# Patient Record
Sex: MALE | Race: BLACK OR AFRICAN AMERICAN | NOT HISPANIC OR LATINO | Employment: PART TIME | URBAN - METROPOLITAN AREA
[De-identification: names, ages, dates, MRNs, and addresses within clinical notes are randomized per-mention and may not be internally consistent; named-entity substitution may affect disease eponyms.]

---

## 2017-06-23 PROBLEM — Z00.00 ENCOUNTER FOR PREVENTIVE HEALTH EXAMINATION: Status: ACTIVE | Noted: 2017-06-23

## 2019-04-23 ENCOUNTER — OFFICE VISIT (OUTPATIENT)
Dept: FAMILY MEDICINE | Facility: CLINIC | Age: 59
End: 2019-04-23
Payer: COMMERCIAL

## 2019-04-23 VITALS
SYSTOLIC BLOOD PRESSURE: 120 MMHG | WEIGHT: 161 LBS | TEMPERATURE: 97 F | DIASTOLIC BLOOD PRESSURE: 70 MMHG | HEIGHT: 66 IN | BODY MASS INDEX: 25.88 KG/M2 | HEART RATE: 78 BPM | OXYGEN SATURATION: 100 % | RESPIRATION RATE: 16 BRPM

## 2019-04-23 DIAGNOSIS — K21.00 GASTROESOPHAGEAL REFLUX DISEASE WITH ESOPHAGITIS: Primary | ICD-10-CM

## 2019-04-23 DIAGNOSIS — K40.90 LEFT INGUINAL HERNIA: ICD-10-CM

## 2019-04-23 DIAGNOSIS — R10.32 LLQ ABDOMINAL PAIN: ICD-10-CM

## 2019-04-23 DIAGNOSIS — R10.12 LUQ ABDOMINAL PAIN: ICD-10-CM

## 2019-04-23 PROCEDURE — 99204 OFFICE O/P NEW MOD 45 MIN: CPT | Performed by: PHYSICIAN ASSISTANT

## 2019-04-23 RX ORDER — LISINOPRIL AND HYDROCHLOROTHIAZIDE 12.5; 2 MG/1; MG/1
1 TABLET ORAL DAILY
COMMUNITY
Start: 2017-10-27 | End: 2019-04-23

## 2019-04-23 RX ORDER — METOPROLOL TARTRATE 50 MG
50 TABLET ORAL 2 TIMES DAILY
COMMUNITY
Start: 2017-10-27 | End: 2019-04-23

## 2019-04-23 RX ORDER — AMLODIPINE BESYLATE 5 MG/1
5 TABLET ORAL DAILY
COMMUNITY
Start: 2017-10-27 | End: 2019-04-23

## 2019-04-23 ASSESSMENT — ENCOUNTER SYMPTOMS
CONSTITUTIONAL NEGATIVE: 1
MUSCULOSKELETAL NEGATIVE: 1
PSYCHIATRIC NEGATIVE: 1
ENDOCRINE NEGATIVE: 1
EYES NEGATIVE: 1
RESPIRATORY NEGATIVE: 1
CARDIOVASCULAR NEGATIVE: 1
NEUROLOGICAL NEGATIVE: 1

## 2019-04-23 ASSESSMENT — MIFFLIN-ST. JEOR: SCORE: 1483.04

## 2019-04-23 NOTE — PROGRESS NOTES
SUBJECTIVE:   Geronimo Coats is a 58 year old male who presents to clinic today for the following   health issues:      Abdominal Pain      Duration: 3-4 months    Description (location/character/radiation): LUQ ans LLQ       Associated flank pain: None    Intensity:  moderate    Accompanying signs and symptoms:        Fever/Chills: no        Gas/Bloating: no        Nausea/vomitting: no        Diarrhea: no        Dysuria or Hematuria: no     History (previous similar pain/trauma/previous testing):     Precipitating or alleviating factors:       Pain worse with eating/BM/urination: YES- the moment he eats the pain starts- sharp for about 10-20 minutes       Pain relieved by BM: no     Therapies tried and outcome: None    LMP:  not applicable    He has severe side pain - right side very severe after eating  He has a new bulge on the left lower abdomen - worse with coughing, better when lying flat  PAIN is not at the sight of the hernia, but over the LLQ  No previous appointments for this  Has not had insurance for about a year  At night, he gets a chest pain when lying flat, which improves with water  Raising his head helps  No treatments for the burning pain  Sometimes stool is dark black - no blood, no constipation  Urination is normal     History of right inguinal hernia repair in 1974      Additional history: as documented    Reviewed  and updated as needed this visit by clinical staff  Tobacco  Allergies  Med Hx  Surg Hx  Fam Hx  Soc Hx        Reviewed and updated as needed this visit by Provider         There is no problem list on file for this patient.    History reviewed. No pertinent surgical history.    Social History     Tobacco Use     Smoking status: Never Smoker     Smokeless tobacco: Never Used   Substance Use Topics     Alcohol use: Yes     Comment: OCCATIONALLY     History reviewed. No pertinent family history.      Current Outpatient Medications   Medication Sig Dispense Refill     omeprazole  "(PRILOSEC) 20 MG DR capsule Take 2 capsules (40 mg) by mouth daily 60 capsule 1     ranitidine (ZANTAC) 150 MG tablet Take 1 tablet (150 mg) by mouth 2 times daily 60 tablet 1     Allergies   Allergen Reactions     Chocolate      Nuts      Orange Fruit [Citrus] Cough       Review of Systems   Constitutional: Negative.    HENT: Negative.    Eyes: Negative.    Respiratory: Negative.    Cardiovascular: Negative.    Endocrine: Negative.    Genitourinary: Negative.    Musculoskeletal: Negative.    Skin: Negative.    Neurological: Negative.    Psychiatric/Behavioral: Negative.        OBJECTIVE:     /70 (Cuff Size: Adult Regular)   Pulse 78   Temp 97  F (36.1  C) (Tympanic)   Resp 16   Ht 1.676 m (5' 6\")   Wt 73 kg (161 lb)   SpO2 100%   BMI 25.99 kg/m    Body mass index is 25.99 kg/m .    Physical Exam   Constitutional: He is oriented to person, place, and time. He appears well-developed and well-nourished. No distress.   HENT:   Head: Normocephalic.   Right Ear: External ear normal.   Left Ear: External ear normal.   Nose: Nose normal.   Mouth/Throat: Oropharynx is clear and moist.   Eyes: Conjunctivae and EOM are normal.   Neck: Normal range of motion.   Cardiovascular: Normal rate, regular rhythm and normal heart sounds.   Pulmonary/Chest: Effort normal and breath sounds normal.   Abdominal: Soft. There is no hepatosplenomegaly. There is tenderness in the left lower quadrant. There is no rigidity, no rebound, no guarding, no CVA tenderness, no tenderness at McBurney's point and negative Simental's sign. A hernia is present. Hernia confirmed positive in the left inguinal area.   Neurological: He is alert and oriented to person, place, and time.   Skin: He is not diaphoretic.   Psychiatric: He has a normal mood and affect. Judgment normal.       No results found for this or any previous visit (from the past 24 hour(s)).    ASSESSMENT/PLAN:       ICD-10-CM    1. Gastroesophageal reflux disease with esophagitis " K21.0 omeprazole (PRILOSEC) 20 MG DR capsule     ranitidine (ZANTAC) 150 MG tablet   2. LLQ abdominal pain R10.32 Fecal colorectal cancer screen (FIT)     CBC with platelets and differential     Comprehensive metabolic panel (BMP + Alb, Alk Phos, ALT, AST, Total. Bili, TP)     H Pylori antigen, stool   3. LUQ abdominal pain R10.12 Lipase   4. Left inguinal hernia K40.90 GENERAL SURG ADULT REFERRAL      1. No evidence of acute abdomen today.  History is concerning for gastritis or PUD.  Labs ordered today to evaluate for anemia, infection, liver function, kidney function, electrolytes, pancreas enzymes.  Stool studies to look for H pylori and blood.  He will start with a trial of omeprazole and ranitidine for a month and then recheck.  If the labs come up with anything - we will adjust the plan accordingly.   2. General surgery referral for left inguinal hernia.     Return in about 1 month (around 5/21/2019) for abdominal pain recheck.    Patient Instructions   I would like you to start by taking the two prescribed medications every day for a month, and then come back for a recheck.     We will also send you to a general surgeon for the hernia.     We will get labs today, and send you home with a kit for stool testing.      If the pain becomes very severe and does not improve after 10-30 minutes - go to the ER.       Eliezer Garcia PA-C  [facility]

## 2019-04-23 NOTE — PATIENT INSTRUCTIONS
I would like you to start by taking the two prescribed medications every day for a month, and then come back for a recheck.     We will also send you to a general surgeon for the hernia.     We will get labs today, and send you home with a kit for stool testing.      If the pain becomes very severe and does not improve after 10-30 minutes - go to the ER.

## 2019-04-24 DIAGNOSIS — R10.12 LUQ ABDOMINAL PAIN: ICD-10-CM

## 2019-04-24 DIAGNOSIS — R10.32 LLQ ABDOMINAL PAIN: ICD-10-CM

## 2019-04-24 DIAGNOSIS — D69.6 THROMBOCYTOPENIA (H): Primary | ICD-10-CM

## 2019-04-24 LAB
BASOPHILS # BLD AUTO: 0 10E9/L (ref 0–0.2)
BASOPHILS NFR BLD AUTO: 0.3 %
DIFFERENTIAL METHOD BLD: ABNORMAL
EOSINOPHIL # BLD AUTO: 0.1 10E9/L (ref 0–0.7)
EOSINOPHIL NFR BLD AUTO: 3.6 %
ERYTHROCYTE [DISTWIDTH] IN BLOOD BY AUTOMATED COUNT: 13.6 % (ref 10–15)
HCT VFR BLD AUTO: 46.1 % (ref 40–53)
HGB BLD-MCNC: 16.2 G/DL (ref 13.3–17.7)
LIPASE SERPL-CCNC: 155 U/L (ref 73–393)
LYMPHOCYTES # BLD AUTO: 1.4 10E9/L (ref 0.8–5.3)
LYMPHOCYTES NFR BLD AUTO: 41.4 %
MCH RBC QN AUTO: 29.5 PG (ref 26.5–33)
MCHC RBC AUTO-ENTMCNC: 35.1 G/DL (ref 31.5–36.5)
MCV RBC AUTO: 84 FL (ref 78–100)
MONOCYTES # BLD AUTO: 0.3 10E9/L (ref 0–1.3)
MONOCYTES NFR BLD AUTO: 8.8 %
NEUTROPHILS # BLD AUTO: 1.5 10E9/L (ref 1.6–8.3)
NEUTROPHILS NFR BLD AUTO: 45.9 %
PLATELET # BLD AUTO: 98 10E9/L (ref 150–450)
RBC # BLD AUTO: 5.49 10E12/L (ref 4.4–5.9)
WBC # BLD AUTO: 3.3 10E9/L (ref 4–11)

## 2019-04-24 PROCEDURE — 82274 ASSAY TEST FOR BLOOD FECAL: CPT | Performed by: PHYSICIAN ASSISTANT

## 2019-04-24 PROCEDURE — 83690 ASSAY OF LIPASE: CPT | Performed by: PHYSICIAN ASSISTANT

## 2019-04-24 PROCEDURE — 36415 COLL VENOUS BLD VENIPUNCTURE: CPT | Performed by: PHYSICIAN ASSISTANT

## 2019-04-24 PROCEDURE — 85025 COMPLETE CBC W/AUTO DIFF WBC: CPT | Performed by: PHYSICIAN ASSISTANT

## 2019-04-24 PROCEDURE — 80053 COMPREHEN METABOLIC PANEL: CPT | Performed by: PHYSICIAN ASSISTANT

## 2019-04-24 NOTE — LETTER
April 26, 2019      Geronimo Coats  [address] [place] [id number]        Dear ,    We are writing to inform you of your test results.    Test results indicate you may require additional follow up.  We will have you come in for additional blood work to evaluate these lab abnormalities further - then I will be in contact with you on those results.     Resulted Orders   Lipase   Result Value Ref Range    Lipase 155 73 - 393 U/L   Comprehensive metabolic panel (BMP + Alb, Alk Phos, ALT, AST, Total. Bili, TP)   Result Value Ref Range    Sodium 142 133 - 144 mmol/L    Potassium 3.0 (L) 3.4 - 5.3 mmol/L    Chloride 106 94 - 109 mmol/L    Carbon Dioxide 30 20 - 32 mmol/L    Anion Gap 6 3 - 14 mmol/L    Glucose 104 (H) 70 - 99 mg/dL    Urea Nitrogen 13 7 - 30 mg/dL    Creatinine 1.22 0.66 - 1.25 mg/dL    GFR Estimate 64 >60 mL/min/[1.73_m2]      Comment:      Non  GFR Calc  Starting 12/18/2018, serum creatinine based estimated GFR (eGFR) will be   calculated using the Chronic Kidney Disease Epidemiology Collaboration   (CKD-EPI) equation.      GFR Estimate If Black 74 >60 mL/min/[1.73_m2]      Comment:       GFR Calc  Starting 12/18/2018, serum creatinine based estimated GFR (eGFR) will be   calculated using the Chronic Kidney Disease Epidemiology Collaboration   (CKD-EPI) equation.      Calcium 9.0 8.5 - 10.1 mg/dL    Bilirubin Total 0.8 0.2 - 1.3 mg/dL    Albumin 4.0 3.4 - 5.0 g/dL    Protein Total 7.7 6.8 - 8.8 g/dL    Alkaline Phosphatase 80 40 - 150 U/L    ALT 38 0 - 70 U/L    AST 32 0 - 45 U/L   CBC with platelets and differential   Result Value Ref Range    WBC 3.3 (L) 4.0 - 11.0 10e9/L    RBC Count 5.49 4.4 - 5.9 10e12/L    Hemoglobin 16.2 13.3 - 17.7 g/dL    Hematocrit 46.1 40.0 - 53.0 %    MCV 84 78 - 100 fl    MCH 29.5 26.5 - 33.0 pg    MCHC 35.1 31.5 - 36.5 g/dL    RDW 13.6 10.0 - 15.0 %    Platelet Count 98 (L) 150 - 450 10e9/L    % Neutrophils 45.9 %    %  Lymphocytes 41.4 %    % Monocytes 8.8 %    % Eosinophils 3.6 %    % Basophils 0.3 %    Absolute Neutrophil 1.5 (L) 1.6 - 8.3 10e9/L    Absolute Lymphocytes 1.4 0.8 - 5.3 10e9/L    Absolute Monocytes 0.3 0.0 - 1.3 10e9/L    Absolute Eosinophils 0.1 0.0 - 0.7 10e9/L    Absolute Basophils 0.0 0.0 - 0.2 10e9/L    Diff Method Automated Method        If you have any questions or concerns, please call the clinic at the number listed above.       Sincerely,        Eliezer Garcia PA-C

## 2019-04-25 LAB
ALBUMIN SERPL-MCNC: 4 G/DL (ref 3.4–5)
ALP SERPL-CCNC: 80 U/L (ref 40–150)
ALT SERPL W P-5'-P-CCNC: 38 U/L (ref 0–70)
ANION GAP SERPL CALCULATED.3IONS-SCNC: 6 MMOL/L (ref 3–14)
AST SERPL W P-5'-P-CCNC: 32 U/L (ref 0–45)
BILIRUB SERPL-MCNC: 0.8 MG/DL (ref 0.2–1.3)
BUN SERPL-MCNC: 13 MG/DL (ref 7–30)
CALCIUM SERPL-MCNC: 9 MG/DL (ref 8.5–10.1)
CHLORIDE SERPL-SCNC: 106 MMOL/L (ref 94–109)
CO2 SERPL-SCNC: 30 MMOL/L (ref 20–32)
CREAT SERPL-MCNC: 1.22 MG/DL (ref 0.66–1.25)
GFR SERPL CREATININE-BSD FRML MDRD: 64 ML/MIN/{1.73_M2}
GLUCOSE SERPL-MCNC: 104 MG/DL (ref 70–99)
POTASSIUM SERPL-SCNC: 3 MMOL/L (ref 3.4–5.3)
PROT SERPL-MCNC: 7.7 G/DL (ref 6.8–8.8)
SODIUM SERPL-SCNC: 142 MMOL/L (ref 133–144)

## 2019-04-27 DIAGNOSIS — D69.6 THROMBOCYTOPENIA (H): ICD-10-CM

## 2019-04-27 LAB
RETICS # AUTO: 110.4 10E9/L (ref 25–95)
RETICS/RBC NFR AUTO: 1.9 % (ref 0.5–2)

## 2019-04-27 PROCEDURE — 87389 HIV-1 AG W/HIV-1&-2 AB AG IA: CPT | Performed by: PHYSICIAN ASSISTANT

## 2019-04-27 PROCEDURE — 86803 HEPATITIS C AB TEST: CPT | Performed by: PHYSICIAN ASSISTANT

## 2019-04-27 PROCEDURE — 85045 AUTOMATED RETICULOCYTE COUNT: CPT | Performed by: PHYSICIAN ASSISTANT

## 2019-04-27 PROCEDURE — 85060 BLOOD SMEAR INTERPRETATION: CPT | Performed by: PHYSICIAN ASSISTANT

## 2019-04-27 PROCEDURE — 36415 COLL VENOUS BLD VENIPUNCTURE: CPT | Performed by: PHYSICIAN ASSISTANT

## 2019-04-27 PROCEDURE — 85025 COMPLETE CBC W/AUTO DIFF WBC: CPT | Performed by: PHYSICIAN ASSISTANT

## 2019-04-27 NOTE — LETTER
May 6, 2019      Geronimo Mohr  [address] [place] [id number]        Dear ,    We are writing to inform you of your test results.    The labs show low platelets (which help with blood clotting), elevated reticulocytes (which mean increased blood cell production).    I would like you to return for one additional set of labs to look into this further - please call to make a lab only appointment at [phone].     Resulted Orders   Hepatitis C antibody   Result Value Ref Range    Hepatitis C Antibody Nonreactive NR^Nonreactive      Comment:      Assay performance characteristics have not been established for newborns,   infants, and children     HIV Antigen Antibody Combo   Result Value Ref Range    HIV Antigen Antibody Combo Nonreactive NR^Nonreactive          Comment:      HIV-1 p24 Ag & HIV-1/HIV-2 Ab Not Detected   Reticulocyte Count   Result Value Ref Range    % Retic 1.9 0.5 - 2.0 %    Absolute Retic 110.4 (H) 25 - 95 10e9/L   CBC with platelets differential   Result Value Ref Range    WBC 3.5 (L) 4.0 - 11.0 10e9/L    RBC Count 5.69 4.4 - 5.9 10e12/L    Hemoglobin 16.8 13.3 - 17.7 g/dL    Hematocrit 47.2 40.0 - 53.0 %    MCV 83 78 - 100 fl    MCH 29.5 26.5 - 33.0 pg    MCHC 35.6 31.5 - 36.5 g/dL    RDW 13.7 10.0 - 15.0 %    Platelet Count 96 (L) 150 - 450 10e9/L    Diff Method Manual Method    Blood Morphology Pathologist Review   Result Value Ref Range    Copath Report       Patient Name: GERONIMO MOHR  MR#: [id number]  Specimen #: [id number]  Collected: 4/27/2019  Received: 4/29/2019  Reported: 4/30/2019 11:01  Ordering Phy(s): NUNU AKINS    For improved result formatting, select 'View Enhanced Report Format' under   Linked Documents section.    TEST(S):  Peripheral Smear Morphology    FINAL DIAGNOSIS:  Peripheral Smear Morphology:  - Mild leukopenia with absolute neutropenia.  - Thrombocytopenia.    COMMENT:  Neutropenia in an adult may be seen in association with drug  reactions,   certain infections, autoimmune  processes, hypersplenism, copper deficiency among other conditions.   Thrombocytopenia may be seen in  association with conditions of decreased production by the bone marrow   (e.g. marrow infiltrative process,  myelodysplasia, suppression by alcohol) and/or conditions of increased   platelets destruction (e.g. ITP, TTP,  DIC, SLE, drugs, infections including anaplasmosis, lymphomas), among   other conditions.  There is no evidenc e  of platelets clumping and/or satellitism (as seen in spurious   thrombocytopenia).  There is no evidence of red  cell fragments (as seen in DIC or TTP).  Clinical correlation is   recommended.    Electronically signed out by:    Kevon Foster M.D., PhD    CLINICAL HISTORY:  58 year old male.    PERIPHERAL BLOOD DATA:  PERIPHERAL BLOOD DATA (Date: 04/27/2019)  Patient Value (Reference Range >18 year old male)  3.54    WBC         (4.0-11.0 x 10*9/L)  5.69    RBC         (4.4-5.9 x 10*12/L)  16.8    HGB         (13.3-17.7 g/dL)  47.2    HCT         (40.0-53.0 %)  83.0    MCV         (78-100fL)  29.5    MCH         (26.5-33.0 pg)  35.6    MCHC       (31.5-36.5 g/dL)  13.7    RDW         (10.0-15.0 %)  96.0    PLT         (150-450 x 10*9/L)    PERIPHERAL BLOOD DIFFERENTIAL - Manual 200 cells.  Relative counts  40.5%  Neutrophils  52.0%  Lymphocytes  6.0%  Monocytes  1.5%  Eosinophils  0.0%  Basophils    Absolute counts  1.4   Neutrophils  (Ref normal 1.6 - 8.3 x 10*9/L)  1.8   Lymphocyt es  (Ref normal 0.8 - 5.3 x 10*9/L)  0.2   Monocytes  (Ref normal 0 -1.3 x 10*9/L)  0.05   Eosinophils  (Ref normal 0 - 0.7 x 10*9/L)  0.0   Basophils  (Ref normal 0 - 0.2 x 10*9/L)    PERIPHERAL BLOOD MORPHOLOGY  The red blood cells are normal in number, normocytic and normochromic.    There is no anisopoikilocytosis.  There is no increased polychromasia.  No rouleaux formation is noted.  No   intracellular organisms are  identified.    The white blood cells are  decreased in number with normal morphology.    There is absolute neutropenia.  No  intracellular organisms are identified.    The platelets are decreased in number with normal morphology and   occasional larger well granulated forms.    The technical component of this testing was completed at the [facility], with the professional component performed   at the [facility], [address] [place] [phone] ([phone])    CPT Codes:  A: 38313-BBVS    COLLECTION SITE:  Client:  [facility]  Location:  [facility] (S)           If you have any questions or concerns, please call the clinic at the number listed above.       Sincerely,        Eliezer Garcia PA-C

## 2019-04-29 LAB
HCV AB SERPL QL IA: NONREACTIVE
HIV 1+2 AB+HIV1 P24 AG SERPL QL IA: NONREACTIVE

## 2019-04-30 LAB — COPATH REPORT: NORMAL

## 2019-05-01 LAB
DIFFERENTIAL METHOD BLD: ABNORMAL
ERYTHROCYTE [DISTWIDTH] IN BLOOD BY AUTOMATED COUNT: 13.7 % (ref 10–15)
HCT VFR BLD AUTO: 47.2 % (ref 40–53)
HGB BLD-MCNC: 16.8 G/DL (ref 13.3–17.7)
MCH RBC QN AUTO: 29.5 PG (ref 26.5–33)
MCHC RBC AUTO-ENTMCNC: 35.6 G/DL (ref 31.5–36.5)
MCV RBC AUTO: 83 FL (ref 78–100)
PLATELET # BLD AUTO: 96 10E9/L (ref 150–450)
RBC # BLD AUTO: 5.69 10E12/L (ref 4.4–5.9)
WBC # BLD AUTO: 3.5 10E9/L (ref 4–11)

## 2019-05-02 ENCOUNTER — APPOINTMENT (OUTPATIENT)
Dept: LAB | Facility: CLINIC | Age: 59
End: 2019-05-02
Attending: PHYSICIAN ASSISTANT
Payer: COMMERCIAL

## 2019-05-02 DIAGNOSIS — R70.1 RETICULOCYTOSIS: ICD-10-CM

## 2019-05-02 DIAGNOSIS — D69.6 THROMBOCYTOPENIA (H): Primary | ICD-10-CM

## 2019-05-02 DIAGNOSIS — D69.6 THROMBOCYTOPENIA (H): ICD-10-CM

## 2019-05-02 LAB — HEMOCCULT STL QL IA: NEGATIVE

## 2019-05-02 NOTE — PROGRESS NOTES
I called Geronimo and left a message at 12:33 on 5/2/19    - His labs show low platelets and elevated reticulocytes.   - I would like to obtain a few more labs to investigate this further - and they include a blood draw and stool testing (to be done at home).     Would he please make another lab only appointment to have these done?    Eliezer Garcia PA-C

## 2019-05-06 ENCOUNTER — TELEPHONE (OUTPATIENT)
Dept: FAMILY MEDICINE | Facility: CLINIC | Age: 59
End: 2019-05-06

## 2019-05-06 ENCOUNTER — OFFICE VISIT (OUTPATIENT)
Dept: SURGERY | Facility: CLINIC | Age: 59
End: 2019-05-06
Payer: COMMERCIAL

## 2019-05-06 VITALS
HEART RATE: 87 BPM | SYSTOLIC BLOOD PRESSURE: 140 MMHG | RESPIRATION RATE: 14 BRPM | HEIGHT: 66 IN | WEIGHT: 159 LBS | TEMPERATURE: 98.6 F | OXYGEN SATURATION: 97 % | BODY MASS INDEX: 25.55 KG/M2 | DIASTOLIC BLOOD PRESSURE: 94 MMHG

## 2019-05-06 DIAGNOSIS — R10.12 LUQ ABDOMINAL PAIN: Primary | ICD-10-CM

## 2019-05-06 DIAGNOSIS — K40.90 LEFT INGUINAL HERNIA: ICD-10-CM

## 2019-05-06 PROCEDURE — 99243 OFF/OP CNSLTJ NEW/EST LOW 30: CPT | Performed by: SURGERY

## 2019-05-06 ASSESSMENT — MIFFLIN-ST. JEOR: SCORE: 1473.97

## 2019-05-06 NOTE — PROGRESS NOTES
HPI: I have been asked by Bruna Garcia PA-C to evaluate this patient for a left inguinal hernia.  The patient initially presented with left-sided abdominal pain and was found to have a hernia during this work-up.  He specifically notes that his hernia is not painful.  He has pain in the left mid to upper abdomen which he believes is been getting slowly worse.  He reports that he has normal bowel movements.  He believes he has had a previous colonoscopy, but he is not sure when.  The patient did have a right inguinal hernia repaired in the 70s.  He has noticed a bulge in the left groin for over a 1 year.    Past Medical History:  Gastroesophageal reflux    Past Surgical History:  Right inguinal hernia repair-1970s     Social History:  Social History     Socioeconomic History     Marital status: Legally      Spouse name: Not on file     Number of children: Not on file     Years of education: Not on file     Highest education level: Not on file   Occupational History     Not on file   Social Needs     Financial resource strain: Not on file     Food insecurity:     Worry: Not on file     Inability: Not on file     Transportation needs:     Medical: Not on file     Non-medical: Not on file   Tobacco Use     Smoking status: Never Smoker     Smokeless tobacco: Never Used   Substance and Sexual Activity     Alcohol use: Yes     Comment: OCCATIONALLY     Drug use: Never     Sexual activity: Not Currently   Lifestyle     Physical activity:     Days per week: Not on file     Minutes per session: Not on file     Stress: Not on file   Relationships     Social connections:     Talks on phone: Not on file     Gets together: Not on file     Attends Yazidism service: Not on file     Active member of club or organization: Not on file     Attends meetings of clubs or organizations: Not on file     Relationship status: Not on file     Intimate partner violence:     Fear of current or ex partner: Not on file      Emotionally abused: Not on file     Physically abused: Not on file     Forced sexual activity: Not on file   Other Topics Concern     Not on file   Social History Narrative     Not on file        Family History:  History reviewed. No pertinent family history.      ROS:  The 10 point review of systems is negative other than noted in the HPI and above.    PE:    General- Well-developed, well-nourished, patient able to get up on table without difficulty.  HEENT- Normocephalic and atraumatic. Pupils equal and round.  Mucous membranes moist.  Sclera are nonicteric.  Neck- No lymphadenopathy or masses   Respirations- are regular and non labored  Abdomen is abdomen is soft with fairly discrete tenderness in the mid to upper left abdomen.  There is no associated palpable mass.  The remainder of the abdomen is nontender.  Hernia- Left inguinal hernia is present with valsalva.  This is easily reducible.              There is no evidence of recurrent right inguinal hernia.   Umbilical hernia is not present.              External genitalia are normal               Assessment: Left-sided abdominal pain, left inguinal hernia    Plan: This is a patient with left sided abdominal pain of uncertain etiology.  It is almost certainly unrelated to his left inguinal hernia, however.  The patient is undergoing work-up for that and his primary care office.  It may be that consideration of colonoscopy or a CT scan would be appropriate in the future.  I do think the patient should eventually have his left inguinal hernia repaired.  We have discussed observation, reduction techniques and importance, incarceration and strangulation signs, symptoms and importance as well as need to seek emergency treatment.    We have discussed surgery in detail, including risk, benefits, complications, mesh, infection, nerve and cord damage and their sequelae including chronic pain and testicular loss, lifting and activity limits after surgery. He has been  given literature to review.  At this point, I would like to wait until the patient's left-sided abdominal pain has been worked up prior to proceeding with hernia repair.  We discussed open versus robotic approaches, and the patient would prefer a robotic assisted left inguinal hernia repair with mesh.  He will call when he is ready to schedule surgery.      Kapil Cohen MD    Please route or send letter to:  Primary Care Provider (PCP)

## 2019-05-06 NOTE — LETTER
May 6, 2019       Re: Geronimo Coats - 1960    HPI: I have been asked by Bruna Garcia PA-C to evaluate this patient for a left inguinal hernia.  The patient initially presented with left-sided abdominal pain and was found to have a hernia during this work-up.  He specifically notes that his hernia is not painful.  He has pain in the left mid to upper abdomen which he believes is been getting slowly worse.  He reports that he has normal bowel movements.  He believes he has had a previous colonoscopy, but he is not sure when.  The patient did have a right inguinal hernia repaired in the 70s.  He has noticed a bulge in the left groin for over a 1 year.     Past Medical History:  Gastroesophageal reflux     Past Surgical History:  Right inguinal hernia repair-          ROS:  The 10 point review of systems is negative other than noted in the HPI and above.     PE:    General- Well-developed, well-nourished, patient able to get up on table without difficulty.  HEENT- Normocephalic and atraumatic. Pupils equal and round.  Mucous membranes moist.  Sclera are nonicteric.  Neck- No lymphadenopathy or masses   Respirations- are regular and non labored  Abdomen is abdomen is soft with fairly discrete tenderness in the mid to upper left abdomen.  There is no associated palpable mass.  The remainder of the abdomen is nontender.  Hernia- Left inguinal hernia is present with valsalva.  This is easily reducible.              There is no evidence of recurrent right inguinal hernia.              Umbilical hernia is not present.              External genitalia are normal               Assessment: Left-sided abdominal pain, left inguinal hernia     Plan: This is a patient with left sided abdominal pain of uncertain etiology.  It is almost certainly unrelated to his left inguinal hernia, however.  The patient is undergoing work-up for that and his primary care office.  It may be that consideration of colonoscopy or a  CT scan would be appropriate in the future.  I do think the patient should eventually have his left inguinal hernia repaired.  We have discussed observation, reduction techniques and importance, incarceration and strangulation signs, symptoms and importance as well as need to seek emergency treatment.    We have discussed surgery in detail, including risk, benefits, complications, mesh, infection, nerve and cord damage and their sequelae including chronic pain and testicular loss, lifting and activity limits after surgery. He has been given literature to review.  At this point, I would like to wait until the patient's left-sided abdominal pain has been worked up prior to proceeding with hernia repair.  We discussed open versus robotic approaches, and the patient would prefer a robotic assisted left inguinal hernia repair with mesh.  He will call when he is ready to schedule surgery.        Kapil Cohen MD

## 2019-05-06 NOTE — TELEPHONE ENCOUNTER
Left message asking pt to call clinic provider back. See Staff message below.     Bruna Garcia PA-C  P [facility] Triage             FYI if patient returns the call this afternoon.  I would like him to come back for additional labs (again).  -Eliezer

## 2019-05-07 NOTE — TELEPHONE ENCOUNTER
Patient Contact    Attempt # 2    Was call answered?  No.  Left message on voicemail with information to call back and schedule a lab only appointment.    Is provider wanting these labs done prior to patient's physical at end of month?

## 2019-05-09 NOTE — TELEPHONE ENCOUNTER
Pt was given providers message -ok to wait until his physical in 2 weeks to discuss lab results with provider.

## 2019-05-21 ENCOUNTER — OFFICE VISIT (OUTPATIENT)
Dept: FAMILY MEDICINE | Facility: CLINIC | Age: 59
End: 2019-05-21
Payer: COMMERCIAL

## 2019-05-21 VITALS
HEART RATE: 73 BPM | BODY MASS INDEX: 25.88 KG/M2 | DIASTOLIC BLOOD PRESSURE: 86 MMHG | TEMPERATURE: 97.1 F | RESPIRATION RATE: 14 BRPM | SYSTOLIC BLOOD PRESSURE: 138 MMHG | HEIGHT: 66 IN | WEIGHT: 161 LBS

## 2019-05-21 DIAGNOSIS — Z00.00 ENCOUNTER FOR ROUTINE ADULT HEALTH EXAMINATION WITHOUT ABNORMAL FINDINGS: Primary | ICD-10-CM

## 2019-05-21 DIAGNOSIS — Z12.5 SCREENING FOR PROSTATE CANCER: ICD-10-CM

## 2019-05-21 DIAGNOSIS — Z13.220 SCREENING FOR HYPERLIPIDEMIA: ICD-10-CM

## 2019-05-21 DIAGNOSIS — R10.32 LLQ ABDOMINAL PAIN: ICD-10-CM

## 2019-05-21 DIAGNOSIS — K21.00 GASTROESOPHAGEAL REFLUX DISEASE WITH ESOPHAGITIS: ICD-10-CM

## 2019-05-21 DIAGNOSIS — R70.1 RETICULOCYTOSIS: ICD-10-CM

## 2019-05-21 DIAGNOSIS — D69.6 THROMBOCYTOPENIA (H): ICD-10-CM

## 2019-05-21 DIAGNOSIS — E87.6 HYPOKALEMIA: ICD-10-CM

## 2019-05-21 DIAGNOSIS — G47.10 EXCESSIVE SLEEPINESS: ICD-10-CM

## 2019-05-21 LAB — FOLATE SERPL-MCNC: 15.3 NG/ML

## 2019-05-21 PROCEDURE — 83921 ORGANIC ACID SINGLE QUANT: CPT | Mod: 90 | Performed by: PHYSICIAN ASSISTANT

## 2019-05-21 PROCEDURE — 99396 PREV VISIT EST AGE 40-64: CPT | Performed by: PHYSICIAN ASSISTANT

## 2019-05-21 PROCEDURE — 80061 LIPID PANEL: CPT | Performed by: PHYSICIAN ASSISTANT

## 2019-05-21 PROCEDURE — 82746 ASSAY OF FOLIC ACID SERUM: CPT | Performed by: PHYSICIAN ASSISTANT

## 2019-05-21 PROCEDURE — 99213 OFFICE O/P EST LOW 20 MIN: CPT | Mod: 25 | Performed by: PHYSICIAN ASSISTANT

## 2019-05-21 PROCEDURE — 36415 COLL VENOUS BLD VENIPUNCTURE: CPT | Performed by: PHYSICIAN ASSISTANT

## 2019-05-21 PROCEDURE — 83540 ASSAY OF IRON: CPT | Performed by: PHYSICIAN ASSISTANT

## 2019-05-21 PROCEDURE — 82525 ASSAY OF COPPER: CPT | Mod: 90 | Performed by: PHYSICIAN ASSISTANT

## 2019-05-21 PROCEDURE — 83550 IRON BINDING TEST: CPT | Performed by: PHYSICIAN ASSISTANT

## 2019-05-21 PROCEDURE — 99000 SPECIMEN HANDLING OFFICE-LAB: CPT | Performed by: PHYSICIAN ASSISTANT

## 2019-05-21 PROCEDURE — 84132 ASSAY OF SERUM POTASSIUM: CPT | Performed by: PHYSICIAN ASSISTANT

## 2019-05-21 PROCEDURE — G0103 PSA SCREENING: HCPCS | Performed by: PHYSICIAN ASSISTANT

## 2019-05-21 ASSESSMENT — ENCOUNTER SYMPTOMS
CONSTITUTIONAL NEGATIVE: 1
NEUROLOGICAL NEGATIVE: 1
MUSCULOSKELETAL NEGATIVE: 1
RESPIRATORY NEGATIVE: 1
ABDOMINAL PAIN: 1
CARDIOVASCULAR NEGATIVE: 1
EYES NEGATIVE: 1

## 2019-05-21 ASSESSMENT — MIFFLIN-ST. JEOR: SCORE: 1483.04

## 2019-05-21 ASSESSMENT — PATIENT HEALTH QUESTIONNAIRE - PHQ9
SUM OF ALL RESPONSES TO PHQ QUESTIONS 1-9: 4
10. IF YOU CHECKED OFF ANY PROBLEMS, HOW DIFFICULT HAVE THESE PROBLEMS MADE IT FOR YOU TO DO YOUR WORK, TAKE CARE OF THINGS AT HOME, OR GET ALONG WITH OTHER PEOPLE: NOT DIFFICULT AT ALL
SUM OF ALL RESPONSES TO PHQ QUESTIONS 1-9: 4

## 2019-05-21 NOTE — PROGRESS NOTES
SUBJECTIVE:   CC: Geronimo Coats is an 58 year old male who presents for preventative health visit.     Healthy Habits:     Getting at least 3 servings of Calcium per day:  Yes    Bi-annual eye exam:  Yes    Dental care twice a year:  NO    Sleep apnea or symptoms of sleep apnea:  Daytime drowsiness    Diet:  Other    Frequency of exercise:  None    Medication side effects:  None    PHQ-2 Total Score: 3    Additional concerns today:  Yes  Abdominal pain recheck- slight improvement    Sleep Apnea Screening Questions:    S - Do you snore? Yes  T - Daytime sleepiness? Yes  O - Has you partner observed you stop breathing during sleep? No  P - High blood pressure? No  B - BMI >35? No  A - Age >50? Yes  N - Neck size >17 in men? No  G - Male gender? Yes  Intermediate risk, but he does not have either B or N, which are most predictive of GARTH    Today's PHQ-2 Score:   PHQ-2 ( 1999 [employer]) 5/21/2019   Q1: Little interest or pleasure in doing things 3   Q2: Feeling down, depressed or hopeless 0   PHQ-2 Score 3   Q1: Little interest or pleasure in doing things Nearly every day   Q2: Feeling down, depressed or hopeless Not at all   PHQ-2 Score 3       Abuse: Current or Past(Physical, Sexual or Emotional)- No  Do you feel safe in your environment? Yes    Social History     Tobacco Use     Smoking status: Never Smoker     Smokeless tobacco: Never Used   Substance Use Topics     Alcohol use: Yes     Comment: OCCATIONALLY     If you drink alcohol do you typically have >3 drinks per day or >7 drinks per week? No    Alcohol Use 5/21/2019   Prescreen: >3 drinks/day or >7 drinks/week? No   Prescreen: >3 drinks/day or >7 drinks/week? -   No flowsheet data found.    Last PSA: No results found for: PSA    Reviewed orders with patient. Reviewed health maintenance and updated orders accordingly - Yes  BP Readings from Last 3 Encounters:   05/21/19 138/86   05/06/19 (!) 140/94   04/23/19 120/70    Wt Readings from Last 3 Encounters:  "  05/21/19 73 kg (161 lb)   05/06/19 72.1 kg (159 lb)   04/23/19 73 kg (161 lb)                  There is no problem list on file for this patient.    History reviewed. No pertinent surgical history.    Social History     Tobacco Use     Smoking status: Never Smoker     Smokeless tobacco: Never Used   Substance Use Topics     Alcohol use: Yes     Comment: OCCATIONALLY     History reviewed. No pertinent family history.      Current Outpatient Medications   Medication Sig Dispense Refill     omeprazole (PRILOSEC) 20 MG DR capsule Take 2 capsules (40 mg) by mouth daily 60 capsule 1     ranitidine (ZANTAC) 150 MG tablet Take 1 tablet (150 mg) by mouth 2 times daily 60 tablet 1     Allergies   Allergen Reactions     Chocolate      Nuts      Orange Fruit [Citrus] Cough       Reviewed and updated as needed this visit by clinical staff  Tobacco  Allergies  Meds  Med Hx  Surg Hx  Fam Hx  Soc Hx        Reviewed and updated as needed this visit by Provider            Review of Systems   Constitutional: Negative.    HENT: Negative.    Eyes: Negative.    Respiratory: Negative.    Cardiovascular: Negative.    Gastrointestinal: Positive for abdominal pain.   Genitourinary: Negative.    Musculoskeletal: Negative.    Skin: Negative.    Neurological: Negative.    Psychiatric/Behavioral:        As in HPI         OBJECTIVE:   /86 (Cuff Size: Adult Regular)   Pulse 73   Temp 97.1  F (36.2  C) (Tympanic)   Resp 14   Ht 1.676 m (5' 6\")   Wt 73 kg (161 lb)   BMI 25.99 kg/m      Physical Exam   Constitutional: He is oriented to person, place, and time. He appears well-developed and well-nourished. No distress.   HENT:   Right Ear: Tympanic membrane and external ear normal.   Left Ear: Tympanic membrane and external ear normal.   Nose: Nose normal.   Mouth/Throat: Oropharynx is clear and moist. No oral lesions. No oropharyngeal exudate.   Eyes: Pupils are equal, round, and reactive to light. Conjunctivae are normal. Right " eye exhibits no discharge. Left eye exhibits no discharge.   Neck: Neck supple. No tracheal deviation present. No thyromegaly present.   Cardiovascular: Normal rate, regular rhythm, S1 normal, S2 normal, normal heart sounds and normal pulses. Exam reveals no S3 and no S4.   No murmur heard.  Pulmonary/Chest: Effort normal and breath sounds normal. No respiratory distress. He has no wheezes. He has no rales.   Abdominal: Soft. Bowel sounds are normal. He exhibits no mass. There is no hepatosplenomegaly. There is no tenderness.   Musculoskeletal: Normal range of motion. He exhibits no edema or deformity.   Lymphadenopathy:     He has no cervical adenopathy.   Neurological: He is alert and oriented to person, place, and time. He has normal strength and normal reflexes. He exhibits normal muscle tone.   Skin: Skin is warm and dry. No lesion and no rash noted.   Psychiatric: He has a normal mood and affect. His speech is normal. Judgment and thought content normal. Cognition and memory are normal.         Diagnostic Test Results:  No results found for this or any previous visit (from the past 24 hour(s)).    ASSESSMENT/PLAN:       ICD-10-CM    1. Encounter for routine adult health examination without abnormal findings Z00.00 Lipid panel reflex to direct LDL Fasting     PSA, screen   2. Hypokalemia E87.6 Potassium   3. Thrombocytopenia (H) D69.6 Iron and iron binding capacity     Copper level     Folate     Methylmalonic acid     H Pylori antigen, stool   4. Screening for hyperlipidemia Z13.220    5. LLQ abdominal pain R10.32 GASTROENTEROLOGY ADULT REF CONSULT ONLY   6. Gastroesophageal reflux disease with esophagitis K21.0 GASTROENTEROLOGY ADULT REF CONSULT ONLY   7. Excessive sleepiness G47.10 SLEEP EVALUATION & MANAGEMENT REFERRAL - [facility] [phone]  (Age 18 and up)     SLEEP EVALUATION & MANAGEMENT REFERRAL - [facility] [phone]  (Age 18 and up)  "  8. Screening for prostate cancer Z12.5 PSA, screen   9. Reticulocytosis R70.1 Iron and iron binding capacity     Copper level     Folate     Methylmalonic acid      - Preventive orders as above  - Repeat potassium due to hypokalemia one month ago  - LLQ abdominal pain, GERD - referral to GI, possible need for EGD with his symptoms  - Sleep referral to [facility] for sleep apnea  - Labs completed today to evaluate thrombocytopenia and reticulocytosis    COUNSELING:   Reviewed preventive health counseling, as reflected in patient instructions       Regular exercise       Healthy diet/nutrition    Estimated body mass index is 25.99 kg/m  as calculated from the following:    Height as of this encounter: 1.676 m (5' 6\").    Weight as of this encounter: 73 kg (161 lb).      reports that he has never smoked. He has never used smokeless tobacco.      Counseling Resources:  ATP IV Guidelines  Pooled Cohorts Equation Calculator  FRAX Risk Assessment  ICSI Preventive Guidelines  Dietary Guidelines for Americans, 2010  Other Machine's MyPlate  ASA Prophylaxis  Lung CA Screening    Bruna Garcia PA-C  [facility]  Answers for HPI/ROS submitted by the patient on 5/21/2019   Annual Exam:  If you checked off any problems, how difficult have these problems made it for you to do your work, take care of things at home, or get along with other people?: Not difficult at all  PHQ9 TOTAL SCORE: 4    "

## 2019-05-21 NOTE — LETTER
May 23, 2019      Geronimo Coats  [address] [place] [id number]        Dear ,    We are writing to inform you of your test results.    PLEASE CALL TO SCHEDULE an appointment with hematology to evaluate some irregularities on your lab work:    [facility]: Cancer Care/Hematology - [place] [phone]      Also,     START taking potassium supplements.  I sent these to your pharmacy.    Return in one week to recheck the potassium.     Resulted Orders   Potassium   Result Value Ref Range    Potassium 3.1 (L) 3.4 - 5.3 mmol/L   Lipid panel reflex to direct LDL Fasting   Result Value Ref Range    Cholesterol 146 <200 mg/dL    Triglycerides 112 <150 mg/dL      Comment:      Non Fasting    HDL Cholesterol 48 >39 mg/dL    LDL Cholesterol Calculated 76 <100 mg/dL      Comment:      Desirable:       <100 mg/dl    Non HDL Cholesterol 98 <130 mg/dL   PSA, screen   Result Value Ref Range    PSA 1.42 0 - 4 ug/L      Comment:      Assay Method:  Chemiluminescence using Siemens Vista analyzer   Iron and iron binding capacity   Result Value Ref Range    Iron 126 35 - 180 ug/dL    Iron Binding Cap 311 240 - 430 ug/dL    Iron Saturation Index 41 15 - 46 %   Copper level   Result Value Ref Range    Copper 82.4 70.0 - 140.0 ug/dL      Comment:      (Note)  INTERPRETIVE INFORMATION: Copper, Serum or Plasma  Elevated results may be due to skin or collection-related   contamination, including the use of a noncertified   metal-free collection/transport tube. If contamination   concerns exist due to elevated levels of serum/plasma   copper, confirmation with a second specimen collected in a   certified metal-free tube is recommended.  Serum copper may be elevated with infection, inflammation,   stress, and copper supplementation. In females, elevated   copper may also be caused by oral contraceptives and   pregnancy (concentrations may be elevated up to 3 times   normal during the third trimester).  Test developed and  characteristics determined by [facility]. See Compliance Statement B: [facility]  Performed by [facility],  [address] [place] [place] [address] [phone]  [email or web address], Bay Vora MD, Lab. Director     Folate   Result Value Ref Range    Folate 15.3 >5.4 ng/mL   Methylmalonic acid   Result Value Ref Range    Methylmalonic Acid 0.20 0.00 - 0.40 umol/L      Comment:      (Note)  INTERPRETIVE INFORMATION: MMA Serum/Plasma,                            Vitamin B12 Status  Test developed and characteristics determined by [facility]. See Compliance Statement B: [facility]  Performed by [facility],  [address] [address] [phone]  [email or web address], Bay Vora MD, Lab. Director       ONC/HEME ADULT REFERRAL [[id number]]     Electronically signed by: Bruna Garcia PA-C on 05/23/19 0908 Status: Active   Ordering user: Bruna Garcia PA-C 05/23/19 0908   Order History   Outpatient   Date/Time Action Taken User Additional Information   05/23/19 0908 Sign Bruna Garcia PA-C    Comments     Your provider has referred you to: [facility]: Cancer Care/Hematology (All Cancer Related Services) - [place] [phone]   [email or web address]    Please be aware that coverage of these services is subject to the terms and limitations of your health insurance plan.  Call member services at your health plan with any benefit or coverage questions.      Please bring the following with you to your appointment:    (1) Any X-Rays, CTs or MRIs which have been performed.  Contact the facility where they were done to arrange for  prior to your scheduled appointment.   (2) List of current medications  (3) This referral request   (4) Any documents/labs given to you for this referral         If you have any questions or concerns, please call the clinic at the number listed above.       Sincerely,        Bruna  Kelli Garcia PA-C

## 2019-05-22 LAB
CHOLEST SERPL-MCNC: 146 MG/DL
HDLC SERPL-MCNC: 48 MG/DL
IRON SATN MFR SERPL: 41 % (ref 15–46)
IRON SERPL-MCNC: 126 UG/DL (ref 35–180)
LDLC SERPL CALC-MCNC: 76 MG/DL
NONHDLC SERPL-MCNC: 98 MG/DL
POTASSIUM SERPL-SCNC: 3.1 MMOL/L (ref 3.4–5.3)
PSA SERPL-ACNC: 1.42 UG/L (ref 0–4)
TIBC SERPL-MCNC: 311 UG/DL (ref 240–430)
TRIGL SERPL-MCNC: 112 MG/DL

## 2019-05-23 ENCOUNTER — TELEPHONE (OUTPATIENT)
Dept: ONCOLOGY | Facility: CLINIC | Age: 59
End: 2019-05-23

## 2019-05-23 ENCOUNTER — TELEPHONE (OUTPATIENT)
Dept: FAMILY MEDICINE | Facility: CLINIC | Age: 59
End: 2019-05-23

## 2019-05-23 LAB
COPPER SERPL-MCNC: 82.4 UG/DL (ref 70–140)
METHYLMALONATE SERPL-SCNC: 0.2 UMOL/L (ref 0–0.4)

## 2019-05-23 RX ORDER — POTASSIUM CHLORIDE 1500 MG/1
40 TABLET, EXTENDED RELEASE ORAL 2 TIMES DAILY
Qty: 60 TABLET | Refills: 1 | Status: SHIPPED | OUTPATIENT
Start: 2019-05-23 | End: 2019-11-14

## 2019-05-23 NOTE — TELEPHONE ENCOUNTER
ONCOLOGY INTAKE: Records Information      APPT INFORMATION:  Referring provider:  Bruna Garcia  Referring provider s clinic:  [facility]  Reason for visit/diagnosis:  Thrombocytopenia (H) [D69.6];Reticulocytosis [R70.1]  Has patient been notified of appointment date and time?: NA    RECORDS INFORMATION:  Were the records received with the referral (via Rightfax)? No    ADDITIONAL INFORMATION:  Pt asked to call us back for scheduling. Gave phone number. Pt referred to [facility] for scheduling.

## 2019-05-23 NOTE — TELEPHONE ENCOUNTER
I called and left a  with the following information:    - I would like him to see hematology for further workup of some lab abnormalities.  Referral is placed.  Number to call is   [facility]: Cancer Care/Hematology (All Cancer Related Services) - [place] [phone]      - I would like him to start taking a potassium supplement (sent to his pharmacy).  We will also recheck the potassium in one week.     Eliezer Garcia PA-C

## 2019-05-23 NOTE — TELEPHONE ENCOUNTER
Patient Contact    Attempt # 1    Was call answered?  No.  Left message on voicemail with information to call triage.    Upon callback relay Eliezer's Message:    - I would like him to see hematology for further workup of some lab abnormalities.  Referral is placed.  Number to call is   [facility]: Cancer Care/Hematology (All Cancer Related Services) - [place] [phone]       - I would like him to start taking a potassium supplement (sent to his pharmacy).  We will also recheck the potassium in one week.      Eliezer Garcia PA-C

## 2019-05-28 NOTE — TELEPHONE ENCOUNTER
ED / Discharge Outreach Protocol    Patient Contact    Attempt # 2    Was call answered?  No.  Left message on voicemail with information to call triage back.     I would like him to see hematology for further workup of some lab abnormalities.  Referral is placed.  Number to call is   [facility]: Cancer Care/Hematology (All Cancer Related Services) - [place] [phone]       - I would like him to start taking a potassium supplement (sent to his pharmacy).  We will also recheck the potassium in one week.

## 2019-05-29 DIAGNOSIS — R10.32 LLQ ABDOMINAL PAIN: Primary | ICD-10-CM

## 2019-05-29 PROCEDURE — 87338 HPYLORI STOOL AG IA: CPT | Performed by: PHYSICIAN ASSISTANT

## 2019-05-29 NOTE — TELEPHONE ENCOUNTER
Pt was called with providers message below. Pt declines to schedule lab only appointment now but agreed to call clinic back to schedule an appointment. States he will start taking potassium supplent today.

## 2019-05-30 LAB
H PYLORI AG STL QL IA: NORMAL
SPECIMEN SOURCE: NORMAL

## 2019-06-03 NOTE — TELEPHONE ENCOUNTER
ONCOLOGY INTAKE: Records Information      APPT INFORMATION: 06/19/2019 w/ Trent  Referring provider:  Bruna Garcia PA-C  Referring provider s clinic:  [facility]  Reason for visit/diagnosis:  Thrombocytopenia (H) [D69.6];Reticulocytosis [R70.1]: caller pt: Ref by  Has patient been notified of appointment date and time?: yes    RECORDS INFORMATION:  Were the records received with the referral (via Right[facility])? In [facility]    Has patient been seen for any external appt for this diagnosis? no    If yes, where? na    Has patient had any imaging or procedures outside of [place] for this condition? no      If Yes, where? na    ADDITIONAL INFORMATION:  Dx:Thrombocytopenia (H) [D69.6];Reticulocytosis [R70.1]: caller pt: Ref by:Bruna Garcia PA-C:-[facility]: Records In [facility]

## 2019-06-04 NOTE — TELEPHONE ENCOUNTER
RECORDS STATUS - ALL OTHER DIAGNOSIS      RECORDS RECEIVED FROM: Epic/   DATE RECEIVED: 6/19/19   NOTES STATUS DETAILS   OFFICE NOTE from referring provider Bruna Cardenas PA-C       OFFICE NOTE from medical oncologist N/A    DISCHARGE SUMMARY from hospital N/A    DISCHARGE REPORT from the ER N/A    OPERATIVE REPORT N/A    MEDICATION LIST Complete  Epic/   CLINICAL TRIAL TREATMENTS TO DATE N/A    LABS     PATHOLOGY REPORTS N/A    ANYTHING RELATED TO DIAGNOSIS     GENONOMIC TESTING     TYPE: N/A    IMAGING (NEED IMAGES & REPORT)     CT SCANS N/A    MRI     MAMMO     ULTRASOUND     PET

## 2019-06-11 ENCOUNTER — OFFICE VISIT (OUTPATIENT)
Dept: FAMILY MEDICINE | Facility: CLINIC | Age: 59
End: 2019-06-11
Payer: COMMERCIAL

## 2019-06-11 VITALS
WEIGHT: 165 LBS | DIASTOLIC BLOOD PRESSURE: 88 MMHG | RESPIRATION RATE: 16 BRPM | BODY MASS INDEX: 26.63 KG/M2 | SYSTOLIC BLOOD PRESSURE: 146 MMHG | HEART RATE: 79 BPM | TEMPERATURE: 98 F

## 2019-06-11 DIAGNOSIS — E87.6 HYPOKALEMIA: ICD-10-CM

## 2019-06-11 DIAGNOSIS — R10.11 RUQ ABDOMINAL PAIN: Primary | ICD-10-CM

## 2019-06-11 DIAGNOSIS — K21.00 GASTROESOPHAGEAL REFLUX DISEASE WITH ESOPHAGITIS: ICD-10-CM

## 2019-06-11 PROCEDURE — 99214 OFFICE O/P EST MOD 30 MIN: CPT | Performed by: PHYSICIAN ASSISTANT

## 2019-06-11 PROCEDURE — 36415 COLL VENOUS BLD VENIPUNCTURE: CPT | Performed by: PHYSICIAN ASSISTANT

## 2019-06-11 PROCEDURE — 84132 ASSAY OF SERUM POTASSIUM: CPT | Performed by: PHYSICIAN ASSISTANT

## 2019-06-11 NOTE — PROGRESS NOTES
Kari Coats is a 59 year old male who presents to clinic today for the following health issues:    HPI   F/U from Specialist Appt      Duration: Hematology appt on 5/23/19  Description (location/character/radiation): Thrombocytopenia (H) [D69.6];Reticulocytosis [R70.1]        Intensity:  mild    Accompanying signs and symptoms: na    History (similar episodes/previous evaluation): None    Precipitating or alleviating factors: None    Therapies tried and outcome: None     LUQ pain  The pain continues in the abdomen  Ranitidine and omeprazole are not helping much - H Pylori test was negative  He continues to have pain with eating  He has a history of abdominal surgery in 2013 - he is not sure what for exactly  Lab workup has revealed thrombocytopenia and reticulocytosis, hypokalemia  Normal hgb, creatinine, GFR, liver panel         There is no problem list on file for this patient.    History reviewed. No pertinent surgical history.    Social History     Tobacco Use     Smoking status: Never Smoker     Smokeless tobacco: Never Used   Substance Use Topics     Alcohol use: Yes     Comment: OCCATIONALLY     History reviewed. No pertinent family history.      Current Outpatient Medications   Medication Sig Dispense Refill     omeprazole (PRILOSEC) 20 MG DR capsule Take 2 capsules (40 mg) by mouth daily 60 capsule 1     potassium chloride ER (K-DUR/KLOR-CON M) 20 MEQ CR tablet Take 2 tablets (40 mEq) by mouth 2 times daily 60 tablet 1     ranitidine (ZANTAC) 150 MG tablet Take 1 tablet (150 mg) by mouth 2 times daily 60 tablet 1     Allergies   Allergen Reactions     Chocolate      Nuts      Orange Fruit [Citrus] Cough       Reviewed and updated as needed this visit by Provider         Review of Systems   Constitutional: Negative.    HENT: Negative.    Eyes: Negative.    Respiratory: Negative.    Cardiovascular: Negative.    Gastrointestinal:        As in HPI   Endocrine: Negative.    Genitourinary:  "Negative.    Musculoskeletal: Negative.    Skin: Negative.    Neurological: Negative.    Psychiatric/Behavioral: Negative.          Objective    /88 (Cuff Size: Adult Large)   Pulse 79   Temp 98  F (36.7  C) (Tympanic)   Resp 16   Wt 74.8 kg (165 lb)   BMI 26.63 kg/m    Physical Exam   Constitutional: He is oriented to person, place, and time. He appears well-developed and well-nourished. No distress.   HENT:   Head: Normocephalic.   Right Ear: External ear normal.   Left Ear: External ear normal.   Nose: Nose normal.   Eyes: Conjunctivae and EOM are normal.   Neck: Normal range of motion.   Pulmonary/Chest: Effort normal.   Abdominal: There is tenderness (mild) in the epigastric area and left upper quadrant. There is no rigidity, no rebound and no guarding.   Neurological: He is alert and oriented to person, place, and time.   Skin: He is not diaphoretic.   Psychiatric: He has a normal mood and affect. Judgment normal.       Diagnostic Test Results:  Results for orders placed or performed in visit on 06/11/19 (from the past 24 hour(s))   Potassium   Result Value Ref Range    Potassium 3.5 3.4 - 5.3 mmol/L           Assessment & Plan   Problem List Items Addressed This Visit     None      Visit Diagnoses     RUQ abdominal pain    -  Primary    Relevant Orders    CT Abdomen Pelvis w Contrast    Gastroesophageal reflux disease with esophagitis        Relevant Medications    omeprazole (PRILOSEC) 20 MG DR capsule    Hypokalemia        Relevant Orders    Potassium (Completed)         - Will get abdominal CT scan as next step to evaluate abdominal pain.  If that is normal, then will refer to GI.   - Potassium normal today    BMI:   Estimated body mass index is 26.63 kg/m  as calculated from the following:    Height as of 5/21/19: 1.676 m (5' 6\").    Weight as of this encounter: 74.8 kg (165 lb).           Patient Instructions   [facility] - Call to schedule your imaging test  [facility]" [address], [address]  [address] [place] [address]    Scheduling Line: [phone]  Scheduling Fax: [phone]  Clinic Phone: [phone]  Clinic Fax: [phone]  Office Hours:  Monday - Friday: 6:30am to 8:00pm          Return in about 1 day (around 6/12/2019) for Imaging.    Bruna Garcia PA-C  [facility]

## 2019-06-11 NOTE — LETTER
June 12, 2019      Geronimo Coats  [address] [place] [id number]        Dear ,    We are writing to inform you of your test results.    You potassium is now normal.     Resulted Orders   Potassium   Result Value Ref Range    Potassium 3.5 3.4 - 5.3 mmol/L       If you have any questions or concerns, please call the clinic at the number listed above.       Sincerely,        Bruna Garcia PA-C

## 2019-06-11 NOTE — PATIENT INSTRUCTIONS
[facility] [place] - Call to schedule your imaging test  [facility], [address]  [address] [place] [address]    Scheduling Line: [phone]  Scheduling Fax: [phone]  Clinic Phone: [phone]  Clinic Fax: [phone]  Office Hours:  Monday - Friday: 6:30am to 8:00pm

## 2019-06-12 LAB — POTASSIUM SERPL-SCNC: 3.5 MMOL/L (ref 3.4–5.3)

## 2019-06-12 ASSESSMENT — ENCOUNTER SYMPTOMS
CARDIOVASCULAR NEGATIVE: 1
MUSCULOSKELETAL NEGATIVE: 1
PSYCHIATRIC NEGATIVE: 1
EYES NEGATIVE: 1
ROS GI COMMENTS: AS IN HPI
CONSTITUTIONAL NEGATIVE: 1
NEUROLOGICAL NEGATIVE: 1
RESPIRATORY NEGATIVE: 1
ENDOCRINE NEGATIVE: 1

## 2019-06-18 ENCOUNTER — TELEPHONE (OUTPATIENT)
Dept: ONCOLOGY | Facility: CLINIC | Age: 59
End: 2019-06-18

## 2019-06-18 NOTE — TELEPHONE ENCOUNTER
Patient was called and notified of appointment with Dr. Newman tomorrow, June 19 at 10:00.  He states he knows where we are located, and will be here tomorrow.

## 2019-06-19 ENCOUNTER — HOSPITAL ENCOUNTER (OUTPATIENT)
Facility: CLINIC | Age: 59
Setting detail: SPECIMEN
Discharge: HOME | End: 2019-06-19
Attending: INTERNAL MEDICINE | Admitting: INTERNAL MEDICINE
Payer: COMMERCIAL

## 2019-06-19 ENCOUNTER — TELEPHONE (OUTPATIENT)
Dept: ONCOLOGY | Facility: CLINIC | Age: 59
End: 2019-06-19

## 2019-06-19 ENCOUNTER — ONCOLOGY VISIT (OUTPATIENT)
Dept: ONCOLOGY | Facility: CLINIC | Age: 59
End: 2019-06-19
Attending: PHYSICIAN ASSISTANT
Payer: COMMERCIAL

## 2019-06-19 ENCOUNTER — PRE VISIT (OUTPATIENT)
Dept: ONCOLOGY | Facility: CLINIC | Age: 59
End: 2019-06-19

## 2019-06-19 VITALS
DIASTOLIC BLOOD PRESSURE: 102 MMHG | WEIGHT: 160 LBS | TEMPERATURE: 98.2 F | SYSTOLIC BLOOD PRESSURE: 160 MMHG | HEART RATE: 78 BPM | BODY MASS INDEX: 25.71 KG/M2 | OXYGEN SATURATION: 97 % | HEIGHT: 66 IN

## 2019-06-19 DIAGNOSIS — D69.6 THROMBOCYTOPENIA (H): Primary | ICD-10-CM

## 2019-06-19 LAB — VIT B12 SERPL-MCNC: 449 PG/ML (ref 193–986)

## 2019-06-19 PROCEDURE — 00000402 ZZHCL STATISTIC TOTAL PROTEIN: Performed by: INTERNAL MEDICINE

## 2019-06-19 PROCEDURE — 82747 ASSAY OF FOLIC ACID RBC: CPT | Performed by: INTERNAL MEDICINE

## 2019-06-19 PROCEDURE — 87341 HEP B SURFACE AG NEUTRLZJ IA: CPT | Performed by: INTERNAL MEDICINE

## 2019-06-19 PROCEDURE — 82607 VITAMIN B-12: CPT | Performed by: INTERNAL MEDICINE

## 2019-06-19 PROCEDURE — 84165 PROTEIN E-PHORESIS SERUM: CPT | Performed by: INTERNAL MEDICINE

## 2019-06-19 PROCEDURE — 87340 HEPATITIS B SURFACE AG IA: CPT | Performed by: INTERNAL MEDICINE

## 2019-06-19 PROCEDURE — 99214 OFFICE O/P EST MOD 30 MIN: CPT | Performed by: INTERNAL MEDICINE

## 2019-06-19 PROCEDURE — 86704 HEP B CORE ANTIBODY TOTAL: CPT | Performed by: INTERNAL MEDICINE

## 2019-06-19 ASSESSMENT — MIFFLIN-ST. JEOR: SCORE: 1473.51

## 2019-06-19 ASSESSMENT — PAIN SCALES - GENERAL: PAINLEVEL: SEVERE PAIN (6)

## 2019-06-19 NOTE — LETTER
6/19/2019         RE: Geronimo Coats  [address] [facility] [address]        Dear Colleague,    Thank you for referring your patient, Geronimo Coats, to the [facility]. Please see a copy of my visit note below.    This clinic visit note has been dictated [id number]          [facility] PHYSICIANS  HEMATOLOGY ONCOLOGY    Visit Date:   06/19/2019      HEMATOLOGY CONSULTATION       REASON FOR CONSULTATION:  Thrombocytopenia.      REFERRING PROVIDER:  Bruna Garcia PA-C.      HISTORY OF PRESENT ILLNESS:  The patient is a 49-year-old gentleman.  He had his routine blood work done which indicated mild neutropenia and thrombocytopenia.  His initial white cell count was 3.3 with absolute neutrophil count of 1.5 and platelet count of 98,000.  His most recent white cell count is 3.5 with a platelet count of 96,000.  Review of outside records through [facility] indicated that he had a white cell count of 3.1 and platelet count of 94,000 in 04/2015.  He does not have any issues with recurrent infection or bleeding.  He does not take any supplements and does not drink alcohol regularly.      PAST MEDICAL HISTORY:  Acid reflux and hernia.      MEDICATIONS:  Reviewed.      ALLERGIES:  REVIEWED.      SOCIAL HISTORY:  Nonsmoker, occasional alcohol intake.  He works in a group home.      FAMILY HISTORY:  Reviewed.  There is no history of blood disorders or cancers.      REVIEW OF SYSTEMS:  A complete review of systems performed and found to be negative other than pertinent positives mentioned in History of Present Illness.       PHYSICAL EXAMINATION:   VITAL SIGNS:  Blood pressure is 181/113, pulse 78, temperature 98.2.   CONSTITUTIONAL: Sitting comfortably.   HEENT: Pupils are equal. Oropharynx is clear.   NECK: No cervical or supraclavicular lymphadenopathy.   RESPIRATORY: Clear bilaterally.   CARD/VASC: S1, S2, regular.   GI: Soft, nontender, nondistended, no hepatosplenomegaly.    MUSKULOSKELETAL: Warm, well perfused.   NEUROLOGIC: Alert, awake.   INTEGUMENT: No rash.   LYMPHATICS: No edema.   PSYCH: Mood and affect was normal.     LABORATORY DATA AND IMAGING REVIEWED DURING THIS VISIT:  Recent Labs   Lab Test 06/11/19  1120 05/21/19  1039 04/24/19  1232   NA  --   --  142   POTASSIUM 3.5 3.1* 3.0*   CHLORIDE  --   --  106   CO2  --   --  30   ANIONGAP  --   --  6   BUN  --   --  13   CR  --   --  1.22   GLC  --   --  104*   EUGENIO  --   --  9.0     Recent Labs   Lab Test 04/27/19  0954 04/24/19  1232   WBC 3.5* 3.3*   HGB 16.8 16.2   PLT 96* 98*   MCV 83 84   NEUTROPHIL  --  45.9     Recent Labs   Lab Test 04/24/19  1232   BILITOTAL 0.8   ALKPHOS 80   ALT 38   AST 32   ALBUMIN 4.0      ASSESSMENT AND RECOMMENDATIONS:  This is a 59-year-old gentleman who is seen for an initial visit today for thrombocytopenia and mild neutropenia.  He has these blood count abnormalities for the last few years.  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      The workup has been done by primary care physician which has indicated normal iron level, normal iron saturation, normal copper level, normal kidney and renal function.  Today, we will proceed with some additional testing.  He also told me that there was a concern of hepatitis B in his case a few years ago.  He was never tested for that.  I will proceed with serum protein electrophoresis, hepatitis B serology, folate, and B12 level today.  We will also have him get an abdominal ultrasound to assess for hepatosplenomegaly.  I plan to see him in 2-3 weeks to review these results.        I will also send a message to his primary care about his elevated blood pressure, which will need to be rechecked in the near future.         LONA CHRISTINE MD             D: 06/19/2019   T: 06/19/2019   MT:       Name:     GERA MOHR   MRN:      [id number]        Account:       [id number]   :      1960           Visit Date:   2019      Document: [id number]       cc: Bruna Garcia PA-C       Again, thank you for allowing me to participate in the care of your patient.        Sincerely,        Brennan Newman MD

## 2019-06-19 NOTE — PROGRESS NOTES
[facility] PHYSICIANS  HEMATOLOGY ONCOLOGY    Visit Date:   06/19/2019      HEMATOLOGY CONSULTATION       REASON FOR CONSULTATION:  Thrombocytopenia.      REFERRING PROVIDER:  Bruna Garcia PA-C.      HISTORY OF PRESENT ILLNESS:  The patient is a 49-year-old gentleman.  He had his routine blood work done which indicated mild neutropenia and thrombocytopenia.  His initial white cell count was 3.3 with absolute neutrophil count of 1.5 and platelet count of 98,000.  His most recent white cell count is 3.5 with a platelet count of 96,000.  Review of outside records through [facility] indicated that he had a white cell count of 3.1 and platelet count of 94,000 in 04/2015.  He does not have any issues with recurrent infection or bleeding.  He does not take any supplements and does not drink alcohol regularly.      PAST MEDICAL HISTORY:  Acid reflux and hernia.      MEDICATIONS:  Reviewed.      ALLERGIES:  REVIEWED.      SOCIAL HISTORY:  Nonsmoker, occasional alcohol intake.  He works in a group home.      FAMILY HISTORY:  Reviewed.  There is no history of blood disorders or cancers.      REVIEW OF SYSTEMS:  A complete review of systems performed and found to be negative other than pertinent positives mentioned in History of Present Illness.       PHYSICAL EXAMINATION:   VITAL SIGNS:  Blood pressure is 181/113, pulse 78, temperature 98.2.   CONSTITUTIONAL: Sitting comfortably.   HEENT: Pupils are equal. Oropharynx is clear.   NECK: No cervical or supraclavicular lymphadenopathy.   RESPIRATORY: Clear bilaterally.   CARD/VASC: S1, S2, regular.   GI: Soft, nontender, nondistended, no hepatosplenomegaly.   MUSKULOSKELETAL: Warm, well perfused.   NEUROLOGIC: Alert, awake.   INTEGUMENT: No rash.   LYMPHATICS: No edema.   PSYCH: Mood and affect was normal.     LABORATORY DATA AND IMAGING REVIEWED DURING THIS VISIT:  Recent Labs   Lab Test 06/11/19  1120 05/21/19  1039 04/24/19  1232   NA  --   --  142    POTASSIUM 3.5 3.1* 3.0*   CHLORIDE  --   --  106   CO2  --   --  30   ANIONGAP  --   --  6   BUN  --   --  13   CR  --   --  1.22   GLC  --   --  104*   EUGENIO  --   --  9.0     Recent Labs   Lab Test 19  0954 19  1232   WBC 3.5* 3.3*   HGB 16.8 16.2   PLT 96* 98*   MCV 83 84   NEUTROPHIL  --  45.9     Recent Labs   Lab Test 19  1232   BILITOTAL 0.8   ALKPHOS 80   ALT 38   AST 32   ALBUMIN 4.0      ASSESSMENT AND RECOMMENDATIONS:  This is a 59-year-old gentleman who is seen for an initial visit today for thrombocytopenia and mild neutropenia.  He has these blood count abnormalities for the last few years.  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      The workup has been done by primary care physician which has indicated normal iron level, normal iron saturation, normal copper level, normal kidney and renal function.  Today, we will proceed with some additional testing.  He also told me that there was a concern of hepatitis B in his case a few years ago.  He was never tested for that.  I will proceed with serum protein electrophoresis, hepatitis B serology, folate, and B12 level today.  We will also have him get an abdominal ultrasound to assess for hepatosplenomegaly.  I plan to see him in 2-3 weeks to review these results.        I will also send a message to his primary care about his elevated blood pressure, which will need to be rechecked in the near future.         LONA CHRISTINE MD             D: 2019   T: 2019   MT:       Name:     GERA MOHR   MRN:      [id number]        Account:      [id number]   :      1960           Visit Date:   2019      Document: [id number]       cc: Bruna Garcia PA-C

## 2019-06-20 LAB
ALBUMIN SERPL ELPH-MCNC: 4.2 G/DL (ref 3.7–5.1)
ALPHA1 GLOB SERPL ELPH-MCNC: 0.3 G/DL (ref 0.2–0.4)
ALPHA2 GLOB SERPL ELPH-MCNC: 0.7 G/DL (ref 0.5–0.9)
B-GLOBULIN SERPL ELPH-MCNC: 0.7 G/DL (ref 0.6–1)
FOLATE RBC-MCNC: 819 NG/ML
GAMMA GLOB SERPL ELPH-MCNC: 1.5 G/DL (ref 0.7–1.6)
HBV CORE AB SERPL QL IA: REACTIVE
HBV SURFACE AG SERPL QL IA: REACTIVE
HCT VFR BLD CALC: 46.9 %
M PROTEIN SERPL ELPH-MCNC: 0 G/DL
PROT PATTERN SERPL ELPH-IMP: NORMAL

## 2019-06-25 ENCOUNTER — HOSPITAL ENCOUNTER (OUTPATIENT)
Dept: ULTRASOUND IMAGING | Facility: CLINIC | Age: 59
Discharge: HOME | End: 2019-06-25
Attending: INTERNAL MEDICINE | Admitting: INTERNAL MEDICINE
Payer: COMMERCIAL

## 2019-06-25 DIAGNOSIS — D69.6 THROMBOCYTOPENIA (H): ICD-10-CM

## 2019-06-25 PROCEDURE — 76700 US EXAM ABDOM COMPLETE: CPT

## 2019-07-23 ENCOUNTER — TELEPHONE (OUTPATIENT)
Dept: ONCOLOGY | Facility: CLINIC | Age: 59
End: 2019-07-23

## 2019-07-23 NOTE — TELEPHONE ENCOUNTER
Spoke to pt and he was willing to reschedule his missed appointments, transferred to scheduling dept.   Nakul Cabello

## 2019-07-23 NOTE — TELEPHONE ENCOUNTER
Patient called to ask for results from Dr. Newman.    Patient knows he missed his appointments on 7/9/19 and 7/10/19, and would like to know if he needs to reschedule them.    I did offer to reschedule, but he would like a phone call from the nurse regarding his results first.

## 2019-11-13 ENCOUNTER — OFFICE VISIT (OUTPATIENT)
Dept: FAMILY MEDICINE | Facility: CLINIC | Age: 59
End: 2019-11-13
Payer: COMMERCIAL

## 2019-11-13 VITALS
HEART RATE: 90 BPM | WEIGHT: 161 LBS | SYSTOLIC BLOOD PRESSURE: 134 MMHG | BODY MASS INDEX: 25.99 KG/M2 | RESPIRATION RATE: 16 BRPM | DIASTOLIC BLOOD PRESSURE: 80 MMHG | OXYGEN SATURATION: 97 % | TEMPERATURE: 98.8 F

## 2019-11-13 DIAGNOSIS — I10 BENIGN ESSENTIAL HYPERTENSION: Primary | ICD-10-CM

## 2019-11-13 PROCEDURE — 36415 COLL VENOUS BLD VENIPUNCTURE: CPT | Performed by: PHYSICIAN ASSISTANT

## 2019-11-13 PROCEDURE — 80053 COMPREHEN METABOLIC PANEL: CPT | Performed by: PHYSICIAN ASSISTANT

## 2019-11-13 PROCEDURE — 99213 OFFICE O/P EST LOW 20 MIN: CPT | Performed by: PHYSICIAN ASSISTANT

## 2019-11-13 RX ORDER — METOPROLOL TARTRATE 50 MG
50 TABLET ORAL 2 TIMES DAILY
Qty: 60 TABLET | Refills: 1 | Status: SHIPPED | OUTPATIENT
Start: 2019-11-13 | End: 2020-06-15

## 2019-11-13 ASSESSMENT — ENCOUNTER SYMPTOMS
CONSTITUTIONAL NEGATIVE: 1
RESPIRATORY NEGATIVE: 1
EYES NEGATIVE: 1
NEUROLOGICAL NEGATIVE: 1
GASTROINTESTINAL NEGATIVE: 1
PSYCHIATRIC NEGATIVE: 1
ENDOCRINE NEGATIVE: 1
MUSCULOSKELETAL NEGATIVE: 1
CARDIOVASCULAR NEGATIVE: 1

## 2019-11-13 NOTE — LETTER
November 14, 2019      Geronimo Coats  [address] [facility] [address]        Dear ,    We are writing to inform you of your test results.    Your Potassium is LOW  Make sure you are taking the potassium supplement     potassium chloride ER (K-DUR/KLOR-CON M) 20 MEQ CR tablet        Sig - Route: Take 2 tablets (40 mEq) by mouth 2 times daily - Oral         Resulted Orders   Comprehensive metabolic panel (BMP + Alb, Alk Phos, ALT, AST, Total. Bili, TP)   Result Value Ref Range    Sodium 142 133 - 144 mmol/L    Potassium 2.9 (L) 3.4 - 5.3 mmol/L    Chloride 107 94 - 109 mmol/L    Carbon Dioxide 28 20 - 32 mmol/L    Anion Gap 7 3 - 14 mmol/L    Glucose 144 (H) 70 - 99 mg/dL    Urea Nitrogen 15 7 - 30 mg/dL    Creatinine 1.12 0.66 - 1.25 mg/dL    GFR Estimate 70 >60 mL/min/[1.73_m2]      Comment:      Non  GFR Calc  Starting 12/18/2018, serum creatinine based estimated GFR (eGFR) will be   calculated using the Chronic Kidney Disease Epidemiology Collaboration   (CKD-EPI) equation.      GFR Estimate If Black 81 >60 mL/min/[1.73_m2]      Comment:       GFR Calc  Starting 12/18/2018, serum creatinine based estimated GFR (eGFR) will be   calculated using the Chronic Kidney Disease Epidemiology Collaboration   (CKD-EPI) equation.      Calcium 8.9 8.5 - 10.1 mg/dL    Bilirubin Total 0.5 0.2 - 1.3 mg/dL    Albumin 3.8 3.4 - 5.0 g/dL    Protein Total 7.2 6.8 - 8.8 g/dL    Alkaline Phosphatase 96 40 - 150 U/L    ALT 50 0 - 70 U/L    AST 44 0 - 45 U/L       If you have any questions or concerns, please call the clinic at the number listed above.       Sincerely,        Bruna Garcia PA-C

## 2019-11-13 NOTE — PROGRESS NOTES
Subjective     Geronimo Coats is a 59 year old male who presents to clinic today for the following health issues:    HPI   Medication Followup of blood pressure medications    Taking Medication as prescribed: NO-pt has not been taking meds for quite some time, would like to restart    He has been taking his BP at home and noticed it has been high    He initially stopped the medications on his own last spring    Side Effects:  None    Medication Helping Symptoms:  yes     At one time he was taking amlodipine, lisinopril-hydrochlorothiazide, and metoprolol for hypertension.   He stopped taking them (on his own).   He now checks his BP at home and has had high readings recently.   He would like to restart medication for his BP        There is no problem list on file for this patient.    History reviewed. No pertinent surgical history.    Social History     Tobacco Use     Smoking status: Never Smoker     Smokeless tobacco: Never Used   Substance Use Topics     Alcohol use: Yes     Comment: OCCATIONALLY     History reviewed. No pertinent family history.      Current Outpatient Medications   Medication Sig Dispense Refill     metoprolol tartrate (LOPRESSOR) 50 MG tablet Take 1 tablet (50 mg) by mouth 2 times daily 60 tablet 1     omeprazole (PRILOSEC) 20 MG DR capsule Take 2 capsules (40 mg) by mouth daily 60 capsule 1     potassium chloride ER (K-DUR/KLOR-CON M) 20 MEQ CR tablet Take 2 tablets (40 mEq) by mouth 2 times daily 60 tablet 1     ranitidine (ZANTAC) 150 MG tablet Take 1 tablet (150 mg) by mouth 2 times daily 60 tablet 1     Allergies   Allergen Reactions     Chocolate      Nuts      Orange Fruit [Citrus] Cough         Reviewed and updated as needed this visit by Provider         Review of Systems   Constitutional: Negative.    HENT: Negative.    Eyes: Negative.    Respiratory: Negative.    Cardiovascular: Negative.    Gastrointestinal: Negative.    Endocrine: Negative.    Genitourinary: Negative.   "  Musculoskeletal: Negative.    Skin: Negative.    Neurological: Negative.    Psychiatric/Behavioral: Negative.          Objective    /80   Pulse 90   Temp 98.8  F (37.1  C) (Tympanic)   Resp 16   Wt 73 kg (161 lb)   SpO2 97%   BMI 25.99 kg/m    Physical Exam  Constitutional:       General: He is not in acute distress.     Appearance: He is well-developed. He is not diaphoretic.   HENT:      Head: Normocephalic.      Right Ear: External ear normal.      Left Ear: External ear normal.      Nose: Nose normal.   Eyes:      Conjunctiva/sclera: Conjunctivae normal.   Neck:      Musculoskeletal: Normal range of motion.   Cardiovascular:      Rate and Rhythm: Normal rate and regular rhythm.      Heart sounds: Normal heart sounds.   Pulmonary:      Effort: Pulmonary effort is normal.   Neurological:      Mental Status: He is alert and oriented to person, place, and time.   Psychiatric:         Judgment: Judgment normal.         Diagnostic Test Results:  No results found for this or any previous visit (from the past 24 hour(s)).        Assessment & Plan   Problem List Items Addressed This Visit     None      Visit Diagnoses     Benign essential hypertension    -  Primary    Relevant Medications    metoprolol tartrate (LOPRESSOR) 50 MG tablet    Other Relevant Orders    Comprehensive metabolic panel (BMP + Alb, Alk Phos, ALT, AST, Total. Bili, TP) (Completed)         - ok to restart metoprolol  - Today the BP is ok, however if he is consistently having high readings at home, it is appropriate to treat  - recheck in one month    BMI:   Estimated body mass index is 25.99 kg/m  as calculated from the following:    Height as of 6/19/19: 1.676 m (5' 6\").    Weight as of this encounter: 73 kg (161 lb).           There are no Patient Instructions on file for this visit.    Return in about 4 weeks (around 12/11/2019) for Blood Pressure Recheck.    Bruna Garcia PA-C  [facility] " [facility]

## 2019-11-14 ENCOUNTER — TELEPHONE (OUTPATIENT)
Dept: FAMILY MEDICINE | Facility: CLINIC | Age: 59
End: 2019-11-14

## 2019-11-14 DIAGNOSIS — E87.6 HYPOKALEMIA: ICD-10-CM

## 2019-11-14 LAB
ALBUMIN SERPL-MCNC: 3.8 G/DL (ref 3.4–5)
ALP SERPL-CCNC: 96 U/L (ref 40–150)
ALT SERPL W P-5'-P-CCNC: 50 U/L (ref 0–70)
ANION GAP SERPL CALCULATED.3IONS-SCNC: 7 MMOL/L (ref 3–14)
AST SERPL W P-5'-P-CCNC: 44 U/L (ref 0–45)
BILIRUB SERPL-MCNC: 0.5 MG/DL (ref 0.2–1.3)
BUN SERPL-MCNC: 15 MG/DL (ref 7–30)
CALCIUM SERPL-MCNC: 8.9 MG/DL (ref 8.5–10.1)
CHLORIDE SERPL-SCNC: 107 MMOL/L (ref 94–109)
CO2 SERPL-SCNC: 28 MMOL/L (ref 20–32)
CREAT SERPL-MCNC: 1.12 MG/DL (ref 0.66–1.25)
GFR SERPL CREATININE-BSD FRML MDRD: 70 ML/MIN/{1.73_M2}
GLUCOSE SERPL-MCNC: 144 MG/DL (ref 70–99)
POTASSIUM SERPL-SCNC: 2.9 MMOL/L (ref 3.4–5.3)
PROT SERPL-MCNC: 7.2 G/DL (ref 6.8–8.8)
SODIUM SERPL-SCNC: 142 MMOL/L (ref 133–144)

## 2019-11-14 RX ORDER — POTASSIUM CHLORIDE 1500 MG/1
40 TABLET, EXTENDED RELEASE ORAL 2 TIMES DAILY
Qty: 60 TABLET | Refills: 1 | Status: SHIPPED | OUTPATIENT
Start: 2019-11-14 | End: 2020-06-15

## 2019-11-14 NOTE — LETTER
"November 19, 2019      Geronimo Coats  [address] [facility] [address]        Dear Geronimo,     We have been trying to contact you but we were unsuccessful. Eliezer Garcia wanted you to know the following:    \"Potassium 2.9     I sent a refill of Klor-Con to Geronimo's pharmacy and called and left a message for him to start taking this.   He will also call back to let us know if he has been taking the supplement recently.  IF HE HAS been taking it, we will need to adjust the dose.\"          Sincerely,      Bruna Garcia, ZAKI          "

## 2019-11-14 NOTE — PROGRESS NOTES
Potassium 2.9    I sent a refill of Klor-Con to Geronimo's pharmacy and called and left a message for him to start taking this.   He will also call back to let us know if he has been taking the supplement recently.  IF HE HAS been taking it, we will need to adjust the dose.     Eliezer Garcia PA-C

## 2019-11-15 NOTE — TELEPHONE ENCOUNTER
"Patient Contact    Attempt # 2    Was call answered?  No.  Unable to leave VM. When phone answered there were \"dinging\" noises but no VM. No one answered phone.     If callback, relay message below.    "

## 2019-11-18 NOTE — TELEPHONE ENCOUNTER
Patient Contact    Attempt # 3    Was call answered?  No.  Left message on voicemail with information to call triage back.    Upon callback, relay provider message below.

## 2019-11-19 NOTE — TELEPHONE ENCOUNTER
Attempt #4.  Non-detailed message left for patient to return call.  Routing to team to please send letter.    INES SchultzN, RN  [employer] [facility]

## 2019-12-06 ENCOUNTER — DOCUMENTATION ONLY (OUTPATIENT)
Dept: OTHER | Facility: CLINIC | Age: 59
End: 2019-12-06

## 2020-06-15 ENCOUNTER — VIRTUAL VISIT (OUTPATIENT)
Dept: FAMILY MEDICINE | Facility: CLINIC | Age: 60
End: 2020-06-15
Payer: COMMERCIAL

## 2020-06-15 DIAGNOSIS — E87.6 HYPOKALEMIA: ICD-10-CM

## 2020-06-15 DIAGNOSIS — I10 BENIGN ESSENTIAL HYPERTENSION: ICD-10-CM

## 2020-06-15 DIAGNOSIS — D69.6 THROMBOCYTOPENIA (H): ICD-10-CM

## 2020-06-15 DIAGNOSIS — Z13.220 SCREENING FOR HYPERLIPIDEMIA: ICD-10-CM

## 2020-06-15 DIAGNOSIS — R07.89 ATYPICAL CHEST PAIN: Primary | ICD-10-CM

## 2020-06-15 DIAGNOSIS — Z12.5 SCREENING FOR PROSTATE CANCER: ICD-10-CM

## 2020-06-15 PROCEDURE — 99214 OFFICE O/P EST MOD 30 MIN: CPT | Mod: TEL | Performed by: PHYSICIAN ASSISTANT

## 2020-06-15 ASSESSMENT — ENCOUNTER SYMPTOMS
PSYCHIATRIC NEGATIVE: 1
GASTROINTESTINAL NEGATIVE: 1
MUSCULOSKELETAL NEGATIVE: 1
NEUROLOGICAL NEGATIVE: 1
CONSTITUTIONAL NEGATIVE: 1
EYES NEGATIVE: 1

## 2020-06-15 NOTE — PROGRESS NOTES
"Geronimo Coats is a 60 year old male who is being evaluated via a billable telephone visit.      The patient has been notified of following:     \"This telephone visit will be conducted via a call between you and your physician/provider. We have found that certain health care needs can be provided without the need for a physical exam.  This service lets us provide the care you need with a short phone conversation.  If a prescription is necessary we can send it directly to your pharmacy.  If lab work is needed we can place an order for that and you can then stop by our lab to have the test done at a later time.    Telephone visits are billed at different rates depending on your insurance coverage. During this emergency period, for some insurers they may be billed the same as an in-person visit.  Please reach out to your insurance provider with any questions.    If during the course of the call the physician/provider feels a telephone visit is not appropriate, you will not be charged for this service.\"    Patient has given verbal consent for Telephone visit?  Yes    What phone number would you like to be contacted at? [phone]    How would you like to obtain your AVS? Mail a copy    Subjective     Geronimo Coats is a 60 year old male who presents via phone visit today for the following health issues:    HPI  Exposure to Covid      Duration: 2 days ago    Description (location/character/radiation): Has intermittent chest pain    Intensity:  Pain has been there for a long time    Accompanying signs and symptoms: None    History (similar episodes/previous evaluation): None    Precipitating or alleviating factors: None    Therapies tried and outcome: None     He was exposed to COVID 10 days ago   Grandson in group home - fever - COVID confirmed  He was exposed to grandson for 2-3 days prior to this  On and off chest pain, sometimes hard to breath and feel like he has hiccups  About a month  Chest pain is not " exertional - can happen anytime  No pain when taking a big deep breath  No fever    Metoprolol - he stopped taking it because it made it difficult to get an erection  Erection is better now without the medication  He is not checking his blood pressure              There is no problem list on file for this patient.    History reviewed. No pertinent surgical history.    Social History     Tobacco Use     Smoking status: Never Smoker     Smokeless tobacco: Never Used   Substance Use Topics     Alcohol use: Yes     Comment: OCCATIONALLY     History reviewed. No pertinent family history.      No current outpatient medications on file.     Allergies   Allergen Reactions     Chocolate      Nuts      Orange Fruit [Citrus] Cough       Reviewed and updated as needed this visit by Provider         Review of Systems   Constitutional: Negative.    HENT: Negative.    Eyes: Negative.    Gastrointestinal: Negative.    Genitourinary: Negative.    Musculoskeletal: Negative.    Skin: Negative.    Neurological: Negative.    Psychiatric/Behavioral: Negative.              Objective   Reported vitals:  There were no vitals taken for this visit.   healthy, alert and no distress  PSYCH: Alert and oriented times 3; coherent speech, normal   rate and volume, able to articulate logical thoughts, able   to abstract reason, no tangential thoughts, no hallucinations   or delusions  His affect is normal  RESP: No cough, no audible wheezing, able to talk in full sentences  Remainder of exam unable to be completed due to telephone visits    Diagnostic Test Results:  Labs reviewed in Epic        Assessment/Plan:    ICD-10-CM    1. Atypical chest pain  R07.89 EKG 12-lead complete w/read - Clinics   2. Benign essential hypertension  I10 **Comprehensive metabolic panel FUTURE anytime   3. Hypokalemia  E87.6 **Comprehensive metabolic panel FUTURE anytime   4. Thrombocytopenia (H)  D69.6 **CBC with platelets FUTURE anytime   5. Screening for prostate  cancer  Z12.5 **Prostate spec antigen screen FUTURE anytime   6. Screening for hyperlipidemia  Z13.220 Lipid panel reflex to direct LDL Non-fasting      Chest pain does not sound emergently concerning (no dyspnea, not exertional, intermittent, ongoing since before exposure to COVID-19, no history of PE/DVT).     Patient should have an EKG and BP check - labs are also ordered.     Patient has stopped taking GERD mediations - possible cause for intermittent nonexertional chest pain.     Discussed lasting chest pain, SOB, radiation of pain, nausea, sweating - he should go to the ER.     Consider adding medications if labs/BP are concerning.     No follow-ups on file.      Phone call duration:  19 minutes    Eliezer Garcia PA-C

## 2020-06-15 NOTE — Clinical Note
Please contact patient to schedule a clinic visit on Friday 6/19 (he will be two weeks out from known COVID exposure by that time)  At that appt patient will need -   Labs  EKG  BP check    He does not need to see a provider at that time.     Eliezer

## 2020-11-24 ENCOUNTER — OFFICE VISIT (OUTPATIENT)
Dept: FAMILY MEDICINE | Facility: CLINIC | Age: 60
End: 2020-11-24
Payer: COMMERCIAL

## 2020-11-24 VITALS
OXYGEN SATURATION: 100 % | DIASTOLIC BLOOD PRESSURE: 100 MMHG | BODY MASS INDEX: 26.79 KG/M2 | WEIGHT: 166 LBS | SYSTOLIC BLOOD PRESSURE: 150 MMHG | HEART RATE: 91 BPM | RESPIRATION RATE: 16 BRPM | TEMPERATURE: 98.2 F

## 2020-11-24 DIAGNOSIS — B18.1 HEPATITIS B, CHRONIC (H): ICD-10-CM

## 2020-11-24 DIAGNOSIS — Z12.11 SCREEN FOR COLON CANCER: ICD-10-CM

## 2020-11-24 DIAGNOSIS — I10 BENIGN ESSENTIAL HYPERTENSION: Primary | ICD-10-CM

## 2020-11-24 DIAGNOSIS — M54.2 NECK PAIN: ICD-10-CM

## 2020-11-24 PROCEDURE — 86704 HEP B CORE ANTIBODY TOTAL: CPT | Performed by: PHYSICIAN ASSISTANT

## 2020-11-24 PROCEDURE — 87340 HEPATITIS B SURFACE AG IA: CPT | Performed by: PHYSICIAN ASSISTANT

## 2020-11-24 PROCEDURE — 86706 HEP B SURFACE ANTIBODY: CPT | Performed by: PHYSICIAN ASSISTANT

## 2020-11-24 PROCEDURE — 36415 COLL VENOUS BLD VENIPUNCTURE: CPT | Performed by: PHYSICIAN ASSISTANT

## 2020-11-24 PROCEDURE — 99214 OFFICE O/P EST MOD 30 MIN: CPT | Performed by: PHYSICIAN ASSISTANT

## 2020-11-24 PROCEDURE — 80076 HEPATIC FUNCTION PANEL: CPT | Performed by: PHYSICIAN ASSISTANT

## 2020-11-24 PROCEDURE — 86705 HEP B CORE ANTIBODY IGM: CPT | Performed by: PHYSICIAN ASSISTANT

## 2020-11-24 RX ORDER — METOPROLOL SUCCINATE 100 MG/1
100 TABLET, EXTENDED RELEASE ORAL AT BEDTIME
Qty: 90 TABLET | Refills: 0 | Status: SHIPPED | OUTPATIENT
Start: 2020-11-24 | End: 2021-06-07

## 2020-11-24 ASSESSMENT — ENCOUNTER SYMPTOMS
EYES NEGATIVE: 1
NEUROLOGICAL NEGATIVE: 1
GASTROINTESTINAL NEGATIVE: 1
PSYCHIATRIC NEGATIVE: 1
CARDIOVASCULAR NEGATIVE: 1
CONSTITUTIONAL NEGATIVE: 1
RESPIRATORY NEGATIVE: 1

## 2020-11-24 NOTE — PROGRESS NOTES
Subjective     Geronimo Coats is a 60 year old male who presents to clinic today for the following health issues:    HPI         Neck Pain  Onset/Duration: last week  Description:   Location: left side of neck, pt noticed that it is from his pillow  Radiation: none  Intensity: moderate  Progression of Symptoms:  same  Accompanying Signs & Symptoms:  Burning, tingling, prickly sensation in arm(s): no  Numbness in arm(s): no  Weakness in arm(s):  no  Fever: no  Headache: no  Nausea and/or vomiting: no  History:   Trauma: no  Previous neck pain: no  Previous surgery or injections: no  Previous Imaging (MRI,X ray): no  Precipitating or alleviating factors: None  Does movement impact the pain:  no  Therapies tried and outcome: nothing    Tuesday last week, he was at work and was leaning on a cushion for several hours  Severe pain in the left side of his neck  No pain today - got better after a few days  He treated by rubbing ointment on it    He has stopped the BP medication again          History reviewed. No pertinent past medical history.    History reviewed. No pertinent surgical history.    History reviewed. No pertinent family history.    Social History     Tobacco Use     Smoking status: Never Smoker     Smokeless tobacco: Never Used   Substance Use Topics     Alcohol use: Yes     Comment: OCCATIONALLY        Current Outpatient Medications   Medication     metoprolol succinate ER (TOPROL-XL) 100 MG 24 hr tablet     No current facility-administered medications for this visit.         Allergies   Allergen Reactions     Chocolate      Nuts      Orange Fruit [Citrus] Cough          Review of Systems   Constitutional: Negative.    HENT: Negative.    Eyes: Negative.    Respiratory: Negative.    Cardiovascular: Negative.    Gastrointestinal: Negative.    Genitourinary: Negative.    Musculoskeletal:        As in HPI   Skin: Negative.    Neurological: Negative.    Psychiatric/Behavioral: Negative.          Objective     BP (!) 150/100   Pulse 91   Temp 98.2  F (36.8  C)   Resp 16   Wt 75.3 kg (166 lb)   SpO2 100%   BMI 26.79 kg/m    Physical Exam  Constitutional:       General: He is not in acute distress.     Appearance: He is well-developed. He is not diaphoretic.   HENT:      Head: Normocephalic.      Right Ear: External ear normal.      Left Ear: External ear normal.      Nose: Nose normal.   Eyes:      Conjunctiva/sclera: Conjunctivae normal.   Neck:      Musculoskeletal: Normal range of motion.   Cardiovascular:      Rate and Rhythm: Normal rate and regular rhythm.   Pulmonary:      Effort: Pulmonary effort is normal.   Skin:     General: Skin is warm and dry.   Neurological:      Mental Status: He is alert and oriented to person, place, and time.   Psychiatric:         Judgment: Judgment normal.         Diagnostic Test Results:  No results found for this or any previous visit (from the past 24 hour(s)).        Assessment & Plan   Problem List Items Addressed This Visit        Digestive    Hepatitis B, chronic (H)    Relevant Orders    GASTROENTEROLOGY ADULT REF CONSULT ONLY    Hepatitis B surface antigen (Completed)    Hepatitis B core antibody (Completed)    Hepatitis B Surface Antibody (Completed)    Hepatic panel (Albumin, ALT, AST, Bili, Alk Phos, TP) (Completed)      Other Visit Diagnoses     Benign essential hypertension    -  Primary    Relevant Medications    metoprolol succinate ER (TOPROL-XL) 100 MG 24 hr tablet    Neck pain        Screen for colon cancer        Relevant Orders    Fecal colorectal cancer screen FIT           MDM  Number of Diagnoses or Management Options  Benign essential hypertension: established, worsening  Hepatitis B, chronic (H): new, needed workup  Neck pain: established, improving  Screen for colon cancer: minor     Amount and/or Complexity of Data Reviewed  Clinical lab tests: ordered  Review and summarize past medical records: yes     Hep B - labs draw over a year ago, looks like  they were never addressed.  Patient unsure if he has had treatment for hepatitis B.  Labs repeated today, added hepatic panel.  Referral to GI for hepatitis eval/treatment.   HTN - restart metoprolol and recheck BP in one month.  Unclear why he stopped taking metoprolol.   Neck pain - resolved today, no treatment needed.   FIT ordered for colon CA screen          Patient Instructions   The [facility] location is closing and I will be moving my practice to the [facility] on December 7, 2020.    The [facility] is located 8 miles west of the [facility].    New clinic address for Eliezer Garcia PA-C:  [address] [place] [address]  Phone: [phone] or [phone]    If you are not able to come to the [facility] moving forward, please note there are several other [facility] Clinics near the [facility].   - [facility] at [address] [place] [address]  - [facility] at [address] [place] [address]    I hope to continue to serve as your primary care provider in the future.  You can reach me through [employer] or by calling [phone].         Return in about 4 weeks (around 12/22/2020) for Medication Recheck, Blood Pressure Recheck.    Bruna Garcia PA-C  [facility]

## 2020-11-24 NOTE — LETTER
November 25, 2020      Geronimo Coats  [address] [address]        Dear ,    We are writing to inform you of your test results.    Lab results show you likely had Hepatitis B in the past, but or now recovered.  Or you have a chronic hepatitis B infection.  Either way, you need to see the gastroenterologist to discuss the results and make a plan.   Your liver function is normal.     Resulted Orders   Hepatitis B surface antigen   Result Value Ref Range    Hep B Surface Agn Nonreactive NR^Nonreactive   Hepatitis B core antibody   Result Value Ref Range    Hepatitis B Core Tabby Reactive (AA) NR^Nonreactive      Comment:      A reactive result indicates acute, chronic or past/resolved hepatitis B   infection.     Hepatitis B Surface Antibody   Result Value Ref Range    Hepatitis B Surface Antibody 0.79 <8.00 m[IU]/mL      Comment:      Nonreactive, No antibody detected when the value is less than 8.00 m[IU]/mL.   Hepatic panel (Albumin, ALT, AST, Bili, Alk Phos, TP)   Result Value Ref Range    Bilirubin Direct 0.2 0.0 - 0.2 mg/dL    Bilirubin Total 0.8 0.2 - 1.3 mg/dL    Albumin 3.7 3.4 - 5.0 g/dL    Protein Total 7.6 6.8 - 8.8 g/dL    Alkaline Phosphatase 90 40 - 150 U/L    ALT 58 0 - 70 U/L    AST 43 0 - 45 U/L       If you have any questions or concerns, please call the clinic at the number listed above.       Sincerely,        Bruna Garcia PA-C

## 2020-11-24 NOTE — PATIENT INSTRUCTIONS
The [facility] location is closing and I will be moving my practice to the [facility] on December 7, 2020.    The [facility] is located 8 miles west of the [facility].    New clinic address for Eliezer Garcia PA-C:  [address] [place] [address]  Phone: [phone] or [phone]    If you are not able to come to the [facility] moving forward, please note there are several other [facility] Clinics near the [facility].   - [facility] at [address] [place] [id number]  - [facility] at [address] [place] [address]    I hope to continue to serve as your primary care provider in the future.  You can reach me through [employer] or by calling [phone].

## 2020-11-25 LAB
ALBUMIN SERPL-MCNC: 3.7 G/DL (ref 3.4–5)
ALP SERPL-CCNC: 90 U/L (ref 40–150)
ALT SERPL W P-5'-P-CCNC: 58 U/L (ref 0–70)
AST SERPL W P-5'-P-CCNC: 43 U/L (ref 0–45)
BILIRUB DIRECT SERPL-MCNC: 0.2 MG/DL (ref 0–0.2)
BILIRUB SERPL-MCNC: 0.8 MG/DL (ref 0.2–1.3)
HBV CORE AB SERPL QL IA: REACTIVE
HBV CORE IGM SERPL QL IA: NONREACTIVE
HBV SURFACE AB SERPL IA-ACNC: 0.79 M[IU]/ML
HBV SURFACE AG SERPL QL IA: NONREACTIVE
PROT SERPL-MCNC: 7.6 G/DL (ref 6.8–8.8)

## 2020-11-30 ENCOUNTER — TELEPHONE (OUTPATIENT)
Dept: FAMILY MEDICINE | Facility: CLINIC | Age: 60
End: 2020-11-30

## 2020-11-30 DIAGNOSIS — B18.1 HEPATITIS B, CHRONIC (H): Primary | ICD-10-CM

## 2020-11-30 NOTE — TELEPHONE ENCOUNTER
Bruna Garcia PA-C   11/30/2020  4:06 PM CST      Hx Hepatitis B.  Results are not conclusive, I would like him to see GI.  Referral placed.   Please inform patient.      Eliezer Garcia PA-C       Patient Contact    Attempt # 1    Was call answered?  No.  Left message on voicemail with information to call me back.    [phone]

## 2020-12-01 NOTE — TELEPHONE ENCOUNTER
RN called back to pt.    Pt states he is busy and will need phone number later.  Please call pt back 12/02 around 11am

## 2020-12-02 NOTE — TELEPHONE ENCOUNTER
RECORDS RECEIVED FROM: Internal   Appt Date: 12.07.2020   NOTES STATUS DETAILS   OFFICE NOTE from referring provider Internal 11.24.2020 Consult Bruna Garcia PA-C   OFFICE NOTES from other specialists N/A    DISCHARGE SUMMARY from hospital N/A    MEDICATION LIST Internal  / CE    LIVER BIOSPY (IF APPLICABLE)      PATHOLOGY REPORTS  N/A    IMAGING     ENDOSCOPY (IF AVAILABLE) N/A    COLONOSCOPY (IF AVAILABLE) N/A    ULTRASOUND LIVER Internal 06.25.2019 ABDOMINAL ULTRASOUND     CT OF ABDOMEN N/A    MRI OF LIVER N/A    FIBROSCAN, US ELASTOGRAPHY, FIBROSIS SCAN, MR ELASTOGRAPHY N/A    LABS     HEPATIC PANEL (LIVER PANEL) Internal 11.24.2020   BASIC METABOLIC PANEL N/A    COMPLETE METABOLIC PANEL Internal 04.27.2019   COMPLETE BLOOD COUNT (CBC) Internal 04.27.2019   INTERNATIONAL NORMALIZED RATIO (INR) N/A    HEPATITIS C ANTIBODY Internal 04.27.2019   HEPATITIS C VIRAL LOAD/PCR N/A    HEPATITIS C GENOTYPE N/A    HEPATITIS B SURFACE ANTIGEN Internal 11.24.2020 06.19.2019   HEPATITIS B SURFACE ANTIBODY Internal 11.24.2020   HEPATITIS B DNA QUANT LEVEL N/A    HEPATITIS B CORE ANTIBODY Internal 11.24.2020 06.19.2019

## 2020-12-04 DIAGNOSIS — Z12.11 SCREEN FOR COLON CANCER: ICD-10-CM

## 2020-12-04 PROCEDURE — 82274 ASSAY TEST FOR BLOOD FECAL: CPT | Performed by: PHYSICIAN ASSISTANT

## 2020-12-07 ENCOUNTER — PRE VISIT (OUTPATIENT)
Dept: GASTROENTEROLOGY | Facility: CLINIC | Age: 60
End: 2020-12-07

## 2020-12-07 ENCOUNTER — VIRTUAL VISIT (OUTPATIENT)
Dept: GASTROENTEROLOGY | Facility: CLINIC | Age: 60
End: 2020-12-07
Attending: PHYSICIAN ASSISTANT
Payer: COMMERCIAL

## 2020-12-07 DIAGNOSIS — B18.1 HEPATITIS B, CHRONIC (H): ICD-10-CM

## 2020-12-07 DIAGNOSIS — Z12.9 SCREENING FOR CANCER: Primary | ICD-10-CM

## 2020-12-07 PROCEDURE — 99243 OFF/OP CNSLTJ NEW/EST LOW 30: CPT | Mod: 95 | Performed by: INTERNAL MEDICINE

## 2020-12-07 RX ORDER — ASPIRIN 81 MG/1
81 TABLET ORAL EVERY 6 HOURS PRN
Status: ON HOLD | COMMUNITY
End: 2022-10-05

## 2020-12-07 SDOH — HEALTH STABILITY: MENTAL HEALTH: HOW OFTEN DO YOU HAVE A DRINK CONTAINING ALCOHOL?: NOT ASKED

## 2020-12-07 SDOH — HEALTH STABILITY: MENTAL HEALTH: HOW OFTEN DO YOU HAVE 6 OR MORE DRINKS ON ONE OCCASION?: NOT ASKED

## 2020-12-07 SDOH — HEALTH STABILITY: MENTAL HEALTH: HOW MANY STANDARD DRINKS CONTAINING ALCOHOL DO YOU HAVE ON A TYPICAL DAY?: NOT ASKED

## 2020-12-07 ASSESSMENT — PAIN SCALES - GENERAL: PAINLEVEL: NO PAIN (0)

## 2020-12-07 NOTE — LETTER
"    12/7/2020         RE: Geronimo Coats  [address] [place] [id number]        Dear Colleague,    Thank you for referring your patient, Geronimo Coats, to the [facility] [place]. Please see a copy of my visit note below.    Geronimo Coats is a 60 year old male who is being evaluated via a billable telephone visit.      The patient has been notified of following:     \"This telephone visit will be conducted via a call between you and your physician/provider. We have found that certain health care needs can be provided without the need for a physical exam.  This service lets us provide the care you need with a short phone conversation.  If a prescription is necessary we can send it directly to your pharmacy.  If lab work is needed we can place an order for that and you can then stop by our lab to have the test done at a later time.    Telephone visits are billed at different rates depending on your insurance coverage. During this emergency period, for some insurers they may be billed the same as an in-person visit.  Please reach out to your insurance provider with any questions.    If during the course of the call the physician/provider feels a telephone visit is not appropriate, you will not be charged for this service.\"    Patient has given verbal consent for Telephone visit?  Yes    What phone number would you like to be contacted at? [phone]    How would you like to obtain your AVS? Mail a copy    Date of Service: 12/7/2020     Subjective:            Geronimo Coats is a 60 year old male presenting for evaluation of liver disease    History of Present Illness   Geronimo Coats is a 60 year old male with past medical history of hypertension who presents in consultation with concern of labs suggestive of hepatitis B and imaging concerning for hepatic fibrosis.    The history is difficult to obtain as there is a lot of noise on the patient's side of the " call, but he reports he was made aware he had some type of hepatitis a few years ago and he followed with a doctor at [facility].  He denies ever being on treatment for his hepatitis B.    Noted that he had hepatitis labs checked in June 2019 which demonstrated positive surface antigen and positive core antibody.  Labs were rechecked in November 2020 which now demonstrated surface antigen was negative, surface antibody was not protective at 0.79, core antibody was positive.  Abdominal ultrasound was performed in June 2019 demonstrated heterogeneous echotexture and lobular contour of the liver without intrahepatic lesion identified.  The spleen measured 13 cm on this exam.    Past Medical History:  Hypertension    Surgical History:  Cholecystectomy    Social History:  Social History     Tobacco Use     Smoking status: Never Smoker     Smokeless tobacco: Never Used   Substance Use Topics     Alcohol use: Yes     Comment: OCCATIONALLY 2-3 drinks per year     Drug use: Never       Family History:  No known family history of liver disease    Has children, still in [facility]  Immigrated from [place] [facility] in 2012    Medications:  Current Outpatient Medications   Medication     aspirin 81 MG EC tablet     metoprolol succinate ER (TOPROL-XL) 100 MG 24 hr tablet     No current facility-administered medications for this visit.        Review of Systems  A complete 10 point review of systems was asked and answered in the negative unless specifically commented upon in the HPI    Objective:           There were no vitals filed for this visit.  There is no height or weight on file to calculate BMI.     Physical Exam    Labs and Diagnostic tests:  Lab Results   Component Value Date     11/13/2019    POTASSIUM 2.9 11/13/2019    CHLORIDE 107 11/13/2019    CO2 28 11/13/2019    BUN 15 11/13/2019    CR 1.12 11/13/2019     Lab Results   Component Value Date    BILITOTAL 0.8 11/24/2020    ALT 58 11/24/2020    AST 43 11/24/2020    ALKPHOS  90 11/24/2020     Lab Results   Component Value Date    ALBUMIN 3.7 11/24/2020    PROTTOTAL 7.6 11/24/2020      Lab Results   Component Value Date    WBC 3.5 04/27/2019    HGB 16.8 04/27/2019    MCV 83 04/27/2019    PLT 96 04/27/2019     Imaging:  Abd US 6/2019  FINDINGS:    Gallbladder: Gallbladder is absent consistent with previous  cholecystectomy.     Bile ducts:   CHD is normal diameter.  No intrahepatic biliary  dilatation.     Liver: The liver has a heterogeneous echotexture and lobular contour  suggesting cirrhosis. No definite intrahepatic lesions are identified.     Pancreas:  Visualized portions of the pancreas are unremarkable.     Spleen:  The spleen measures 13 cm in length which is at the upper  limits of normal.     Right kidney:  Unremarkable     Left kidney:  There are cysts at the upper pole of the left kidney.  The largest measures 2.8 x 3.0 x 3.1 cm.     Aorta and IVC:  Unremarkable    Assessment and Plan:    Chronic Hepatitis B:    -Based on the available data, it does appear the patient may have chronic hepatitis B.  However there is discrepancy in the presence of surface antigen between June 2019 and November 2020  -We will certainly need to order more labs to further evaluate for activity of chronic hepatitis B, it is also possible that the patient may have a coinfection with hepatitis delta; particularly given his country of origin  -The abdominal ultrasound from a year and a half ago does suggest he may have hepatic fibrosis with a degree of portal hypertension and we do need to investigate this further as well     Chronic hepatitis B labs, hepatitis delta antibody ordered   Abd US with elastography ordered      Chronic Hepatitis B Education:  - Patient was educated as to mode of spread of virus  - Encouraged to avoid sharing personal items such as: toothbrushes, nail clippers, razors.  If shared, they should all be cleaned thoroughly.  - Recommend all family members be screened for  hepatitis B and vaccinated if they are not infected.    Screening for Hepatocellular Carcinoma:  - Getting an abdominal US now    Follow Up:  3 months     Thank you very much for the opportunity to participate in the care of this patient.  If you have any further questions, please don't hesitate to contact our office.    Thomas M. Leventhal, M.D.   of Medicine  Advanced & Transplant Hepatology  [facility]      Phone call duration: 17 minutes

## 2020-12-07 NOTE — PROGRESS NOTES
"Geronimo Coats is a 60 year old male who is being evaluated via a billable telephone visit.      The patient has been notified of following:     \"This telephone visit will be conducted via a call between you and your physician/provider. We have found that certain health care needs can be provided without the need for a physical exam.  This service lets us provide the care you need with a short phone conversation.  If a prescription is necessary we can send it directly to your pharmacy.  If lab work is needed we can place an order for that and you can then stop by our lab to have the test done at a later time.    Telephone visits are billed at different rates depending on your insurance coverage. During this emergency period, for some insurers they may be billed the same as an in-person visit.  Please reach out to your insurance provider with any questions.    If during the course of the call the physician/provider feels a telephone visit is not appropriate, you will not be charged for this service.\"    Patient has given verbal consent for Telephone visit?  Yes    What phone number would you like to be contacted at? [phone]    How would you like to obtain your AVS? Mail a copy    Date of Service: 12/7/2020     Subjective:            Geronimo Coats is a 60 year old male presenting for evaluation of liver disease    History of Present Illness   Geronimo Coats is a 60 year old male with past medical history of hypertension who presents in consultation with concern of labs suggestive of hepatitis B and imaging concerning for hepatic fibrosis.    The history is difficult to obtain as there is a lot of noise on the patient's side of the call, but he reports he was made aware he had some type of hepatitis a few years ago and he followed with a doctor at [facility].  He denies ever being on treatment for his hepatitis B.    Noted that he had hepatitis labs checked in June 2019 which demonstrated positive surface " antigen and positive core antibody.  Labs were rechecked in November 2020 which now demonstrated surface antigen was negative, surface antibody was not protective at 0.79, core antibody was positive.  Abdominal ultrasound was performed in June 2019 demonstrated heterogeneous echotexture and lobular contour of the liver without intrahepatic lesion identified.  The spleen measured 13 cm on this exam.    Past Medical History:  Hypertension    Surgical History:  Cholecystectomy    Social History:  Social History     Tobacco Use     Smoking status: Never Smoker     Smokeless tobacco: Never Used   Substance Use Topics     Alcohol use: Yes     Comment: OCCATIONALLY 2-3 drinks per year     Drug use: Never       Family History:  No known family history of liver disease    Has children, still in [facility]  Immigrated from [facility] in 2012    Medications:  Current Outpatient Medications   Medication     aspirin 81 MG EC tablet     metoprolol succinate ER (TOPROL-XL) 100 MG 24 hr tablet     No current facility-administered medications for this visit.        Review of Systems  A complete 10 point review of systems was asked and answered in the negative unless specifically commented upon in the HPI    Objective:           There were no vitals filed for this visit.  There is no height or weight on file to calculate BMI.     Physical Exam    Labs and Diagnostic tests:  Lab Results   Component Value Date     11/13/2019    POTASSIUM 2.9 11/13/2019    CHLORIDE 107 11/13/2019    CO2 28 11/13/2019    BUN 15 11/13/2019    CR 1.12 11/13/2019     Lab Results   Component Value Date    BILITOTAL 0.8 11/24/2020    ALT 58 11/24/2020    AST 43 11/24/2020    ALKPHOS 90 11/24/2020     Lab Results   Component Value Date    ALBUMIN 3.7 11/24/2020    PROTTOTAL 7.6 11/24/2020      Lab Results   Component Value Date    WBC 3.5 04/27/2019    HGB 16.8 04/27/2019    MCV 83 04/27/2019    PLT 96 04/27/2019     Imaging:  Abd US 6/2019  FINDINGS:     Gallbladder: Gallbladder is absent consistent with previous  cholecystectomy.     Bile ducts:   CHD is normal diameter.  No intrahepatic biliary  dilatation.     Liver: The liver has a heterogeneous echotexture and lobular contour  suggesting cirrhosis. No definite intrahepatic lesions are identified.     Pancreas:  Visualized portions of the pancreas are unremarkable.     Spleen:  The spleen measures 13 cm in length which is at the upper  limits of normal.     Right kidney:  Unremarkable     Left kidney:  There are cysts at the upper pole of the left kidney.  The largest measures 2.8 x 3.0 x 3.1 cm.     Aorta and IVC:  Unremarkable    Assessment and Plan:    Chronic Hepatitis B:    -Based on the available data, it does appear the patient may have chronic hepatitis B.  However there is discrepancy in the presence of surface antigen between June 2019 and November 2020  -We will certainly need to order more labs to further evaluate for activity of chronic hepatitis B, it is also possible that the patient may have a coinfection with hepatitis delta; particularly given his country of origin  -The abdominal ultrasound from a year and a half ago does suggest he may have hepatic fibrosis with a degree of portal hypertension and we do need to investigate this further as well     Chronic hepatitis B labs, hepatitis delta antibody ordered   Abd US with elastography ordered      Chronic Hepatitis B Education:  - Patient was educated as to mode of spread of virus  - Encouraged to avoid sharing personal items such as: toothbrushes, nail clippers, razors.  If shared, they should all be cleaned thoroughly.  - Recommend all family members be screened for hepatitis B and vaccinated if they are not infected.    Screening for Hepatocellular Carcinoma:  - Getting an abdominal US now    Follow Up:  3 months     Thank you very much for the opportunity to participate in the care of this patient.  If you have any further questions,  please don't hesitate to contact our office.    Thomas M. Leventhal, M.D.   of Medicine  Advanced & Transplant Hepatology  [facility]      Phone call duration: 17 minutes

## 2020-12-09 LAB — HEMOCCULT STL QL IA: NEGATIVE

## 2021-03-27 VITALS
DIASTOLIC BLOOD PRESSURE: 117 MMHG | OXYGEN SATURATION: 100 % | SYSTOLIC BLOOD PRESSURE: 190 MMHG | TEMPERATURE: 97.6 F | RESPIRATION RATE: 14 BRPM | HEART RATE: 67 BPM

## 2021-03-27 PROCEDURE — 250N000013 HC RX MED GY IP 250 OP 250 PS 637: Performed by: EMERGENCY MEDICINE

## 2021-03-27 PROCEDURE — 99284 EMERGENCY DEPT VISIT MOD MDM: CPT | Mod: 25

## 2021-03-27 PROCEDURE — 29125 APPL SHORT ARM SPLINT STATIC: CPT | Mod: RT

## 2021-03-27 RX ORDER — IBUPROFEN 600 MG/1
600 TABLET, FILM COATED ORAL ONCE
Status: COMPLETED | OUTPATIENT
Start: 2021-03-27 | End: 2021-03-27

## 2021-03-27 RX ADMIN — IBUPROFEN 600 MG: 600 TABLET, FILM COATED ORAL at 22:22

## 2021-03-28 ENCOUNTER — HOSPITAL ENCOUNTER (EMERGENCY)
Facility: CLINIC | Age: 61
Discharge: HOME | End: 2021-03-28
Attending: EMERGENCY MEDICINE | Admitting: EMERGENCY MEDICINE
Payer: COMMERCIAL

## 2021-03-28 ENCOUNTER — APPOINTMENT (OUTPATIENT)
Dept: GENERAL RADIOLOGY | Facility: CLINIC | Age: 61
End: 2021-03-28
Attending: EMERGENCY MEDICINE
Payer: COMMERCIAL

## 2021-03-28 DIAGNOSIS — M79.641 PAIN OF RIGHT HAND: ICD-10-CM

## 2021-03-28 PROCEDURE — 73110 X-RAY EXAM OF WRIST: CPT | Mod: RT

## 2021-03-28 ASSESSMENT — ENCOUNTER SYMPTOMS
FEVER: 0
JOINT SWELLING: 1
ARTHRALGIAS: 1

## 2021-03-28 NOTE — ED TRIAGE NOTES
C/O R hand pain today. Trace edema in R hand/wrist. Denies injury. CMS in tact, pulses present. ibupfrofen taken 1500. ABC in tact. A/Ox4

## 2021-03-28 NOTE — DISCHARGE INSTRUCTIONS
Take ibuprofen 600 mg 3x per day.  This will provide both pain control and fight against inflammation.  Wear wrist splint.  See hand specialist this week for recheck.    Return to emergency department for increasing pain, redness, swelling or fever.

## 2021-03-28 NOTE — ED PROVIDER NOTES
History   Chief Complaint:  Hand Pain     The history is provided by the patient.      Geronimo Coats is a 60 year old male with a history of hypertension who presents with right hand pain. The patient states that he has had 4 days of right handed pain with swelling and redness. He states that he has troubles bending his wrist. He denies troubles moving his fingers, history of gout, or fever. He states that he has been taking pain medications for his hand.     Review of Systems   Constitutional: Negative for fever.   Musculoskeletal: Positive for arthralgias (of right hand with swelling and redness) and joint swelling.   All other systems reviewed and are negative.        Allergies:  Chocolate  Nuts  Orange Fruit [Citrus]    Medications:  Aspirin 81 MG EC tablet  Metoprolol succinate ER     Past Medical History:    Hepatitis B chronic  Hypertension     Past Surgical History:    Hernia repair   Cholecystectomy     Family History:    Heart disease, father     Social History:  Smoking status: never smoker  Alcohol use: yes  Drug use: no  PCP: Bruna Garcia  Presents to the ED alone    Physical Exam     Patient Vitals for the past 24 hrs:   BP Temp Pulse Resp SpO2   03/27/21 2221 (!) 190/117 -- -- -- --   03/27/21 2217 -- 97.6  F (36.4  C) 67 14 100 %       Physical Exam  Gen: alert, answers questions appropriately  CV: right hand warm and well perfused, 2+ radial pulse  Pulm: normal resp effort  MSK: no tenderness over the shoulder, elbow or hand,  mild redness but more tenderness than swelling at the carpal-metacarcapal joint. Able to flex at the wrist. , no snuffbox tenderness, full AROM of fingers and elbow  Skin: no redness over the wrist  Neuro: right hand: 5/5 grasp, 5/5 finger opposition, 5/5 finger adduction, 5/5 wrist flexion, 5/5 wrist extension, 5/5 elbow flexion, 5/5 elbow extension, 5/5 shoulder abduction, sensation intact intact to light tough in radial, median and ulnar nerve  distributions.     Emergency Department Course     Imaging:  XR wrist right :  Normal joint spaces and alignment. No fracture. Mild degenerative changes at first CMC joint. Mild soft tissue swelling dorsally.      Reading per radiology      Procedures         Emergency Department Course:  Reviewed:  I reviewed the patient's nursing notes, vitals, past medical records, Care Everywhere.     Assessments:  0104 I performed an assessment and examination of the patient as noted above.         0130 Findings and plan explained to the Patient. Patient discharged home with instructions regarding supportive care, medications, and reasons to return. The importance of close follow-up was reviewed.     Interventions:  2222 Ibuprofen, 600 mg, PO     Disposition:  The patient was discharged to home.       Impression & Plan   Medical Decision Making:  Geronimo Coats is a 60 year old male who presents with atraumatic right hand pain. 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 This is the area of the patient's tenderness. Differential diagnosis of gout versus septic joint versus osteoarthitis. No provocative factors, break in skin or fevers to suggest high risk of infection. At this point, based on appearance, suspect this is more likely either gout or arthritis pain. Will splint- velcro thumb spica provided. Patient was proved ibuprofen. Will follow up with hand surgery- [facility] voicemail sent. Suggested to the patient the possibility of early infection and the need to return for increasing pain, redness, or swelling or fever. Discharge home.      Diagnosis:    ICD-10-CM    1. Pain of right hand  M79.641     osteoarthritis right hand versus gout       Scribe Disclosure:  Shannon MCELROY, am serving as a scribe at 1:04 AM on 3/28/2021 to document services personally performed by Dunia Crawford MD based on my observations and the provider's statements to me.           Dunia Crawford MD  03/28/21  [id number]

## 2021-06-07 ENCOUNTER — HOSPITAL ENCOUNTER (EMERGENCY)
Facility: CLINIC | Age: 61
Discharge: HOME | End: 2021-06-07
Attending: PHYSICIAN ASSISTANT | Admitting: PHYSICIAN ASSISTANT
Payer: COMMERCIAL

## 2021-06-07 ENCOUNTER — APPOINTMENT (OUTPATIENT)
Dept: GENERAL RADIOLOGY | Facility: CLINIC | Age: 61
End: 2021-06-07
Attending: PHYSICIAN ASSISTANT
Payer: COMMERCIAL

## 2021-06-07 VITALS
HEART RATE: 84 BPM | DIASTOLIC BLOOD PRESSURE: 110 MMHG | TEMPERATURE: 97.7 F | OXYGEN SATURATION: 100 % | RESPIRATION RATE: 15 BRPM | SYSTOLIC BLOOD PRESSURE: 154 MMHG

## 2021-06-07 DIAGNOSIS — I10 ELEVATED BLOOD PRESSURE READING WITH DIAGNOSIS OF HYPERTENSION: ICD-10-CM

## 2021-06-07 DIAGNOSIS — Z87.19 HISTORY OF GASTROESOPHAGEAL REFLUX (GERD): ICD-10-CM

## 2021-06-07 DIAGNOSIS — D72.819 LEUKOPENIA: ICD-10-CM

## 2021-06-07 DIAGNOSIS — D69.6 THROMBOCYTOPENIA (H): ICD-10-CM

## 2021-06-07 DIAGNOSIS — R07.9 CHEST PAIN, UNSPECIFIED: ICD-10-CM

## 2021-06-07 LAB
ANION GAP SERPL CALCULATED.3IONS-SCNC: 5 MMOL/L (ref 3–14)
BASOPHILS # BLD AUTO: 0 10E9/L (ref 0–0.2)
BASOPHILS NFR BLD AUTO: 0.3 %
BUN SERPL-MCNC: 17 MG/DL (ref 7–30)
CALCIUM SERPL-MCNC: 9 MG/DL (ref 8.5–10.1)
CHLORIDE SERPL-SCNC: 109 MMOL/L (ref 94–109)
CO2 SERPL-SCNC: 27 MMOL/L (ref 20–32)
CREAT SERPL-MCNC: 1.3 MG/DL (ref 0.66–1.25)
DIFFERENTIAL METHOD BLD: ABNORMAL
EOSINOPHIL # BLD AUTO: 0.1 10E9/L (ref 0–0.7)
EOSINOPHIL NFR BLD AUTO: 3.6 %
ERYTHROCYTE [DISTWIDTH] IN BLOOD BY AUTOMATED COUNT: 13.1 % (ref 10–15)
GFR SERPL CREATININE-BSD FRML MDRD: 58 ML/MIN/{1.73_M2}
GLUCOSE SERPL-MCNC: 93 MG/DL (ref 70–99)
HCT VFR BLD AUTO: 46.3 % (ref 40–53)
HGB BLD-MCNC: 15.5 G/DL (ref 13.3–17.7)
IMM GRANULOCYTES # BLD: 0 10E9/L (ref 0–0.4)
IMM GRANULOCYTES NFR BLD: 0.3 %
INTERPRETATION ECG - MUSE: NORMAL
LYMPHOCYTES # BLD AUTO: 1.5 10E9/L (ref 0.8–5.3)
LYMPHOCYTES NFR BLD AUTO: 39.9 %
MCH RBC QN AUTO: 28.2 PG (ref 26.5–33)
MCHC RBC AUTO-ENTMCNC: 33.5 G/DL (ref 31.5–36.5)
MCV RBC AUTO: 84 FL (ref 78–100)
MONOCYTES # BLD AUTO: 0.4 10E9/L (ref 0–1.3)
MONOCYTES NFR BLD AUTO: 10.7 %
NEUTROPHILS # BLD AUTO: 1.6 10E9/L (ref 1.6–8.3)
NEUTROPHILS NFR BLD AUTO: 45.2 %
NRBC # BLD AUTO: 0 10*3/UL
NRBC BLD AUTO-RTO: 0 /100
PLATELET # BLD AUTO: 116 10E9/L (ref 150–450)
POTASSIUM SERPL-SCNC: 2.9 MMOL/L (ref 3.4–5.3)
RBC # BLD AUTO: 5.49 10E12/L (ref 4.4–5.9)
SODIUM SERPL-SCNC: 141 MMOL/L (ref 133–144)
TROPONIN I SERPL-MCNC: <0.015 UG/L (ref 0–0.04)
WBC # BLD AUTO: 3.6 10E9/L (ref 4–11)

## 2021-06-07 PROCEDURE — 99285 EMERGENCY DEPT VISIT HI MDM: CPT | Mod: 25

## 2021-06-07 PROCEDURE — 84484 ASSAY OF TROPONIN QUANT: CPT | Performed by: EMERGENCY MEDICINE

## 2021-06-07 PROCEDURE — 258N000003 HC RX IP 258 OP 636: Performed by: PHYSICIAN ASSISTANT

## 2021-06-07 PROCEDURE — 71046 X-RAY EXAM CHEST 2 VIEWS: CPT

## 2021-06-07 PROCEDURE — 250N000013 HC RX MED GY IP 250 OP 250 PS 637: Performed by: PHYSICIAN ASSISTANT

## 2021-06-07 PROCEDURE — 85025 COMPLETE CBC W/AUTO DIFF WBC: CPT | Performed by: EMERGENCY MEDICINE

## 2021-06-07 PROCEDURE — 93005 ELECTROCARDIOGRAM TRACING: CPT

## 2021-06-07 PROCEDURE — 96360 HYDRATION IV INFUSION INIT: CPT

## 2021-06-07 PROCEDURE — 250N000009 HC RX 250: Performed by: PHYSICIAN ASSISTANT

## 2021-06-07 PROCEDURE — 80048 BASIC METABOLIC PNL TOTAL CA: CPT | Performed by: EMERGENCY MEDICINE

## 2021-06-07 RX ORDER — OMEPRAZOLE 10 MG/1
10 CAPSULE, DELAYED RELEASE ORAL DAILY
Qty: 30 CAPSULE | Refills: 0 | Status: SHIPPED | OUTPATIENT
Start: 2021-06-07 | End: 2021-07-12

## 2021-06-07 RX ORDER — METOPROLOL SUCCINATE 100 MG/1
100 TABLET, EXTENDED RELEASE ORAL DAILY
Qty: 30 TABLET | Refills: 0 | Status: SHIPPED | OUTPATIENT
Start: 2021-06-07 | End: 2021-07-12

## 2021-06-07 RX ORDER — METOPROLOL SUCCINATE 100 MG/1
100 TABLET, EXTENDED RELEASE ORAL DAILY
Qty: 30 TABLET | Refills: 0 | Status: SHIPPED | OUTPATIENT
Start: 2021-06-07 | End: 2021-06-07

## 2021-06-07 RX ORDER — POTASSIUM CHLORIDE 1500 MG/1
40 TABLET, EXTENDED RELEASE ORAL ONCE
Status: COMPLETED | OUTPATIENT
Start: 2021-06-07 | End: 2021-06-07

## 2021-06-07 RX ORDER — OMEPRAZOLE 10 MG/1
10 CAPSULE, DELAYED RELEASE ORAL DAILY
Qty: 30 CAPSULE | Refills: 0 | Status: SHIPPED | OUTPATIENT
Start: 2021-06-07 | End: 2021-06-07

## 2021-06-07 RX ADMIN — LIDOCAINE HYDROCHLORIDE 30 ML: 20 SOLUTION ORAL; TOPICAL at 11:30

## 2021-06-07 RX ADMIN — POTASSIUM CHLORIDE 40 MEQ: 1500 TABLET, EXTENDED RELEASE ORAL at 11:30

## 2021-06-07 RX ADMIN — SODIUM CHLORIDE 1000 ML: 9 INJECTION, SOLUTION INTRAVENOUS at 11:31

## 2021-06-07 ASSESSMENT — ENCOUNTER SYMPTOMS
NAUSEA: 0
FEVER: 0
CHILLS: 0
SORE THROAT: 0
BLOOD IN STOOL: 0
VOMITING: 0
ABDOMINAL PAIN: 0
DIARRHEA: 0

## 2021-06-07 NOTE — ED PROVIDER NOTES
"  History   Chief Complaint:  Chest Pain     HPI   Geronimo Coats is a 61 year old male with history of hypertension and GERD who presents with chest pain. He reports that for the past two months he has been having episodic chest pains that has been consistently worsening over the past two weeks. At home, he tried to treat his pain with rest and cool water, but has not feel his symptoms resolve. The patient rates his current pain as a 7 out of 10 and reports that it is in the center of his chest. This pain is non-radiating and burning. In addition, the patient reports having shortness of breath and a slight cough several weeks ago but denied either of these symptoms today. Upon arrival to the ED, the patient was hypertensive.  Patient notes that he was previously prescribed metoprolol however, he declines taking it for the past month stating that he was unable to see a primary care provider to have the prescription refilled.  Additionally, he notes that he was prescribed omeprazole for GERD however, he reports that he is not taking it for \"several weeks \"as he ran out and needed a refill.  At the time of the exam, the patient denied headache, vision changes, confusion, weakness, sore throat, fever, chills, nausea, vomiting, diarrhea, abdominal pain or blood in stool. The patient also denies having any recent trauma to the chest. Of note, the patient does not currently have a primary care provider.  No additional medical concerns expressed at the time of my exam.    Review of Systems   Constitutional: Negative for chills and fever.   HENT: Negative for sore throat.    Cardiovascular: Positive for chest pain.   Gastrointestinal: Negative for abdominal pain, blood in stool, diarrhea, nausea and vomiting.   All other systems reviewed and are negative.    Allergies:  Chocolate  Nuts  Orange fruit    Medications:  The patient is not currently taking any prescribed medications.    Past Medical History:    Hepatitis " B  Hypertension  GERD    Past Surgical History:    Lap cholecystectomy  Hernia repair    Family History:    Father: heart disease    Social History:  The patient presents with his wife.     Physical Exam     Patient Vitals for the past 24 hrs:   BP Temp Temp src Pulse Resp SpO2   06/07/21 1217 (!) 164/111 -- -- 84 -- --   06/07/21 1200 (!) 171/118 -- -- 73 -- --   06/07/21 1145 (!) 177/112 -- -- 71 -- --   06/07/21 1130 (!) 172/105 -- -- 83 -- --   06/07/21 1117 (!) 181/115 -- -- 89 15 100 %   06/07/21 1006 (!) 162/111 97.7  F (36.5  C) Temporal 92 18 100 %       Physical Exam  Vitals signs and nursing note reviewed.   HENT:      Nose: Nose normal. No congestion or rhinorrhea.      Mouth/Throat:      Mouth: Mucous membranes are moist.  Eyes:      General: No scleral icterus.     Extraocular Movements: Extraocular movements intact.      Conjunctiva/sclera: Conjunctivae normal.   Cardiovascular:      Rate and Rhythm: Regular rhythm. Normal Rate.     Pulses: Normal pulses.      Heart sounds: Normal heart sounds.  No reproducible chest wall tenderness.  Pulmonary:      Effort: Pulmonary effort is normal.      Breath sounds: Normal breath sounds.   Abdominal:      General: Abdomen is flat. Bowel sounds are normal.      Palpations: Abdomen is soft.      Tenderness: There is no abdominal tenderness.   Musculoskeletal: Normal range of motion bilateral upper and lower extremities.  Patient observed ambulating in the ED without difficulty.  Skin:     General: Skin is warm and dry.   Neurological:      Mental Status: Alert. Speech normal. Responds appropriately to questions.   Psychiatric:         Mood and Affect: Mood normal.         Behavior: Behavior normal.      Emergency Department Course   ECG  ECG taken at 1001, ECG read at 1146 by Dr Rizzo.  Sinus rhythm with 1st degree AV block with premature atrial complexes with aberrant conduction. Left ventricular hypertrophy with repolarization abnormality. Abnormal ECG.   Rate  86 bpm. WA interval 212 ms. QRS duration 76 ms. QT/QTc 366/437 ms. P-R-T axes 58 14 95.     Imaging:  XR Chest PA & LAT  There are no acute infiltrates. The cardiac silhouette is  not enlarged. Pulmonary vasculature is unremarkable.   Reading per radiology.    Laboratory:  CBC: WBC 3.6 (L), HGB 15.5,  (L)   BMP: Potassium 2.9 (L), Creatinine 1.30 (H), GFR 58 (L) o/w WNL     Troponin (Collected 1007): <0.015     Emergency Department Course:    Reviewed:  I reviewed nursing notes, vitals, past medical history and care everywhere    Assessments:  1115 I obtained history and examined the patient as noted above.   1203 I rechecked the patient and explained findings. He reports feeling improved after the GI cocktail.   1209 I discussed with the patient his treatment options and result findings.  1233 I updated the patient and his wife again. I answered all of the patent's questions and he remains asymptomatic at this time.     Interventions:  1130 GI Cocktail 30 mL Oral  1130 Klor-Con 40 mEq Oral  1131 NS, 1 L, IV bolus     Disposition:  The patient was discharged to home.       Impression & Plan   Medical Decision Making:  Geronimo Coats is a 61 year old male with history of hypertension and GERD who presents for evaluation of non-exertional, non-radiating midsternal chest pain beginning approximately 2 months prior. Vitals were reviewed. Clinical history and physical exam not felt consistent with PE in the absence of hemoptysis, dyspnea, hypoxia, nor tachycardia. Wells criteria low.  Aortic dissection unlikely based on history and clinical exam as peripheral pulses were felt, no cardiac murmur detected and patient remained hemodynamically stable. CXR was reviewed and shows no evidence of pneumothorax, infiltrate to suggest pneumonia, widened mediastinum to suggest aortic dissection, obvious rib fracture or free air under the diaphragm to suggest perforated viscous ulcer. The patient had a completely benign  abdominal exam without rebound, guarding, or marked tenderness to palpation therefore I have low clinical suspicion that the patient's symptoms are secondary referred pain from an acute intraabdominal process.  Labs obtained.  CBC reveals mild leukopenia and thrombocytopenia.  BMP showed hypokalemia (replaced in the ED) and a slightly elevated creatinine at 1.30. EKG revealed no acute ischemic changes.  Troponin negative. HEART score low. Patient was monitored on telemetry throughout his ED stay.  While in the emergency department, the patient was given a GI cocktail given suspicion for acid reflux.  On recheck, he noted full resolution in his discomfort increasing my suspicion that his presenting symptoms are secondary to GERD in the setting of discontinuing prescribed omeprazole.  Atypical ACS unlikely however, I discussed further assessment by cardiologist in the outpatient setting.  While the patient was in the emergency department, I did offer to order an outpatient echo/stress test.  Both the patient and his wife deferred at this time stating that they wish to follow-up with her primary care provider to discuss further evaluation.  Additionally, as noted above, the patient was hypertensive.  There was no evidence to suggest endorgan damage on his laboratory studies.  The patient was discharged home with a refill on his metoprolol.  He is advised to follow-up with his primary care provider in 1 to 2 days for reassessment.  He was encouraged to return the emergency department immediately if he developed new/worsening chest discomfort, shortness of breath, or any other medical concerns.  All questions and concerns were addressed prior to discharge.    Covid-19  Geronimo Coats was evaluated during a global COVID-19 pandemic, which necessitated consideration that the patient might be at risk for infection with the SARS-CoV-2 virus that causes COVID-19.   Applicable protocols for evaluation were followed during  the patient's care.     Diagnosis:    ICD-10-CM    1. Chest pain, unspecified  R07.9    2. History of gastroesophageal reflux (GERD)  Z87.19    3. Thrombocytopenia (H)  D69.6        Discharge Medications:  Current Discharge Medication List      START taking these medications    Details   omeprazole (PRILOSEC) 10 MG DR capsule Take 1 capsule (10 mg) by mouth daily  Qty: 30 capsule, Refills: 0             Scribe Disclosure:  I, Li England, am serving as a scribe at 11:08 AM on 6/7/2021 to document services personally performed by Marie Sepulveda PA-C based on my observations and the provider's statements to me.     I, Migel Rueda, am serving as a scribe () on 6/7/2021 at 12:31 PM to personally document services performed by Marie Sepulveda PA-C based on my observations and the provider's statements to me.            Marie Sepulveda PA-C  06/07/21 2385

## 2021-06-07 NOTE — DISCHARGE INSTRUCTIONS
Follow-up with your primary care provider in 1 to 2 days for reassessment.  Start your blood pressure medication this evening.  Take omeprazole as prescribed.  As discussed, I would recommend that you follow-up with a cardiologist in the outpatient setting.  Return to the emergency department if you develop fever, chills, new/worsening chest pain, shortness of breath, or any other medical concerns.    As discussed, it is essential that you begin your blood pressure medication this evening.  I recommend that you start omeprazole as well.  Please check your blood pressure at home and write the results down and bring that information to your follow-up appoint with your primary care provider.  Return to the emergency department if you develop new/worsening chest pain, shortness of breath, or any other medical concerns      Discharge Instructions  Chest Pain    You have been seen today for chest pain or discomfort.  At this time, your provider has found no signs that your chest pain is due to a serious or life-threatening condition, (or you have declined more testing and/or admission to the hospital). However, sometimes there is a serious problem that does not show up right away. Your evaluation today may not be complete and you may need further testing and evaluation.     Generally, every Emergency Department visit should have a follow-up clinic visit with either a primary or a specialty clinic/provider. Please follow-up as instructed by your emergency provider today.  Return to the Emergency Department if:  Your chest pain changes, gets worse, starts to happen more often, or comes with less activity.  You are newly short of breath.  You get very weak or tired.  You pass out or faint.  You have any new symptoms, like fever, cough, numb legs, or you cough up blood.  You have anything else that worries you.    Until you follow-up with your regular provider, please do the following:  Take one aspirin daily unless you have  an allergy or are told not to by your provider.  If a stress test appointment has been made, go to the appointment.  If you have questions, contact your regular provider.  Follow-up with your regular provider/clinic as directed; this is very important.    If you were given a prescription for medicine here today, be sure to read all of the information (including the package insert) that comes with your prescription.  This will include important information about the medicine, its side effects, and any warnings that you need to know about.  The pharmacist who fills the prescription can provide more information and answer questions you may have about the medicine.  If you have questions or concerns that the pharmacist cannot address, please call or return to the Emergency Department.       Remember that you can always come back to the Emergency Department if you are not able to see your regular provider in the amount of time listed above, if you get any new symptoms, or if there is anything that worries you.

## 2021-06-07 NOTE — ED TRIAGE NOTES
A&O x4.  ABC's intact.      Pt arrives with c/o chest pain that started 2 months ago that is intermittent.

## 2021-06-09 ENCOUNTER — OFFICE VISIT (OUTPATIENT)
Dept: INTERNAL MEDICINE | Facility: CLINIC | Age: 61
End: 2021-06-09
Payer: COMMERCIAL

## 2021-06-09 VITALS
SYSTOLIC BLOOD PRESSURE: 158 MMHG | DIASTOLIC BLOOD PRESSURE: 80 MMHG | OXYGEN SATURATION: 97 % | RESPIRATION RATE: 16 BRPM | HEART RATE: 66 BPM | HEIGHT: 66 IN | TEMPERATURE: 98.3 F | WEIGHT: 160.1 LBS | BODY MASS INDEX: 25.73 KG/M2

## 2021-06-09 DIAGNOSIS — I10 BENIGN ESSENTIAL HYPERTENSION: Primary | ICD-10-CM

## 2021-06-09 DIAGNOSIS — K21.9 GASTROESOPHAGEAL REFLUX DISEASE WITHOUT ESOPHAGITIS: ICD-10-CM

## 2021-06-09 PROCEDURE — 99203 OFFICE O/P NEW LOW 30 MIN: CPT | Performed by: NURSE PRACTITIONER

## 2021-06-09 ASSESSMENT — MIFFLIN-ST. JEOR: SCORE: 1463.96

## 2021-06-09 ASSESSMENT — PAIN SCALES - GENERAL: PAINLEVEL: NO PAIN (0)

## 2021-06-09 NOTE — NURSING NOTE
"ER follow up: Chest pain  BP (!) 158/80   Pulse 66   Temp 98.3  F (36.8  C) (Oral)   Resp 16   Ht 1.676 m (5' 6\")   Wt 72.6 kg (160 lb 1.6 oz)   SpO2 97%   BMI 25.84 kg/m      "

## 2021-06-09 NOTE — PROGRESS NOTES
"    Assessment & Plan     Benign essential hypertension    - NM Lexiscan stress test; Future    Gastroesophageal reflux disease without esophagitis        BP recheck in 2 weeks  Stress test pending           BMI:   Estimated body mass index is 25.84 kg/m  as calculated from the following:    Height as of this encounter: 1.676 m (5' 6\").    Weight as of this encounter: 72.6 kg (160 lb 1.6 oz).           Lisa Maldonado NP  [facility] JOE Melton is a 61 year old who presents for the following health issues  accompanied by his spouse:    HPI     ED/UC Followup:    Facility:  [facility]  Date of visit: 06/07/21  Reason for visit: Chest Pain  Current Status: Stable, taking meds, no c/o.  Open to stress test       New Patient/Transfer of Care    Review of Systems   CONSTITUTIONAL: NEGATIVE for fever, chills, change in weight  ENT/MOUTH: NEGATIVE for ear, mouth and throat problems  RESP: NEGATIVE for significant cough or SOB  CV: NEGATIVE for chest pain, palpitations or peripheral edema  PSYCHIATRIC: NEGATIVE for changes in mood or affect      Objective    BP (!) 158/80   Pulse 66   Temp 98.3  F (36.8  C) (Oral)   Resp 16   Ht 1.676 m (5' 6\")   Wt 72.6 kg (160 lb 1.6 oz)   SpO2 97%   BMI 25.84 kg/m    Body mass index is 25.84 kg/m .  Physical Exam   GENERAL: healthy, alert and no distress  EYES: Eyes grossly normal to inspection, PERRL and conjunctivae and sclerae normal  NECK: no adenopathy, no asymmetry, masses, or scars and thyroid normal to palpation  RESP: lungs clear to auscultation - no rales, rhonchi or wheezes  CV: regular rate and rhythm, normal S1 S2, no S3 or S4, no murmur, click or rub, no peripheral edema and peripheral pulses strong  ABDOMEN: soft, nontender, no hepatosplenomegaly, no masses and bowel sounds normal  PSYCH: mentation appears normal, affect normal/bright        (I10) Benign essential hypertension  (primary encounter diagnosis)  Comment:   Plan: NM " Lexiscan stress test            (K21.9) Gastroesophageal reflux disease without esophagitis  Comment:   Plan:     Continue current medications

## 2021-06-21 ENCOUNTER — TELEPHONE (OUTPATIENT)
Dept: INTERNAL MEDICINE | Facility: CLINIC | Age: 61
End: 2021-06-21

## 2021-06-21 ENCOUNTER — HOSPITAL ENCOUNTER (OUTPATIENT)
Dept: NUCLEAR MEDICINE | Facility: CLINIC | Age: 61
Setting detail: NUCLEAR MEDICINE
End: 2021-06-21
Attending: NURSE PRACTITIONER
Payer: COMMERCIAL

## 2021-06-21 ENCOUNTER — HOSPITAL ENCOUNTER (OUTPATIENT)
Dept: CARDIOLOGY | Facility: CLINIC | Age: 61
End: 2021-06-21
Attending: NURSE PRACTITIONER
Payer: COMMERCIAL

## 2021-06-21 DIAGNOSIS — I10 BENIGN ESSENTIAL HYPERTENSION: ICD-10-CM

## 2021-06-21 LAB
STRESS ECHO BASELINE DIASTOLIC HE: 98
STRESS ECHO BASELINE HR: 60
STRESS ECHO BASELINE SYSTOLIC BP: 178
STRESS ECHO TARGET HR: 157

## 2021-06-21 PROCEDURE — A9502 TC99M TETROFOSMIN: HCPCS | Performed by: NURSE PRACTITIONER

## 2021-06-21 PROCEDURE — 343N000001 HC RX 343: Performed by: NURSE PRACTITIONER

## 2021-06-21 PROCEDURE — 250N000011 HC RX IP 250 OP 636

## 2021-06-21 PROCEDURE — 93018 CV STRESS TEST I&R ONLY: CPT | Performed by: INTERNAL MEDICINE

## 2021-06-21 PROCEDURE — 78452 HT MUSCLE IMAGE SPECT MULT: CPT | Mod: 26 | Performed by: INTERNAL MEDICINE

## 2021-06-21 PROCEDURE — 93016 CV STRESS TEST SUPVJ ONLY: CPT | Performed by: INTERNAL MEDICINE

## 2021-06-21 PROCEDURE — 93017 CV STRESS TEST TRACING ONLY: CPT

## 2021-06-21 PROCEDURE — 78452 HT MUSCLE IMAGE SPECT MULT: CPT

## 2021-06-21 RX ORDER — REGADENOSON 0.08 MG/ML
INJECTION, SOLUTION INTRAVENOUS
Status: COMPLETED
Start: 2021-06-21 | End: 2021-06-21

## 2021-06-21 RX ADMIN — REGADENOSON 0.4 MG: 0.08 INJECTION, SOLUTION INTRAVENOUS at 12:29

## 2021-06-21 RX ADMIN — TETROFOSMIN 31.8 MCI.: 1.38 INJECTION, POWDER, LYOPHILIZED, FOR SOLUTION INTRAVENOUS at 12:30

## 2021-06-21 RX ADMIN — TETROFOSMIN 11 MCI.: 1.38 INJECTION, POWDER, LYOPHILIZED, FOR SOLUTION INTRAVENOUS at 11:00

## 2021-06-21 NOTE — LETTER
July 1, 2021      Geronimo Coats  [address] [facility] [place]   [place] [address]        Dear ,    We are writing to inform you of your test results.    Negative stress test, please follow op on HTN as planned.      If you have any questions or concerns, please call the clinic at the number listed above.       Sincerely,      Lisa Maldonado NP.

## 2021-06-30 NOTE — TELEPHONE ENCOUNTER
Patient's home/cell number message on machine to call back.  Patient's emergency number message on machine to call back.

## 2021-07-12 ENCOUNTER — OFFICE VISIT (OUTPATIENT)
Dept: INTERNAL MEDICINE | Facility: CLINIC | Age: 61
End: 2021-07-12
Payer: COMMERCIAL

## 2021-07-12 VITALS
SYSTOLIC BLOOD PRESSURE: 176 MMHG | TEMPERATURE: 98.4 F | WEIGHT: 161 LBS | HEIGHT: 66 IN | HEART RATE: 76 BPM | OXYGEN SATURATION: 96 % | BODY MASS INDEX: 25.88 KG/M2 | DIASTOLIC BLOOD PRESSURE: 102 MMHG

## 2021-07-12 DIAGNOSIS — I10 BENIGN ESSENTIAL HYPERTENSION: Primary | ICD-10-CM

## 2021-07-12 PROCEDURE — 99213 OFFICE O/P EST LOW 20 MIN: CPT | Performed by: NURSE PRACTITIONER

## 2021-07-12 RX ORDER — LOSARTAN POTASSIUM 50 MG/1
50 TABLET ORAL DAILY
Qty: 90 TABLET | Refills: 3 | Status: SHIPPED | OUTPATIENT
Start: 2021-07-12 | End: 2022-03-17

## 2021-07-12 RX ORDER — OMEPRAZOLE 10 MG/1
10 CAPSULE, DELAYED RELEASE ORAL DAILY
Qty: 90 CAPSULE | Refills: 3 | Status: SHIPPED | OUTPATIENT
Start: 2021-07-12 | End: 2022-03-17

## 2021-07-12 RX ORDER — METOPROLOL SUCCINATE 100 MG/1
100 TABLET, EXTENDED RELEASE ORAL DAILY
Qty: 90 TABLET | Refills: 3 | Status: SHIPPED | OUTPATIENT
Start: 2021-07-12 | End: 2022-03-17

## 2021-07-12 ASSESSMENT — MIFFLIN-ST. JEOR: SCORE: 1468.04

## 2021-07-12 NOTE — PROGRESS NOTES
"    Assessment & Plan     Benign essential hypertension    - metoprolol succinate ER (TOPROL-XL) 100 MG 24 hr tablet; Take 1 tablet (100 mg) by mouth daily  - omeprazole (PRILOSEC) 10 MG DR capsule; Take 1 capsule (10 mg) by mouth daily  - losartan (COZAAR) 50 MG tablet; Take 1 tablet (50 mg) by mouth daily                 Return in about 4 weeks (around 8/9/2021) for BP Recheck.    Lisa Maldonado NP  [facility] JOE Melton is a 61 year old who presents for the following health issues     HPI     Hypertension Follow-up      Do you check your blood pressure regularly outside of the clinic? Yes     Are you following a low salt diet? Yes    Are your blood pressures ever more than 140 on the top number (systolic) OR more   than 90 on the bottom number (diastolic), for example 140/90? No      How many servings of fruits and vegetables do you eat daily?  0-1    On average, how many sweetened beverages do you drink each day (Examples: soda, juice, sweet tea, etc.  Do NOT count diet or artificially sweetened beverages)?   0    How many days per week do you exercise enough to make your heart beat faster? minimal    How many minutes a day do you exercise enough to make your heart beat faster? N/A    How many days per week do you miss taking your medication? 0        Review of Systems   Constitutional, HEENT, cardiovascular, pulmonary, gi and gu systems are negative, except as otherwise noted.      Objective    BP (!) 176/102 (BP Location: Left arm, Patient Position: Sitting, Cuff Size: Adult Regular)   Pulse 76   Temp 98.4  F (36.9  C) (Oral)   Ht 1.676 m (5' 6\")   Wt 73 kg (161 lb)   SpO2 96%   BMI 25.99 kg/m    Body mass index is 25.99 kg/m .  Physical Exam   GENERAL: healthy, alert and no distress  EYES: Eyes grossly normal to inspection, PERRL and conjunctivae and sclerae normal  NECK: no adenopathy, no asymmetry, masses, or scars and thyroid normal to palpation  RESP: " lungs clear to auscultation - no rales, rhonchi or wheezes  CV: regular rate and rhythm, normal S1 S2, no S3 or S4, no murmur, click or rub, no peripheral edema and peripheral pulses strong  PSYCH: mentation appears normal, affect normal/bright

## 2022-03-17 DIAGNOSIS — I10 BENIGN ESSENTIAL HYPERTENSION: ICD-10-CM

## 2022-03-17 RX ORDER — LOSARTAN POTASSIUM 50 MG/1
50 TABLET ORAL DAILY
Qty: 90 TABLET | Refills: 1 | Status: SHIPPED | OUTPATIENT
Start: 2022-03-17 | End: 2022-04-08

## 2022-03-17 RX ORDER — METOPROLOL SUCCINATE 100 MG/1
100 TABLET, EXTENDED RELEASE ORAL DAILY
Qty: 90 TABLET | Refills: 1 | Status: SHIPPED | OUTPATIENT
Start: 2022-03-17 | End: 2022-10-05

## 2022-03-17 RX ORDER — OMEPRAZOLE 10 MG/1
10 CAPSULE, DELAYED RELEASE ORAL DAILY
Qty: 90 CAPSULE | Refills: 1 | Status: SHIPPED | OUTPATIENT
Start: 2022-03-17 | End: 2022-06-07

## 2022-03-17 NOTE — TELEPHONE ENCOUNTER
Call received from patient requesting refills. Patient has moved and has a new pharmacy. Call to pharmacy. Patient still has 3 refills remaining on his medications.    Prescription approved per [facility] Refill Protocol.

## 2022-04-08 ENCOUNTER — OFFICE VISIT (OUTPATIENT)
Dept: FAMILY MEDICINE | Facility: CLINIC | Age: 62
End: 2022-04-08
Payer: COMMERCIAL

## 2022-04-08 VITALS
RESPIRATION RATE: 16 BRPM | WEIGHT: 162 LBS | TEMPERATURE: 98.2 F | HEIGHT: 66 IN | DIASTOLIC BLOOD PRESSURE: 92 MMHG | BODY MASS INDEX: 26.03 KG/M2 | SYSTOLIC BLOOD PRESSURE: 158 MMHG | OXYGEN SATURATION: 100 % | HEART RATE: 70 BPM

## 2022-04-08 DIAGNOSIS — Z12.11 SCREEN FOR COLON CANCER: ICD-10-CM

## 2022-04-08 DIAGNOSIS — I10 BENIGN ESSENTIAL HYPERTENSION: Primary | ICD-10-CM

## 2022-04-08 DIAGNOSIS — B18.1 HEPATITIS B, CHRONIC (H): ICD-10-CM

## 2022-04-08 DIAGNOSIS — D69.6 THROMBOCYTOPENIA (H): ICD-10-CM

## 2022-04-08 LAB
ALBUMIN SERPL-MCNC: 3.8 G/DL (ref 3.4–5)
ALP SERPL-CCNC: 85 U/L (ref 40–150)
ALT SERPL W P-5'-P-CCNC: 52 U/L (ref 0–70)
ANION GAP SERPL CALCULATED.3IONS-SCNC: 7 MMOL/L (ref 3–14)
AST SERPL W P-5'-P-CCNC: 34 U/L (ref 0–45)
BILIRUB SERPL-MCNC: 0.5 MG/DL (ref 0.2–1.3)
BUN SERPL-MCNC: 20 MG/DL (ref 7–30)
CALCIUM SERPL-MCNC: 9.4 MG/DL (ref 8.5–10.1)
CHLORIDE BLD-SCNC: 110 MMOL/L (ref 94–109)
CO2 SERPL-SCNC: 27 MMOL/L (ref 20–32)
CREAT SERPL-MCNC: 1.33 MG/DL (ref 0.66–1.25)
ERYTHROCYTE [DISTWIDTH] IN BLOOD BY AUTOMATED COUNT: 14.1 % (ref 10–15)
GFR SERPL CREATININE-BSD FRML MDRD: 60 ML/MIN/1.73M2
GLUCOSE BLD-MCNC: 111 MG/DL (ref 70–99)
HCT VFR BLD AUTO: 44.1 % (ref 40–53)
HGB BLD-MCNC: 15 G/DL (ref 13.3–17.7)
HOLD SPECIMEN: NORMAL
MCH RBC QN AUTO: 28.6 PG (ref 26.5–33)
MCHC RBC AUTO-ENTMCNC: 34 G/DL (ref 31.5–36.5)
MCV RBC AUTO: 84 FL (ref 78–100)
PLATELET # BLD AUTO: 102 10E3/UL (ref 150–450)
POTASSIUM BLD-SCNC: 3.3 MMOL/L (ref 3.4–5.3)
PROT SERPL-MCNC: 7.3 G/DL (ref 6.8–8.8)
RBC # BLD AUTO: 5.24 10E6/UL (ref 4.4–5.9)
SODIUM SERPL-SCNC: 144 MMOL/L (ref 133–144)
WBC # BLD AUTO: 3.6 10E3/UL (ref 4–11)

## 2022-04-08 PROCEDURE — 36415 COLL VENOUS BLD VENIPUNCTURE: CPT | Performed by: NURSE PRACTITIONER

## 2022-04-08 PROCEDURE — 90732 PPSV23 VACC 2 YRS+ SUBQ/IM: CPT | Performed by: NURSE PRACTITIONER

## 2022-04-08 PROCEDURE — 99214 OFFICE O/P EST MOD 30 MIN: CPT | Mod: 25 | Performed by: NURSE PRACTITIONER

## 2022-04-08 PROCEDURE — 90471 IMMUNIZATION ADMIN: CPT | Performed by: NURSE PRACTITIONER

## 2022-04-08 PROCEDURE — 85027 COMPLETE CBC AUTOMATED: CPT | Performed by: NURSE PRACTITIONER

## 2022-04-08 PROCEDURE — 80053 COMPREHEN METABOLIC PANEL: CPT | Performed by: NURSE PRACTITIONER

## 2022-04-08 RX ORDER — LOSARTAN POTASSIUM 100 MG/1
100 TABLET ORAL DAILY
Qty: 90 TABLET | Refills: 3 | Status: ON HOLD | OUTPATIENT
Start: 2022-04-08 | End: 2022-10-05

## 2022-04-08 ASSESSMENT — PAIN SCALES - GENERAL: PAINLEVEL: NO PAIN (0)

## 2022-04-08 NOTE — PROGRESS NOTES
"  Assessment & Plan     Benign essential hypertension  BP is not controlled. Will increase dose of losartan as noted below. Continue current dose metoprolol.   - losartan (COZAAR) 100 MG tablet; Take 1 tablet (100 mg) by mouth daily  - Comprehensive metabolic panel (BMP + Alb, Alk Phos, ALT, AST, Total. Bili, TP)    Thrombocytopenia (H)  Will recheck blood levels to evaluate stability.   - CBC with Platelets and Reflex to Iron Studies    Hepatitis B, chronic (H)  - PNEUMOCOCCAL VACCINE,ADULT,SQ OR IM ([id number])  - Comprehensive metabolic panel (BMP + Alb, Alk Phos, ALT, AST, Total. Bili, TP)    Screen for colon cancer  - Fecal colorectal cancer screen FIT - Future (S+30)       BMI:   Estimated body mass index is 26.15 kg/m  as calculated from the following:    Height as of this encounter: 1.676 m (5' 6\").    Weight as of this encounter: 73.5 kg (162 lb).   Weight management plan: Discussed healthy diet and exercise guidelines    Return in 4 weeks (on 5/6/2022) for Physical and blood pressure.    NIKOLAY Gao CNP  [facility] SHAREE Melton is a 61 year old who presents for the following health issues     HPI     Hypertension Follow-up      Do you check your blood pressure regularly outside of the clinic? Yes     Are you following a low salt diet? No    Are your blood pressures ever more than 140 on the top number (systolic) OR more   than 90 on the bottom number (diastolic), for example 140/90? Yes    How many servings of fruits and vegetables do you eat daily?  0-1    On average, how many sweetened beverages do you drink each day (Examples: soda, juice, sweet tea, etc.  Do NOT count diet or artificially sweetened beverages)?   0    How many days per week do you exercise enough to make your heart beat faster? 0    How many minutes a day do you exercise enough to make your heart beat faster? 0    How many days per week do you miss taking your medication? 0    Review of " "Systems   Constitutional, HEENT, cardiovascular, pulmonary, GI, , musculoskeletal, neuro, skin, endocrine and psych systems are negative, except as otherwise noted.      Objective    BP (!) 158/92   Pulse 70   Temp 98.2  F (36.8  C) (Oral)   Resp 16   Ht 1.676 m (5' 6\")   Wt 73.5 kg (162 lb)   SpO2 100%   BMI 26.15 kg/m    Body mass index is 26.15 kg/m .  Physical Exam   GENERAL: healthy, alert and no distress  EYES: Eyes grossly normal to inspection, PERRL and conjunctivae and sclerae normal  RESP: lungs clear to auscultation - no rales, rhonchi or wheezes  CV: regular rate and rhythm, normal S1 S2, no S3 or S4, no murmur, click or rub, no peripheral edema and peripheral pulses strong  PSYCH: mentation appears normal, affect normal/bright          "

## 2022-06-06 NOTE — PROGRESS NOTES
"  Assessment & Plan     Benign essential hypertension  Uncontrolled. Will add additional medication of amlodipine which he has been on in the past without side effects but stopped on his own. Counseled patient that he should be taking losartan 100 mg daily and metoprolol 100 mg daily and to follow a low salt diet. Close follow up in 2 weeks.   - amLODIPine (NORVASC) 2.5 MG tablet; Take 1 tablet (2.5 mg) by mouth daily    Dizziness  History of cerebral infarction  Abnormal CT scan of head  Patient in [facility] ED for hypertension and dizziness with slowed speech on 6/2-6/3/2022. Head CT showed no acute intracranial abnormality but did show multifocal small old infarctions and brain atrophy. Patient felt better after labatolol and IV fluids and was discharged. Patient has not had symptoms of dizziness in the past week but feels \"off\". Counseled to take his aspirin 81 mg daily. Last lipid panel normal so did not start statin. Will refer to neurology for further evaluation.  Counseled on warning signs of when to seek urgent medical care including: return of dizziness, facial drooping, weakness on one side of the body, sudden confusion or vision changes.  - Adult Neurology  Referral; Future    Gastroesophageal reflux disease, unspecified whether esophagitis present  Not well controlled. Will increase omeprazole from 10 mg daily to 20 mg daily.  - omeprazole (PRILOSEC) 20 MG DR capsule; Take 1 capsule (20 mg) by mouth daily    High priority for 2019-nCoV vaccine  - COVID-19,PF,MODERNA (18+ YRS BOOSTER .25ML)    Review of prior external note(s) from - CareEverywhere information from [facility]  reviewed  Review of the result(s) of each unique test - HEAD CT, EKG, CHEM PANEL, CARDIAC MARKERS, lipid  Prescription drug management       Return in about 2 weeks (around 6/21/2022) for hypertension.    Diana Tripathi, NIKOLAY CNP  M [facility] SHAREE Melton is a 62 year old who " "presents for the following health issues  accompanied by his none.    HPI     Hypertension Follow-up      Do you check your blood pressure regularly outside of the clinic? Yes     Are you following a low salt diet? Yes    Are your blood pressures ever more than 140 on the top number (systolic) OR more   than 90 on the bottom number (diastolic), for example 140/90? Yes  States that he is currently taking losartan 50 mg and metoprolol 100 mg daily. Stopped taking the 100 mg losartan because he thought that it was what made his pressure to high and sent him to the hospital last week.    Hospital Follow-up Visit:    Hospital/Nursing Home/IP Rehab Facility: [facility]   Date of Admission: 6/2/2022  Date of Discharge: 6/3/2022  Reason(s) for Admission: Hypertension       Was your hospitalization related to COVID-19? No   Problems taking medications regularly:  None  Medication changes since discharge: None  Problems adhering to non-medication therapy:  Changed dose on his own.    Summary of hospitalization:  CareEverywhere information obtained and reviewed  Diagnostic Tests/Treatments reviewed.  Follow up needed: none  Other Healthcare Providers Involved in Patient s Care:         None  Update since discharge: improved.     Post Discharge Medication Reconciliation: discharge medications reconciled and changed, per note/orders.  Plan of care communicated with patient            Review of Systems   Constitutional, HEENT, cardiovascular, pulmonary, GI, , musculoskeletal, neuro, skin, endocrine and psych systems are negative, except as otherwise noted.      Objective    BP (!) 158/84   Pulse 76   Temp 98.8  F (37.1  C) (Oral)   Resp 16   Ht 1.676 m (5' 6\")   Wt 74.8 kg (165 lb)   SpO2 99%   BMI 26.63 kg/m    Body mass index is 26.63 kg/m .  Physical Exam   GENERAL: healthy, alert and no distress  EYES: Eyes grossly normal to inspection, PERRL and conjunctivae and sclerae normal  RESP: lungs clear to " auscultation - no rales, rhonchi or wheezes  CV: regular rate and rhythm, normal S1 S2, no S3 or S4, no murmur, click or rub, no peripheral edema and peripheral pulses strong  NEURO: Normal strength and tone, mentation intact and speech normal  PSYCH: mentation appears normal, affect normal/bright

## 2022-06-07 ENCOUNTER — OFFICE VISIT (OUTPATIENT)
Dept: FAMILY MEDICINE | Facility: CLINIC | Age: 62
End: 2022-06-07
Payer: COMMERCIAL

## 2022-06-07 VITALS
RESPIRATION RATE: 16 BRPM | BODY MASS INDEX: 26.52 KG/M2 | HEART RATE: 76 BPM | OXYGEN SATURATION: 99 % | SYSTOLIC BLOOD PRESSURE: 158 MMHG | HEIGHT: 66 IN | TEMPERATURE: 98.8 F | DIASTOLIC BLOOD PRESSURE: 84 MMHG | WEIGHT: 165 LBS

## 2022-06-07 DIAGNOSIS — R93.0 ABNORMAL CT SCAN OF HEAD: ICD-10-CM

## 2022-06-07 DIAGNOSIS — I10 BENIGN ESSENTIAL HYPERTENSION: Primary | ICD-10-CM

## 2022-06-07 DIAGNOSIS — R42 DIZZINESS: ICD-10-CM

## 2022-06-07 DIAGNOSIS — K21.9 GASTROESOPHAGEAL REFLUX DISEASE, UNSPECIFIED WHETHER ESOPHAGITIS PRESENT: ICD-10-CM

## 2022-06-07 DIAGNOSIS — Z23 HIGH PRIORITY FOR 2019-NCOV VACCINE: ICD-10-CM

## 2022-06-07 DIAGNOSIS — Z86.73 HISTORY OF CEREBRAL INFARCTION: ICD-10-CM

## 2022-06-07 PROCEDURE — 91306 COVID-19,PF,MODERNA (18+ YRS BOOSTER .25ML): CPT | Performed by: NURSE PRACTITIONER

## 2022-06-07 PROCEDURE — 99214 OFFICE O/P EST MOD 30 MIN: CPT | Mod: 25 | Performed by: NURSE PRACTITIONER

## 2022-06-07 PROCEDURE — 0064A COVID-19,PF,MODERNA (18+ YRS BOOSTER .25ML): CPT | Performed by: NURSE PRACTITIONER

## 2022-06-07 RX ORDER — AMLODIPINE BESYLATE 2.5 MG/1
2.5 TABLET ORAL DAILY
Qty: 30 TABLET | Refills: 0 | Status: SHIPPED | OUTPATIENT
Start: 2022-06-07 | End: 2022-06-21

## 2022-06-20 NOTE — PROGRESS NOTES
"  Assessment & Plan     Benign essential hypertension  Well controlled with medications without side effects. Continue amlodipine 2.5 mg daily, losartan 100 mg daily, and metoprolol 100 mg daily. Refills done. He gets hiccups after taking his medications for a few, counseled on self care techniques to stop hiccups (see AVS).  His dizziness and headaches are now resolved with his BP under control and he feels able to return to work, work note done.   - amLODIPine (NORVASC) 2.5 MG tablet; Take 1 tablet (2.5 mg) by mouth daily    Prescription drug management     See Patient Instructions    Return in about 6 months (around 12/21/2022) for hypertension.    NIKOLAY Gao CNP  M [facility] SHAREE Melton is a 62 year old accompanied by his none., presenting for the following health issues:  Hypertension      HPI     Hypertension Follow-up      Do you check your blood pressure regularly outside of the clinic? Yes     Are you following a low salt diet? No    Are your blood pressures ever more than 140 on the top number (systolic) OR more   than 90 on the bottom number (diastolic), for example 140/90? Yes    He has been off of work since 6/2 due to feeling off, headaches, dizziness with hypertension. He has been on amlodpine for the past 2 weeks along with his losartan and metoprolol and is now feeling much better. Feels like he is able to return to work.     Review of Systems   Constitutional, HEENT, cardiovascular, pulmonary, gi and gu systems are negative, except as otherwise noted.      Objective    /78   Pulse 78   Temp 98.8  F (37.1  C) (Oral)   Resp 14   Ht 1.676 m (5' 6\")   Wt 73 kg (161 lb)   SpO2 100%   BMI 25.99 kg/m    Body mass index is 25.99 kg/m .  Physical Exam   GENERAL: healthy, alert and no distress  EYES: Eyes grossly normal to inspection, PERRL and conjunctivae and sclerae normal  RESP: lungs clear to auscultation - no rales, rhonchi or wheezes  CV: " regular rate and rhythm, normal S1 S2, no S3 or S4, no murmur, click or rub, no peripheral edema and peripheral pulses strong  NEURO: Normal strength and tone, mentation intact and speech normal              .  ..

## 2022-06-21 ENCOUNTER — OFFICE VISIT (OUTPATIENT)
Dept: FAMILY MEDICINE | Facility: CLINIC | Age: 62
End: 2022-06-21
Payer: COMMERCIAL

## 2022-06-21 VITALS
SYSTOLIC BLOOD PRESSURE: 128 MMHG | HEART RATE: 78 BPM | WEIGHT: 161 LBS | RESPIRATION RATE: 14 BRPM | DIASTOLIC BLOOD PRESSURE: 78 MMHG | OXYGEN SATURATION: 100 % | HEIGHT: 66 IN | TEMPERATURE: 98.8 F | BODY MASS INDEX: 25.88 KG/M2

## 2022-06-21 DIAGNOSIS — I10 BENIGN ESSENTIAL HYPERTENSION: ICD-10-CM

## 2022-06-21 PROCEDURE — 99213 OFFICE O/P EST LOW 20 MIN: CPT | Performed by: NURSE PRACTITIONER

## 2022-06-21 RX ORDER — AMLODIPINE BESYLATE 2.5 MG/1
2.5 TABLET ORAL DAILY
Qty: 90 TABLET | Refills: 3 | Status: ON HOLD | OUTPATIENT
Start: 2022-06-21 | End: 2022-10-05

## 2022-06-21 NOTE — LETTER
June 21, 2022      Geronimo Coats  [id number]  [place] [id number]        To Whom It May Concern:    Geronimo Coats was seen in our clinic. He may return to work without restrictions.      Sincerely,        NIKOLAY Gao CNP

## 2022-09-25 NOTE — TELEPHONE ENCOUNTER
11/15/2022-Request for images faxed to [facility] @ 818am    11/16/2022-[facility] Images now in PACS-MR @ 452am    FUTURE VISIT INFORMATION      FUTURE VISIT INFORMATION:    Date: 11/16/2022    Time: 330pm    Location: [facility]  REFERRAL INFORMATION:    Referring provider:  Diana LINK CNP     Referring providers clinic:  [facility]     Reason for visit/diagnosis  Dizziness    RECORDS REQUESTED FROM:       Clinic name Comments Records Status Imaging Status   Internal ANA Tripathi-6/7/2022 Epic No Images          [facility]  ED Visit-6/2/2022    CT Head-6/2/2022 Care Everywhere Requested

## 2022-10-01 ENCOUNTER — HOSPITAL ENCOUNTER (INPATIENT)
Facility: CLINIC | Age: 62
LOS: 4 days | Discharge: HOME | DRG: 065 | End: 2022-10-05
Attending: EMERGENCY MEDICINE | Admitting: STUDENT IN AN ORGANIZED HEALTH CARE EDUCATION/TRAINING PROGRAM
Payer: COMMERCIAL

## 2022-10-01 ENCOUNTER — NURSE TRIAGE (OUTPATIENT)
Dept: NURSING | Facility: CLINIC | Age: 62
End: 2022-10-01

## 2022-10-01 ENCOUNTER — APPOINTMENT (OUTPATIENT)
Dept: MRI IMAGING | Facility: CLINIC | Age: 62
DRG: 065 | End: 2022-10-01
Attending: EMERGENCY MEDICINE
Payer: COMMERCIAL

## 2022-10-01 ENCOUNTER — APPOINTMENT (OUTPATIENT)
Dept: CT IMAGING | Facility: CLINIC | Age: 62
DRG: 065 | End: 2022-10-01
Attending: EMERGENCY MEDICINE
Payer: COMMERCIAL

## 2022-10-01 DIAGNOSIS — Z20.822 LAB TEST NEGATIVE FOR COVID-19 VIRUS: ICD-10-CM

## 2022-10-01 DIAGNOSIS — I63.50 RIGHT PONTINE STROKE (H): ICD-10-CM

## 2022-10-01 DIAGNOSIS — I15.2 HYPERTENSION DUE TO ENDOCRINE DISORDER: Primary | ICD-10-CM

## 2022-10-01 DIAGNOSIS — I63.9 CEREBROVASCULAR ACCIDENT (CVA), UNSPECIFIED MECHANISM (H): ICD-10-CM

## 2022-10-01 DIAGNOSIS — G47.33 OSA (OBSTRUCTIVE SLEEP APNEA): Chronic | ICD-10-CM

## 2022-10-01 DIAGNOSIS — I63.89 CEREBROVASCULAR ACCIDENT (CVA) DUE TO OTHER MECHANISM (H): ICD-10-CM

## 2022-10-01 DIAGNOSIS — I16.0 HYPERTENSIVE URGENCY: ICD-10-CM

## 2022-10-01 DIAGNOSIS — I10 PRIMARY HYPERTENSION: ICD-10-CM

## 2022-10-01 LAB
ANION GAP SERPL CALCULATED.3IONS-SCNC: 8 MMOL/L (ref 7–15)
APTT PPP: 28 SECONDS (ref 22–38)
BASOPHILS # BLD AUTO: 0 10E3/UL (ref 0–0.2)
BASOPHILS NFR BLD AUTO: 0 %
BUN SERPL-MCNC: 17.9 MG/DL (ref 8–23)
CALCIUM SERPL-MCNC: 9.5 MG/DL (ref 8.8–10.2)
CHLORIDE SERPL-SCNC: 106 MMOL/L (ref 98–107)
CREAT BLD-MCNC: 1.1 MG/DL (ref 0.7–1.3)
CREAT SERPL-MCNC: 1.24 MG/DL (ref 0.67–1.17)
DEPRECATED HCO3 PLAS-SCNC: 28 MMOL/L (ref 22–29)
EOSINOPHIL # BLD AUTO: 0.1 10E3/UL (ref 0–0.7)
EOSINOPHIL NFR BLD AUTO: 2 %
ERYTHROCYTE [DISTWIDTH] IN BLOOD BY AUTOMATED COUNT: 13.2 % (ref 10–15)
GFR SERPL CREATININE-BSD FRML MDRD: 65 ML/MIN/1.73M2
GFR SERPL CREATININE-BSD FRML MDRD: >60 ML/MIN/1.73M2
GLUCOSE SERPL-MCNC: 67 MG/DL (ref 70–99)
HCT VFR BLD AUTO: 45.5 % (ref 40–53)
HGB BLD-MCNC: 15.5 G/DL (ref 13.3–17.7)
HOLD SPECIMEN: NORMAL
IMM GRANULOCYTES # BLD: 0 10E3/UL
IMM GRANULOCYTES NFR BLD: 0 %
INR PPP: 1.13 (ref 0.85–1.15)
LYMPHOCYTES # BLD AUTO: 1.9 10E3/UL (ref 0.8–5.3)
LYMPHOCYTES NFR BLD AUTO: 46 %
MCH RBC QN AUTO: 29 PG (ref 26.5–33)
MCHC RBC AUTO-ENTMCNC: 34.1 G/DL (ref 31.5–36.5)
MCV RBC AUTO: 85 FL (ref 78–100)
MONOCYTES # BLD AUTO: 0.4 10E3/UL (ref 0–1.3)
MONOCYTES NFR BLD AUTO: 10 %
NEUTROPHILS # BLD AUTO: 1.7 10E3/UL (ref 1.6–8.3)
NEUTROPHILS NFR BLD AUTO: 42 %
NRBC # BLD AUTO: 0 10E3/UL
NRBC BLD AUTO-RTO: 0 /100
PLATELET # BLD AUTO: 94 10E3/UL (ref 150–450)
POTASSIUM SERPL-SCNC: 3.3 MMOL/L (ref 3.4–5.3)
RADIOLOGIST FLAGS: ABNORMAL
RBC # BLD AUTO: 5.35 10E6/UL (ref 4.4–5.9)
SARS-COV-2 RNA RESP QL NAA+PROBE: NEGATIVE
SODIUM SERPL-SCNC: 142 MMOL/L (ref 136–145)
TROPONIN T SERPL HS-MCNC: 14 NG/L
WBC # BLD AUTO: 4 10E3/UL (ref 4–11)

## 2022-10-01 PROCEDURE — 70551 MRI BRAIN STEM W/O DYE: CPT | Mod: 26 | Performed by: RADIOLOGY

## 2022-10-01 PROCEDURE — 120N000002 HC R&B MED SURG/OB UMMC

## 2022-10-01 PROCEDURE — 36415 COLL VENOUS BLD VENIPUNCTURE: CPT | Performed by: STUDENT IN AN ORGANIZED HEALTH CARE EDUCATION/TRAINING PROGRAM

## 2022-10-01 PROCEDURE — 85025 COMPLETE CBC W/AUTO DIFF WBC: CPT | Performed by: EMERGENCY MEDICINE

## 2022-10-01 PROCEDURE — 93010 ELECTROCARDIOGRAM REPORT: CPT | Performed by: NURSE PRACTITIONER

## 2022-10-01 PROCEDURE — 70498 CT ANGIOGRAPHY NECK: CPT | Mod: 26 | Performed by: RADIOLOGY

## 2022-10-01 PROCEDURE — C9803 HOPD COVID-19 SPEC COLLECT: HCPCS | Performed by: NURSE PRACTITIONER

## 2022-10-01 PROCEDURE — 82565 ASSAY OF CREATININE: CPT

## 2022-10-01 PROCEDURE — 250N000013 HC RX MED GY IP 250 OP 250 PS 637: Performed by: STUDENT IN AN ORGANIZED HEALTH CARE EDUCATION/TRAINING PROGRAM

## 2022-10-01 PROCEDURE — 70450 CT HEAD/BRAIN W/O DYE: CPT | Mod: 26 | Performed by: RADIOLOGY

## 2022-10-01 PROCEDURE — 93005 ELECTROCARDIOGRAM TRACING: CPT | Performed by: NURSE PRACTITIONER

## 2022-10-01 PROCEDURE — 83735 ASSAY OF MAGNESIUM: CPT | Performed by: STUDENT IN AN ORGANIZED HEALTH CARE EDUCATION/TRAINING PROGRAM

## 2022-10-01 PROCEDURE — 96375 TX/PRO/DX INJ NEW DRUG ADDON: CPT | Performed by: NURSE PRACTITIONER

## 2022-10-01 PROCEDURE — 70551 MRI BRAIN STEM W/O DYE: CPT

## 2022-10-01 PROCEDURE — 250N000011 HC RX IP 250 OP 636: Performed by: EMERGENCY MEDICINE

## 2022-10-01 PROCEDURE — 99285 EMERGENCY DEPT VISIT HI MDM: CPT | Performed by: NURSE PRACTITIONER

## 2022-10-01 PROCEDURE — 70450 CT HEAD/BRAIN W/O DYE: CPT

## 2022-10-01 PROCEDURE — 84132 ASSAY OF SERUM POTASSIUM: CPT | Performed by: STUDENT IN AN ORGANIZED HEALTH CARE EDUCATION/TRAINING PROGRAM

## 2022-10-01 PROCEDURE — 70498 CT ANGIOGRAPHY NECK: CPT

## 2022-10-01 PROCEDURE — U0005 INFEC AGEN DETEC AMPLI PROBE: HCPCS | Performed by: NURSE PRACTITIONER

## 2022-10-01 PROCEDURE — 85610 PROTHROMBIN TIME: CPT | Performed by: EMERGENCY MEDICINE

## 2022-10-01 PROCEDURE — 84484 ASSAY OF TROPONIN QUANT: CPT | Performed by: EMERGENCY MEDICINE

## 2022-10-01 PROCEDURE — 99207 PR NO BILLABLE SERVICE THIS VISIT: CPT | Performed by: STUDENT IN AN ORGANIZED HEALTH CARE EDUCATION/TRAINING PROGRAM

## 2022-10-01 PROCEDURE — 99285 EMERGENCY DEPT VISIT HI MDM: CPT | Mod: 25 | Performed by: NURSE PRACTITIONER

## 2022-10-01 PROCEDURE — 250N000011 HC RX IP 250 OP 636: Performed by: STUDENT IN AN ORGANIZED HEALTH CARE EDUCATION/TRAINING PROGRAM

## 2022-10-01 PROCEDURE — 85730 THROMBOPLASTIN TIME PARTIAL: CPT | Performed by: EMERGENCY MEDICINE

## 2022-10-01 PROCEDURE — 82310 ASSAY OF CALCIUM: CPT | Performed by: EMERGENCY MEDICINE

## 2022-10-01 PROCEDURE — 84100 ASSAY OF PHOSPHORUS: CPT | Performed by: STUDENT IN AN ORGANIZED HEALTH CARE EDUCATION/TRAINING PROGRAM

## 2022-10-01 PROCEDURE — 70496 CT ANGIOGRAPHY HEAD: CPT | Mod: 26 | Performed by: RADIOLOGY

## 2022-10-01 PROCEDURE — 36415 COLL VENOUS BLD VENIPUNCTURE: CPT | Performed by: EMERGENCY MEDICINE

## 2022-10-01 PROCEDURE — 96374 THER/PROPH/DIAG INJ IV PUSH: CPT | Mod: 59 | Performed by: NURSE PRACTITIONER

## 2022-10-01 PROCEDURE — 70496 CT ANGIOGRAPHY HEAD: CPT

## 2022-10-01 RX ORDER — METOPROLOL SUCCINATE 50 MG/1
100 TABLET, EXTENDED RELEASE ORAL DAILY
Status: DISCONTINUED | OUTPATIENT
Start: 2022-10-02 | End: 2022-10-01

## 2022-10-01 RX ORDER — CLOPIDOGREL BISULFATE 75 MG/1
300 TABLET ORAL ONCE
Status: COMPLETED | OUTPATIENT
Start: 2022-10-01 | End: 2022-10-01

## 2022-10-01 RX ORDER — LABETALOL HYDROCHLORIDE 5 MG/ML
10-20 INJECTION, SOLUTION INTRAVENOUS EVERY 10 MIN PRN
Status: DISCONTINUED | OUTPATIENT
Start: 2022-10-01 | End: 2022-10-05 | Stop reason: HOSPADM

## 2022-10-01 RX ORDER — CLOPIDOGREL BISULFATE 75 MG/1
75 TABLET ORAL DAILY
Status: DISCONTINUED | OUTPATIENT
Start: 2022-10-02 | End: 2022-10-05 | Stop reason: HOSPADM

## 2022-10-01 RX ORDER — LOSARTAN POTASSIUM 100 MG/1
100 TABLET ORAL DAILY
Status: DISCONTINUED | OUTPATIENT
Start: 2022-10-02 | End: 2022-10-01

## 2022-10-01 RX ORDER — HYDRALAZINE HYDROCHLORIDE 20 MG/ML
10-20 INJECTION INTRAMUSCULAR; INTRAVENOUS
Status: DISCONTINUED | OUTPATIENT
Start: 2022-10-01 | End: 2022-10-05 | Stop reason: HOSPADM

## 2022-10-01 RX ORDER — AMLODIPINE BESYLATE 2.5 MG/1
2.5 TABLET ORAL DAILY
Status: DISCONTINUED | OUTPATIENT
Start: 2022-10-02 | End: 2022-10-01

## 2022-10-01 RX ORDER — ASPIRIN 81 MG/1
81 TABLET, CHEWABLE ORAL DAILY
Status: DISCONTINUED | OUTPATIENT
Start: 2022-10-02 | End: 2022-10-05 | Stop reason: HOSPADM

## 2022-10-01 RX ORDER — ASPIRIN 81 MG/1
81 TABLET ORAL DAILY
Status: DISCONTINUED | OUTPATIENT
Start: 2022-10-02 | End: 2022-10-05 | Stop reason: HOSPADM

## 2022-10-01 RX ORDER — POLYETHYLENE GLYCOL 3350 17 G/17G
17 POWDER, FOR SOLUTION ORAL DAILY PRN
Status: DISCONTINUED | OUTPATIENT
Start: 2022-10-01 | End: 2022-10-05 | Stop reason: HOSPADM

## 2022-10-01 RX ORDER — ATORVASTATIN CALCIUM 40 MG/1
40 TABLET, FILM COATED ORAL EVERY EVENING
Status: DISCONTINUED | OUTPATIENT
Start: 2022-10-01 | End: 2022-10-02

## 2022-10-01 RX ORDER — SODIUM CHLORIDE 9 MG/ML
INJECTION, SOLUTION INTRAVENOUS CONTINUOUS PRN
Status: DISCONTINUED | OUTPATIENT
Start: 2022-10-01 | End: 2022-10-01

## 2022-10-01 RX ORDER — LOSARTAN POTASSIUM 100 MG/1
100 TABLET ORAL DAILY
Status: DISCONTINUED | OUTPATIENT
Start: 2022-10-01 | End: 2022-10-05 | Stop reason: HOSPADM

## 2022-10-01 RX ORDER — IOPAMIDOL 755 MG/ML
75 INJECTION, SOLUTION INTRAVASCULAR ONCE
Status: COMPLETED | OUTPATIENT
Start: 2022-10-01 | End: 2022-10-01

## 2022-10-01 RX ORDER — METOPROLOL SUCCINATE 50 MG/1
100 TABLET, EXTENDED RELEASE ORAL DAILY
Status: DISCONTINUED | OUTPATIENT
Start: 2022-10-01 | End: 2022-10-02 | Stop reason: ALTCHOICE

## 2022-10-01 RX ORDER — AMLODIPINE BESYLATE 2.5 MG/1
2.5 TABLET ORAL DAILY
Status: DISCONTINUED | OUTPATIENT
Start: 2022-10-01 | End: 2022-10-02 | Stop reason: DRUGHIGH

## 2022-10-01 RX ORDER — LIDOCAINE 40 MG/G
CREAM TOPICAL
Status: DISCONTINUED | OUTPATIENT
Start: 2022-10-01 | End: 2022-10-05 | Stop reason: HOSPADM

## 2022-10-01 RX ORDER — ACETAMINOPHEN 325 MG/1
650 TABLET ORAL EVERY 4 HOURS PRN
Status: DISCONTINUED | OUTPATIENT
Start: 2022-10-01 | End: 2022-10-05 | Stop reason: HOSPADM

## 2022-10-01 RX ORDER — AMOXICILLIN 250 MG
1 CAPSULE ORAL 2 TIMES DAILY PRN
Status: DISCONTINUED | OUTPATIENT
Start: 2022-10-01 | End: 2022-10-05 | Stop reason: HOSPADM

## 2022-10-01 RX ADMIN — ATORVASTATIN CALCIUM 40 MG: 40 TABLET, FILM COATED ORAL at 20:30

## 2022-10-01 RX ADMIN — LOSARTAN POTASSIUM 100 MG: 100 TABLET, FILM COATED ORAL at 21:23

## 2022-10-01 RX ADMIN — METOPROLOL SUCCINATE 100 MG: 50 TABLET, EXTENDED RELEASE ORAL at 20:30

## 2022-10-01 RX ADMIN — CLOPIDOGREL BISULFATE 300 MG: 75 TABLET ORAL at 20:30

## 2022-10-01 RX ADMIN — AMLODIPINE BESYLATE 2.5 MG: 2.5 TABLET ORAL at 21:23

## 2022-10-01 RX ADMIN — IOPAMIDOL 75 ML: 755 INJECTION, SOLUTION INTRAVENOUS at 16:38

## 2022-10-01 RX ADMIN — LABETALOL HYDROCHLORIDE 10 MG: 5 INJECTION, SOLUTION INTRAVENOUS at 22:39

## 2022-10-01 ASSESSMENT — ENCOUNTER SYMPTOMS
HEADACHES: 0
FEVER: 0
BACK PAIN: 0
SORE THROAT: 0
ABDOMINAL PAIN: 0
FLANK PAIN: 0
LIGHT-HEADEDNESS: 0
FACIAL ASYMMETRY: 0
COUGH: 0
PALPITATIONS: 0
NAUSEA: 0
STRIDOR: 0
NUMBNESS: 1
SHORTNESS OF BREATH: 0
DIZZINESS: 0
FREQUENCY: 0
TREMORS: 0
CHILLS: 0
DIARRHEA: 0

## 2022-10-01 ASSESSMENT — ACTIVITIES OF DAILY LIVING (ADL)
ADLS_ACUITY_SCORE: 35

## 2022-10-01 NOTE — ED NOTES
ED Triage Provider Note  [facility]  Encounter Date: Oct 1, 2022    History:  Chief Complaint   Patient presents with     Numbness     Geronimo Coats is a 62 year old male who presents to the ED with left arm and leg numbness.  This began yesterday evening around 8:00.  He denies weakness of his extremities, headache, difficulty speaking or visual changes.  He denies neck pain.     Review of Systems:  Denies fevers    Exam:  BP (!) 201/114   Pulse 75   Temp 97.5  F (36.4  C) (Tympanic)   Resp 16   SpO2 99%   General: No acute distress. Appears stated age.   Cardio: Regular rate, extremities well perfused  Resp: Normal work of breathing, grossly normal respiratory rate  Neuro: Alert. CN II-XII grossly intact. Grossly intact strength.  Normal finger-to-nose and heel-to-shin bilaterally.  No aphasia or dysarthria.  He is oriented x3.  He reports diminished sensation left arm and left leg but sensation is normal on face.  Normal rapid alternating movements bilaterally.  Gait is normal.    Medical Decision Making:  Patient arriving to the ED with problem as above. A medical screening exam was performed.  Stroke order set was initiated and I spoke with stroke neurology to obtain stroke consult.  Patient was moved back to a room immediately and will be followed by the COOPER with me.      Raciel Diamond MD on 10/1/2022 at 3:38 PM     Raciel Diamond MD  10/01/22 1542

## 2022-10-01 NOTE — ED PROVIDER NOTES
ED Provider Note  [facility]      History     Chief Complaint   Patient presents with     Numbness     HPI  Geronimo Coats is a 62 year old male with past medical history of chronic hepatitis B, hypertension, thrombocytopenia, GERD and hypertension who presents to the emergency department with complaints of left-sided numbness.  He reports yesterday at approximately 5 PM while he was at work he noticed his left side including his upper extremity and lower extremity became numb.  He reports that this time he is not doing anything strenuous, was not worsened or elicited during activity.  Since then he denies any headaches, blurred vision, double vision, nausea, vomiting    Abdominal pain, or urinary symptoms.  Denies any problems with coordination, or disruptive thought process.    Of note he has been followed of 2 out of his 3 hypertensive meds for the last 4 days.  He did take 1 this morning, but he is unsure which 1 this was.  He does report it was 100 mg, and looking at his med list it appears to either be metoprolol or losartan that he took this morning.    Past Medical History  History reviewed. No pertinent past medical history.  History reviewed. No pertinent surgical history.  amLODIPine (NORVASC) 2.5 MG tablet  aspirin 81 MG EC tablet  losartan (COZAAR) 100 MG tablet  metoprolol succinate ER (TOPROL-XL) 100 MG 24 hr tablet  Multiple Vitamins-Minerals (ONE-A-DAY MENS 50+ PO)      Allergies   Allergen Reactions     Chocolate      Nuts      Orange Fruit [[place]] Cough     Family History  Family History   Problem Relation Age of Onset     Hypertension Sister      Social History   Social History     Tobacco Use     Smoking status: Never Smoker     Smokeless tobacco: Never Used   Vaping Use     Vaping Use: Never used   Substance Use Topics     Alcohol use: Yes     Comment: rarely 2-3 times per year     Drug use: Never      Past medical history, past surgical history, medications,  allergies, family history, and social history were reviewed with the patient. No additional pertinent items.       Review of Systems   Constitutional: Negative for chills and fever.   HENT: Negative for sore throat.    Respiratory: Negative for cough, shortness of breath and stridor.    Cardiovascular: Negative for chest pain, palpitations and leg swelling.   Gastrointestinal: Negative for abdominal pain, diarrhea and nausea.   Genitourinary: Negative for flank pain and frequency.   Musculoskeletal: Negative for back pain.   Neurological: Positive for numbness. Negative for dizziness, tremors, facial asymmetry, light-headedness and headaches.     A complete review of systems was performed with pertinent positives and negatives noted in the HPI, and all other systems negative.    Physical Exam   BP: (!) 201/114  Pulse: 75  Temp: 97.5  F (36.4  C)  Resp: 16  SpO2: 99 %  Physical Exam  Vitals and nursing note reviewed.   Constitutional:       Appearance: Normal appearance.   HENT:      Head: Normocephalic and atraumatic.      Right Ear: External ear normal.      Left Ear: External ear normal.      Nose: Nose normal.      Mouth/Throat:      Mouth: Mucous membranes are moist.   Eyes:      Extraocular Movements: Extraocular movements intact.      Conjunctiva/sclera: Conjunctivae normal.      Pupils: Pupils are equal, round, and reactive to light.   Cardiovascular:      Rate and Rhythm: Normal rate and regular rhythm.      Pulses: Normal pulses.      Heart sounds: Normal heart sounds.   Pulmonary:      Effort: Pulmonary effort is normal.      Breath sounds: Normal breath sounds.   Abdominal:      General: Abdomen is flat. Bowel sounds are normal.      Palpations: Abdomen is soft.      Tenderness: There is no abdominal tenderness.   Musculoskeletal:         General: Normal range of motion.      Cervical back: Normal range of motion and neck supple.   Skin:     General: Skin is warm.   Neurological:      General: No focal  deficit present.      Mental Status: He is alert and oriented to person, place, and time.      GCS: GCS eye subscore is 4. GCS verbal subscore is 5. GCS motor subscore is 6.      Cranial Nerves: Cranial nerves 2-12 are intact.      Sensory: No sensory deficit.      Motor: No weakness, tremor, atrophy, abnormal muscle tone, seizure activity or pronator drift.      Coordination: Romberg sign negative. Coordination normal. Finger-Nose-Finger Test and Heel to Shin Test normal. Rapid alternating movements normal.      Comments: Patient with intact sensation to bilaterally to face and upper arms. Below elbows and lower extremity he reports numbness and sensation difference in left lower arm and lower leg.         ED Course      Procedures            EKG Interpretation:      Interpreted by NIKOLAY Barbosa CNP and reviewed by Dr. Diamond  Time reviewed: 1545  Symptoms at time of EKG: None   Rhythm: normal sinus   Rate: Normal  Axis: Normal  Ectopy: none  Conduction: 1st degree AV block  ST Segments/ T Waves: Non-specific ST-T wave changes  Q Waves: none  Comparison to prior: No longer with T-wave inversion in lead II.  Clinical Impression: non-specific EKG          Results for orders placed or performed during the hospital encounter of 10/01/22   CT Head w/o Contrast     Status: None    Narrative    EXAM: CT HEAD W/O CONTRAST  10/1/2022 4:55 PM     HISTORY:  Decreased sensation left arm and leg, stroke work-up       COMPARISON:  None available.    TECHNIQUE: Using multidetector thin collimation helical acquisition  technique, axial, coronal and sagittal CT images from the skull base  to the vertex were obtained without intravenous contrast.   (topogram) image(s) also obtained and reviewed.    FINDINGS:  No intracranial hemorrhage, mass effect, or midline shift. No acute  loss of gray-white matter differentiation in the cerebral hemispheres.  Ventricles are proportionate to the cerebral sulci. Clear basal  cisterns.  Chronic-appearing infarct of the left centrum semiovale with  extension to the internal capsule. Old lacunar infarct in the left  thalamus. Periventricular and subcortical white matter hypodensities,  which are nonspecific, but likely represent chronic small vessel  ischemic disease.    The bony calvaria and the bones of the skull base are normal. Case  retention cyst in the right maxillary sinus. Grossly normal orbits.       Impression    IMPRESSION:   1. No acute intracranial pathology.  2. Old lacunar infarcts and moderate chronic small vessel ischemic  disease.     I have personally reviewed the examination and initial interpretation  and I agree with the findings.    CRISTY BOLIVAR MD         SYSTEM ID:  [id number]   CTA Head Neck with Contrast     Status: None    Narrative    EXAM: CTA HEAD NECK W CONTRAST  10/1/2022 4:54 PM     HISTORY:  Decreased sensation left arm and leg, stroke work-up       COMPARISON:  None available.    TECHNIQUE:    HEAD and NECK CTA: During rapid bolus intravenous injection of  nonionic contrast material, axial images were obtained using thin  collimation multidetector helical technique from the base of the upper  aortic arch through the [place]. This CT angiogram data was  reconstructed at thin intervals with mild overlap. Images were sent to  the 3D workstation, and 3D reconstructions were obtained. The axial  source images, multiplanar reformations, 3D reconstructions in both  maximum intensity projection display and volume rendered models were  reviewed, with reconstructions performed by the technologist and the  radiologist.    CONTRAST: iopamidol (ISOVUE-370) solution 75 mL    FINDINGS:  Head CTA demonstrates no intracranial arterial aneurysm or stenosis.  Widely patent bilateral posterior communicating arteries. Diminutive  basilar artery due to anterior predominant cerebral circulation.    Neck CTA demonstrates no internal carotid artery stenosis.  Atherosclerotic  plaque of the right carotid bulb without associated  stenosis. Hypoplastic appearance of the left vertebral artery. Minimal  to no contrast is visualized within the V3 segment of the left  vertebral artery with distal reconstitution.    No acute finding in the visualized neck soft tissues, or in the  superior mediastinum/thorax. Multiple subcentimeter nodules of the  left thyroid lobe, requiring no further follow-up by imaging criteria.  Visualized lung apices are clear. No high-grade spinal canal stenosis.  Multilevel degenerative changes of the cervical spine most pronounced  at C4-5.      Impression    IMPRESSION:    1. Head CTA demonstrates no aneurysm or stenosis of the major  intracranial arteries. Anterior predominant cerebral circulation.  2. Neck CTA demonstrates hypoplastic left vertebral and basilar  arteries. Minimal to no contrast opacification seen within the V3  segment of the left vertebral artery with distal reconstitution of the  V4 segment, likely chronic. No carotid artery stenosis.    I have personally reviewed the examination and initial interpretation  and I agree with the findings.    CRISTY BOLIVAR MD         SYSTEM ID:  [id number]   MR Brain w/o Contrast     Status: Abnormal   Result Value Ref Range    Radiologist flags Acute infarct of the right deneen (Urgent)     Narrative    EXAM: MR BRAIN W/O CONTRAST  10/1/2022 6:53 PM     HISTORY:  Eval stroke       COMPARISON:  CT head 10/1/2022.    TECHNIQUE: Sagittal T1-weighted, coronal T2-weighted, axial T2 FLAIR,  axial susceptibility weighted, and axial diffusion-weighted with ADC  map images of the brain were obtained without intravenous contrast    FINDINGS:  There is no mass effect, midline shift, or intracranial hemorrhage.  The ventricles are proportionate to the cerebral sulci. Punctate focus  of diffusion restriction involving the right lower deneen.    Moderate to severe chronic small vessel ischemic disease with  extensive subcortical and  periventricular white matter T2  hyperintensities and numerous punctate foci of old microhemorrhage on  susceptibility weighted imaging. Punctate microhemorrhages are mostly  clustered around the basal ganglia and thalami bilaterally. Multifocal  areas of encephalomalacia secondary to prior infarcts, for example in  the bilateral centrum semiovale, left thalamus, and in the central  upper deneen.    No suspicious abnormality of the skull marrow signal. Mild mucosal  thickening of the maxillary sinuses. Mastoid air cells are clear. No  focal abnormality of the pituitary gland, sella, skull base and upper  cervical spinal structures on sagittal images. The orbits are normal.      Impression    IMPRESSION:  1. Punctate acute infarct in the posterior right deneen.  2. Moderate to severe sequelae of chronic small vessel ischemic  disease and multiple old lacunar infarcts.  3. Numerous chronic microhemorrhages in a distribution suggestive of  hypertensive microangiopathy.    [Urgent Result: Acute infarct of the right deneen]    Finding was identified on 10/1/2022 6:54 PM.     Dr. Diamond was contacted by Dr. Mirza at 10/1/2022 7:08 PM and  verbalized understanding of the urgent finding.     I have personally reviewed the examination and initial interpretation  and I agree with the findings.    CRISTY BOLIVAR MD         SYSTEM ID:  [id number]   Basic metabolic panel     Status: Abnormal   Result Value Ref Range    Sodium 142 136 - 145 mmol/L    Potassium 3.3 (L) 3.4 - 5.3 mmol/L    Chloride 106 98 - 107 mmol/L    Carbon Dioxide (CO2) 28 22 - 29 mmol/L    Anion Gap 8 7 - 15 mmol/L    Urea Nitrogen 17.9 8.0 - 23.0 mg/dL    Creatinine 1.24 (H) 0.67 - 1.17 mg/dL    Calcium 9.5 8.8 - 10.2 mg/dL    Glucose 67 (L) 70 - 99 mg/dL    GFR Estimate 65 >60 mL/min/1.73m2   INR     Status: Normal   Result Value Ref Range    INR 1.13 0.85 - 1.15   Partial thromboplastin time     Status: Normal   Result Value Ref Range    aPTT 28 22 - 38  Seconds   Troponin T, High Sensitivity     Status: Normal   Result Value Ref Range    Troponin T, High Sensitivity 14 <=22 ng/L   CBC with platelets and differential     Status: Abnormal   Result Value Ref Range    WBC Count 4.0 4.0 - 11.0 10e3/uL    RBC Count 5.35 4.40 - 5.90 10e6/uL    Hemoglobin 15.5 13.3 - 17.7 g/dL    Hematocrit 45.5 40.0 - 53.0 %    MCV 85 78 - 100 fL    MCH 29.0 26.5 - 33.0 pg    MCHC 34.1 31.5 - 36.5 g/dL    RDW 13.2 10.0 - 15.0 %    Platelet Count 94 (L) 150 - 450 10e3/uL    % Neutrophils 42 %    % Lymphocytes 46 %    % Monocytes 10 %    % Eosinophils 2 %    % Basophils 0 %    % Immature Granulocytes 0 %    NRBCs per 100 WBC 0 <1 /100    Absolute Neutrophils 1.7 1.6 - 8.3 10e3/uL    Absolute Lymphocytes 1.9 0.8 - 5.3 10e3/uL    Absolute Monocytes 0.4 0.0 - 1.3 10e3/uL    Absolute Eosinophils 0.1 0.0 - 0.7 10e3/uL    Absolute Basophils 0.0 0.0 - 0.2 10e3/uL    Absolute Immature Granulocytes 0.0 <=0.4 10e3/uL    Absolute NRBCs 0.0 10e3/uL   Creatinine POCT     Status: Normal   Result Value Ref Range    Creatinine POCT 1.1 0.7 - 1.3 mg/dL    GFR, ESTIMATED POCT >60 >60 mL/min/1.73m2   Asymptomatic COVID-19 Virus (Coronavirus) by PCR Nasopharyngeal     Status: Normal    Specimen: Nasopharyngeal; Swab   Result Value Ref Range    SARS CoV2 PCR Negative Negative    Narrative    Testing was performed using the Xpert Xpress SARS-CoV-2 Assay on the  Cepheid Gene-Xpert Instrument Systems. Additional information about  this Emergency Use Authorization (EUA) assay can be found via the Lab  Guide. This test should be ordered for the detection of SARS-CoV-2 in  individuals who meet SARS-CoV-2 clinical and/or epidemiological  criteria. Test performance is unknown in asymptomatic patients. This  test is for in vitro diagnostic use under the FDA EUA for  laboratories certified under CLIA to perform high complexity testing.  This test has not been FDA cleared or approved. A negative result  does not rule out  the presence of PCR inhibitors in the specimen or  target RNA in concentration below the limit of detection for the  assay. The possibility of a false negative should be considered if  the patient's recent exposure or clinical presentation suggests  COVID-19. This test was validated by the [facility]. This laboratory is certified under  the Clinical Laboratory Improvement Amendments of 1988 (CLIA-88) as  qualified to perform high complexity laboratory testing.     EKG 12-lead, tracing only     Status: None (Preliminary result)   Result Value Ref Range    Systolic Blood Pressure  mmHg    Diastolic Blood Pressure  mmHg    Ventricular Rate 75 BPM    Atrial Rate 75 BPM    VA Interval 210 ms    QRS Duration 74 ms     ms    QTc 413 ms    P Axis 58 degrees    R AXIS 10 degrees    T Axis 153 degrees    Interpretation ECG       Sinus rhythm with 1st degree A-V block  Voltage criteria for left ventricular hypertrophy  ST & T wave abnormality, consider lateral ischemia  Abnormal ECG     CBC with Platelets & Differential     Status: Abnormal    Narrative    The following orders were created for panel order CBC with Platelets & Differential.  Procedure                               Abnormality         Status                     ---------                               -----------         ------                     CBC with platelets and d...[[id number]]  Abnormal            Final result                 Please view results for these tests on the individual orders.     Medications   lidocaine 1 % 0.1-1 mL (has no administration in time range)   lidocaine (LMX4) cream (has no administration in time range)   sodium chloride (PF) 0.9% PF flush 3 mL (has no administration in time range)   sodium chloride (PF) 0.9% PF flush 3 mL (has no administration in time range)   Medication Instructions - Avoid dextrose in IV solutions. (has no administration in time range)   medication  instruction - No oral meds if patient didn't pass dysphagia screen (has no administration in time range)   labetalol (NORMODYNE/TRANDATE) injection 10-20 mg (has no administration in time range)     Or   hydrALAZINE (APRESOLINE) injection 10-20 mg (has no administration in time range)   aspirin EC tablet 81 mg (has no administration in time range)     Or   aspirin (ASA) chewable tablet 81 mg (has no administration in time range)   clopidogrel (PLAVIX) tablet 300 mg (has no administration in time range)     Followed by   clopidogrel (PLAVIX) tablet 75 mg (has no administration in time range)   acetaminophen (TYLENOL) tablet 650 mg (has no administration in time range)   polyethylene glycol (MIRALAX) Packet 17 g (has no administration in time range)   senna-docusate (SENOKOT-S/PERICOLACE) 8.6-50 MG per tablet 1 tablet (has no administration in time range)   atorvastatin (LIPITOR) tablet 40 mg (has no administration in time range)   losartan (COZAAR) tablet 100 mg (has no administration in time range)   metoprolol succinate ER (TOPROL XL) 24 hr tablet 100 mg (has no administration in time range)   amLODIPine (NORVASC) tablet 2.5 mg (has no administration in time range)   iopamidol (ISOVUE-370) solution 75 mL (75 mLs Intravenous Given 10/1/22 1638)   sodium chloride (PF) 0.9% PF flush 90 mL (90 mLs Intravenous Given 10/1/22 1638)        Assessments & Plan (with Medical Decision Making)   62 year old male w/ PMH notable for chronic hepatitis B, hypertension, thrombocytopenia, GERD and hypertension      Vital signs positive for hypertension. Otherwise on examination, patient with intact strength bilaterally without aphasia or change in vision.  Normal finger-to-nose and heel-to-shin bilaterally.  He reports diminished sensation to lower left arm and left leg with sensation intact to face. Normal rapid alternating movements bilaterally, heal-to-shin intact, gait is normal.    Ddx includes, but not limited to, hypertension  emergency, stroke.     Patient initially seen by triage physician, who initiated a CBC, BMP, INR, PTT, troponin, EKG, and CTA head and neck from stroke work up.  Stat creatinine was completed patient's creatinine 1.1 with an estimated GFR greater than 60. With patient's symptoms starting yesterday and he currently being outside intervention window a stroke team was not activated, but stroke neurology team was contacted and saw and assessed patient.     Patient's CTA head/neck and CT head was read as old lacunar infarcts and moderate chronic small vessel ischemic disease. Per neurology recommendation also obtained brain MRI.    MRI results showed acute infarct in the posterior right deneen, moderate to severe sequelae of chronic small vessel ischemic disease and multiple old lacunar infarcts, and numerous chronic microhemorrhages in a distribution suggestive of hypertensive microangiopathy.  Following notification by radiology of this result stroke team was notified and updated. At this time per their request they will place orders for hypertension management.    Patient will be admitted to their service for further evaluation, and management. No additional requests/recommendations for workup in the ED at this time.    I reviewed the available findings from his CT and MRI and that he had a suspected stroke and will be admitted to stroke neurology team.     --    ED Attending Physician Attestation    I Raciel Diamond MD, cared for this patient with the Advanced Practice Provider (COOPER). I have performed a history and physical examination of the patient independent of the COOPER. I reviewed the COOPER's documentation above and agree with the documented findings and plan of care. I personally provided a substantive portion of the care for this patient.    I personally performed the substantive portion of the medical decision making for this visit - please see the COOPER's documentatio and my separate note for full  details.        Summary of HPI, PE, ED Course   Patient is a 62 year old male evaluated in the emergency department for left arm and leg numbness. Exam notable for subjective diminished sensation of left arm and leg, no other significant neurologic deficits noted. ED course notable for stroke consult was placed, CT/CTA demonstrated evidence of old infarcts, MRI is ordered and demonstrates evidence of acute infarct.  Patient will be admitted to neurology.  Blood pressure was elevated, but this was not aggressively managed allowing for permissive hypertension. After the completion of care in the emergency department, the patient was admitted to inpatient.          Raciel Diamond MD  Emergency Medicine         I have reviewed the nursing notes. I have reviewed the findings, diagnosis, plan and need for follow up with the patient.    New Prescriptions    No medications on file       Final diagnoses:   Right pontine stroke (H)   Hypertensive urgency       --  NIKOLAY Barbosa CNP  [facility] EMERGENCY DEPARTMENT  10/1/2022     Adelfo Mcfadden APRN CNP  10/01/22 2012       Raciel Diamond MD  10/03/22 [id number]

## 2022-10-01 NOTE — TELEPHONE ENCOUNTER
"    Reason for Disposition    [1] Numbness (i.e., loss of sensation) of the face, arm / hand, or leg / foot on one side of the body AND [2] sudden onset AND [3] present now    Additional Information    Negative: [1] Weakness (i.e., paralysis, loss of muscle strength) of the face, arm / hand, or leg / foot on one side of the body AND [2] sudden onset AND [3] present now (Exception: Bell's palsy suspected [i.e., weakness only on one side of the face, developing over hours to days, no other symptoms])    Negative: [1] SEVERE weakness (i.e., unable to walk or barely able to walk, requires support) AND [2] new-onset or worsening    Answer Assessment - Initial Assessment Questions  1. SYMPTOM: \"What is the main symptom you are concerned about?\" (e.g., weakness, numbness)      Numbness on left side of body  2. ONSET: \"When did this start?\" (minutes, hours, days; while sleeping)      startned last night and is getting worse  3. LAST NORMAL: \"When was the last time you (the patient) were normal (no symptoms)?\"      yesterday  4. PATTERN \"Does this come and go, or has it been constant since it started?\"  \"Is it present now?\"      constant  5. CARDIAC SYMPTOMS: \"Have you had any of the following symptoms: chest pain, difficulty breathing, palpitations?\"      denies  6. NEUROLOGIC SYMPTOMS: \"Have you had any of the following symptoms: headache, dizziness, vision loss, double vision, changes in speech, unsteady on your feet?\"      A little bit, left eye watering, dizziness  7. OTHER SYMPTOMS: \"Do you have any other symptoms?\"      no  8. PREGNANCY: \"Is there any chance you are pregnant?\" \"When was your last menstrual period?\"      no    Protocols used: NEUROLOGIC DEFICIT-A-AH      "

## 2022-10-01 NOTE — CONSULTS
[facility]    Stroke Consult Note    Reason for Consult: Stroke     Chief Complaint: Numbness      HPI  Geronimo Coats is a 62 year old male past medical history of medication refractory hypertension as well as previous stroke in June.  The patient presents with left hemibody weakness since 8 PM last night.  He says he was in his otherwise normal state of health and was at work when the symptoms began.  They were acute in onset and are still present here in the ED.  In discussion with the patient he is not entirely sure what medications he takes every day although according to the chart he takes 3 antihypertensive medications.  He was last seen by his primary care physician in June who noted that he had recently had multifocal embolic infarcts when he presented to regions of hypertension and dizziness.  At that time he was started on aspirin 81 mg.  PCP at that time was planning to refer the patient to neurology and he was planned to be seen in November.  NIH stroke scale 1 for left-sided numbness.  Given latency from symptom onset patient was not candidate for acute intervention of any kind.    The patient denies symptoms associated with stroke mimics.  Denies convulsions, urinary incontinence, loss of consciousness.  He also denies headache with migrainous features.    Imaging Findings  CT consistent with bilateral lacunes in the basal ganglia and corona radiata.  Unclear acuity.    Intravenous Thrombolysis  Not given due to:   - unclear or unfavorable risk-benefit profile for extended window thrombolysis beyond the conventional 4.5 hour time window    Endovascular Treatment  Not initiated due to absence of proximal vessel occlusion    Impression   62 year old male past medical history of medication refractory hypertension as well as previous stroke who presents with left-sided numbness, NIHSS 1, all concerning for right-sided subcortical infarct.  Head CT and  CTA were both equivocal for early ischemic changes or clearly subacute right subcortical infarct.  We will plan for brain MRI better delineate structural normality.    Given the patient's acute onset of left-sided numbness suspicion for stroke is high.  Should MRI confirmed stroke, I would be concerned about a cardioembolic etiology given his recent multifocal infarcts and presentation with embolic encephalopathy at [facility] in June.  Small vessel disease will also be a consideration.  Stroke work-up will be needed.    Recommendations  -Brain MRI stroke series    Patient Follow-up     - final recommendation pending work-up    Thank you for this consult. We will continue to follow.      The patient was discussed with Stroke Staff Dr. Kings Arizmendi MD  Neurology Resident  ______________________________________________________    Clinically Significant Risk Factors Present on Admission               # Thrombocytopenia: Plts = 94 10e3/uL (Ref range: 150 - 450 10e3/uL) on admission, will monitor for bleeding          Past Medical History   History reviewed. No pertinent past medical history.  Past Surgical History   History reviewed. No pertinent surgical history.  Medications   Home Meds  Prior to Admission medications    Medication Sig Start Date End Date Taking? Authorizing Provider   amLODIPine (NORVASC) 2.5 MG tablet Take 1 tablet (2.5 mg) by mouth daily 6/21/22   Diana Tripathi APRN CNP   aspirin 81 MG EC tablet Take 81 mg by mouth every 6 hours as needed for moderate pain    Reported, Patient   losartan (COZAAR) 100 MG tablet Take 1 tablet (100 mg) by mouth daily 4/8/22   Diaan Tripathi APRN CNP   metoprolol succinate ER (TOPROL-XL) 100 MG 24 hr tablet Take 1 tablet (100 mg) by mouth daily 3/17/22   Lisa Maldonado NP       Scheduled Meds      Infusion Meds    sodium chloride         PRN Meds  sodium chloride    Allergies   Allergies   Allergen Reactions      Chocolate      Nuts      Orange Fruit [[place]] Cough     Family History   Family History   Problem Relation Age of Onset     Hypertension Sister      Social History   Social History     Tobacco Use     Smoking status: Never Smoker     Smokeless tobacco: Never Used   Vaping Use     Vaping Use: Never used   Substance Use Topics     Alcohol use: Yes     Comment: rarely 2-3 times per year     Drug use: Never       Review of Systems   The 10 point Review of Systems is negative other than noted in the HPI or here.        PHYSICAL EXAMINATION  Temp:  [97.5  F (36.4  C)] 97.5  F (36.4  C)  Pulse:  [75-78] 78  Resp:  [16-18] 18  BP: (191-201)/(111-114) 191/111  SpO2:  [99 %-100 %] 100 %     General Exam  General:  patient lying in bed without any acute distress    HEENT:  normocephalic/atraumatic  Cardio:  RRR  Pulmonary:  no respiratory distress  Abdomen:  soft  Extremities:  no edema  Skin:  intact     Neuro Exam  Mental Status:  alert, oriented x 3, follows commands, speech clear and fluent, naming and repetition normal  Cranial Nerves:  visual fields intact, PERRL, EOMI with normal smooth pursuit, facial sensation intact and symmetric, facial movements symmetric, hearing not formally tested but intact to conversation, palate elevation symmetric and uvula midline, no dysarthria, shoulder shrug strong bilaterally, tongue protrusion midline  Motor:  normal muscle tone and bulk, no abnormal movements, able to move all limbs spontaneously, strength 5/5 throughout upper and lower extremities, no pronator drift  Reflexes:  toes down-going  Sensory: Loss sensation to light touch throughout the left hemibody not including the face.  Patient estimates 15 to 20% loss in sensation light touch.  Coordination:  normal finger-to-nose and heel-to-shin bilaterally without dysmetria, rapid alternating movements symmetric  Station/Gait:  deferred    Dysphagia Screen  Passed screening, no dysarthria - Regular Diet with thin liquids   10/01/2022     Stroke Scales    NIHSS  1a. Level of Consciousness 0-->Alert, keenly responsive   1b. LOC Questions 0-->Answers both questions correctly   1c. LOC Commands 0-->Performs both tasks correctly   2.   Best Gaze 0-->Normal   3.   Visual 0-->No visual loss   4.   Facial Palsy 0-->Normal symmetrical movements   5a. Motor Arm, Left 0-->No drift, limb holds 90 (or 45) degrees for full 10 secs   5b. Motor Arm, Right 0-->No drift, limb holds 90 (or 45) degrees for full 10 secs   6a. Motor Leg, Left 0-->No drift, leg holds 30 degree position for full 5 secs   6b. Motor Leg, right 0-->No drift, leg holds 30 degree position for full 5 secs   7.   Limb Ataxia 0-->Absent   8.   Sensory 1-->Mild-to-moderate sensory loss, patient feels pinprick is less sharp or is dull on the affected side, or there is a loss of superficial pain with pinprick, but patient is aware of being touched   9.   Best Language 0-->No aphasia, normal   10. Dysarthria 0-->Normal   11. Extinction and Inattention  0-->No abnormality   Total 1 (10/01/22 1604)       Modified [place] Score (Pre-morbid)    -      Imaging  I personally reviewed all imaging; relevant findings per HPI.     Lab Results Data   CBC  Recent Labs   Lab 10/01/22  1539   WBC 4.0   RBC 5.35   HGB 15.5   HCT 45.5   PLT 94*     Basic Metabolic Panel    Recent Labs   Lab 10/01/22  1559 10/01/22  1539   NA  --  142   POTASSIUM  --  3.3*   CHLORIDE  --  106   CO2  --  28   BUN  --  17.9   CR 1.1 1.24*   GLC  --  67*   EUGENIO  --  9.5     Liver Panel  No results for input(s): PROTTOTAL, ALBUMIN, BILITOTAL, ALKPHOS, AST, ALT, BILIDIRECT in the last 168 hours.  INR    Recent Labs   Lab Test 10/01/22  1539   INR 1.13      Lipid Profile    Recent Labs   Lab Test 05/21/19  1039   CHOL 146   HDL 48   LDL 76   TRIG 112     A1C  No lab results found.  Troponin    Recent Labs   Lab 10/01/22  1539   CTROPT 14

## 2022-10-01 NOTE — ED TRIAGE NOTES
Patient BIBA from home with concerns of left sided numbness since last evening. Patient denies weakness or other associated symptoms. Patient also reports high BP (210/114 in triage.) Patient has been off of home BP meds for several days due to running out of Rx.      Triage Assessment     Row Name 10/01/22 1530       Triage Assessment (Adult)    Airway WDL WDL       Respiratory WDL    Respiratory WDL WDL       Skin Circulation/Temperature WDL    Skin Circulation/Temperature WDL WDL       Cardiac WDL    Cardiac WDL WDL       Peripheral/Neurovascular WDL    Peripheral Neurovascular WDL X  left sided numbness       Cognitive/Neuro/Behavioral WDL    Cognitive/Neuro/Behavioral WDL WDL    Row Name 10/01/22 1528       Triage Assessment (Adult)    Airway WDL WDL       Respiratory WDL    Respiratory WDL WDL       Skin Circulation/Temperature WDL    Skin Circulation/Temperature WDL WDL       Cardiac WDL    Cardiac WDL WDL       Peripheral/Neurovascular WDL    Peripheral Neurovascular WDL WDL       Cognitive/Neuro/Behavioral WDL    Cognitive/Neuro/Behavioral WDL WDL

## 2022-10-02 ENCOUNTER — APPOINTMENT (OUTPATIENT)
Dept: CARDIOLOGY | Facility: CLINIC | Age: 62
DRG: 065 | End: 2022-10-02
Attending: STUDENT IN AN ORGANIZED HEALTH CARE EDUCATION/TRAINING PROGRAM
Payer: COMMERCIAL

## 2022-10-02 LAB
ANION GAP SERPL CALCULATED.3IONS-SCNC: 7 MMOL/L (ref 7–15)
ATRIAL RATE - MUSE: 75 BPM
BI-PLANE LVEF ECHO: NORMAL
BUN SERPL-MCNC: 15.8 MG/DL (ref 8–23)
CALCIUM SERPL-MCNC: 8.9 MG/DL (ref 8.8–10.2)
CHLORIDE SERPL-SCNC: 107 MMOL/L (ref 98–107)
CHOLEST SERPL-MCNC: 114 MG/DL
CREAT SERPL-MCNC: 1.24 MG/DL (ref 0.67–1.17)
DEPRECATED HCO3 PLAS-SCNC: 29 MMOL/L (ref 22–29)
DIASTOLIC BLOOD PRESSURE - MUSE: NORMAL MMHG
ERYTHROCYTE [DISTWIDTH] IN BLOOD BY AUTOMATED COUNT: 13.3 % (ref 10–15)
GFR SERPL CREATININE-BSD FRML MDRD: 65 ML/MIN/1.73M2
GLUCOSE BLDC GLUCOMTR-MCNC: 117 MG/DL (ref 70–99)
GLUCOSE BLDC GLUCOMTR-MCNC: 94 MG/DL (ref 70–99)
GLUCOSE SERPL-MCNC: 82 MG/DL (ref 70–99)
HBA1C MFR BLD: 5.3 %
HCT VFR BLD AUTO: 42.6 % (ref 40–53)
HDLC SERPL-MCNC: 42 MG/DL
HGB BLD-MCNC: 14.3 G/DL (ref 13.3–17.7)
INTERPRETATION ECG - MUSE: NORMAL
LACTATE SERPL-SCNC: 1.3 MMOL/L (ref 0.7–2)
LDLC SERPL CALC-MCNC: 61 MG/DL
LVEF ECHO: NORMAL
MAGNESIUM SERPL-MCNC: 2 MG/DL (ref 1.7–2.3)
MAGNESIUM SERPL-MCNC: 2 MG/DL (ref 1.7–2.3)
MCH RBC QN AUTO: 28.6 PG (ref 26.5–33)
MCHC RBC AUTO-ENTMCNC: 33.6 G/DL (ref 31.5–36.5)
MCV RBC AUTO: 85 FL (ref 78–100)
NONHDLC SERPL-MCNC: 72 MG/DL
P AXIS - MUSE: 58 DEGREES
PHOSPHATE SERPL-MCNC: 2 MG/DL (ref 2.5–4.5)
PHOSPHATE SERPL-MCNC: 3.6 MG/DL (ref 2.5–4.5)
PLATELET # BLD AUTO: 90 10E3/UL (ref 150–450)
POTASSIUM SERPL-SCNC: 2.9 MMOL/L (ref 3.4–5.3)
POTASSIUM SERPL-SCNC: 3.2 MMOL/L (ref 3.4–5.3)
POTASSIUM SERPL-SCNC: 3.3 MMOL/L (ref 3.4–5.3)
PR INTERVAL - MUSE: 210 MS
QRS DURATION - MUSE: 74 MS
QT - MUSE: 370 MS
QTC - MUSE: 413 MS
R AXIS - MUSE: 10 DEGREES
RBC # BLD AUTO: 5 10E6/UL (ref 4.4–5.9)
SODIUM SERPL-SCNC: 143 MMOL/L (ref 136–145)
SYSTOLIC BLOOD PRESSURE - MUSE: NORMAL MMHG
T AXIS - MUSE: 153 DEGREES
TRIGL SERPL-MCNC: 53 MG/DL
VENTRICULAR RATE- MUSE: 75 BPM
WBC # BLD AUTO: 3.2 10E3/UL (ref 4–11)

## 2022-10-02 PROCEDURE — 83605 ASSAY OF LACTIC ACID: CPT | Performed by: STUDENT IN AN ORGANIZED HEALTH CARE EDUCATION/TRAINING PROGRAM

## 2022-10-02 PROCEDURE — 93306 TTE W/DOPPLER COMPLETE: CPT | Mod: 26 | Performed by: INTERNAL MEDICINE

## 2022-10-02 PROCEDURE — 36415 COLL VENOUS BLD VENIPUNCTURE: CPT | Performed by: STUDENT IN AN ORGANIZED HEALTH CARE EDUCATION/TRAINING PROGRAM

## 2022-10-02 PROCEDURE — 255N000002 HC RX 255 OP 636: Performed by: INTERNAL MEDICINE

## 2022-10-02 PROCEDURE — 999N000208 ECHOCARDIOGRAM COMPLETE

## 2022-10-02 PROCEDURE — 80048 BASIC METABOLIC PNL TOTAL CA: CPT | Performed by: STUDENT IN AN ORGANIZED HEALTH CARE EDUCATION/TRAINING PROGRAM

## 2022-10-02 PROCEDURE — 84132 ASSAY OF SERUM POTASSIUM: CPT | Performed by: STUDENT IN AN ORGANIZED HEALTH CARE EDUCATION/TRAINING PROGRAM

## 2022-10-02 PROCEDURE — 85027 COMPLETE CBC AUTOMATED: CPT | Performed by: STUDENT IN AN ORGANIZED HEALTH CARE EDUCATION/TRAINING PROGRAM

## 2022-10-02 PROCEDURE — 96376 TX/PRO/DX INJ SAME DRUG ADON: CPT | Performed by: NURSE PRACTITIONER

## 2022-10-02 PROCEDURE — 83735 ASSAY OF MAGNESIUM: CPT | Performed by: STUDENT IN AN ORGANIZED HEALTH CARE EDUCATION/TRAINING PROGRAM

## 2022-10-02 PROCEDURE — 80061 LIPID PANEL: CPT | Performed by: STUDENT IN AN ORGANIZED HEALTH CARE EDUCATION/TRAINING PROGRAM

## 2022-10-02 PROCEDURE — 250N000013 HC RX MED GY IP 250 OP 250 PS 637: Performed by: STUDENT IN AN ORGANIZED HEALTH CARE EDUCATION/TRAINING PROGRAM

## 2022-10-02 PROCEDURE — 83036 HEMOGLOBIN GLYCOSYLATED A1C: CPT | Performed by: STUDENT IN AN ORGANIZED HEALTH CARE EDUCATION/TRAINING PROGRAM

## 2022-10-02 PROCEDURE — 84100 ASSAY OF PHOSPHORUS: CPT | Performed by: STUDENT IN AN ORGANIZED HEALTH CARE EDUCATION/TRAINING PROGRAM

## 2022-10-02 PROCEDURE — 250N000013 HC RX MED GY IP 250 OP 250 PS 637

## 2022-10-02 PROCEDURE — 250N000011 HC RX IP 250 OP 636: Performed by: STUDENT IN AN ORGANIZED HEALTH CARE EDUCATION/TRAINING PROGRAM

## 2022-10-02 PROCEDURE — 120N000002 HC R&B MED SURG/OB UMMC

## 2022-10-02 RX ORDER — POTASSIUM CHLORIDE 750 MG/1
40 TABLET, EXTENDED RELEASE ORAL ONCE
Status: COMPLETED | OUTPATIENT
Start: 2022-10-02 | End: 2022-10-02

## 2022-10-02 RX ORDER — AMLODIPINE BESYLATE 10 MG/1
10 TABLET ORAL DAILY
Status: DISCONTINUED | OUTPATIENT
Start: 2022-10-02 | End: 2022-10-02

## 2022-10-02 RX ORDER — CARVEDILOL 6.25 MG/1
6.25 TABLET ORAL 2 TIMES DAILY WITH MEALS
Status: DISCONTINUED | OUTPATIENT
Start: 2022-10-03 | End: 2022-10-03

## 2022-10-02 RX ORDER — AMLODIPINE BESYLATE 10 MG/1
10 TABLET ORAL DAILY
Status: DISCONTINUED | OUTPATIENT
Start: 2022-10-03 | End: 2022-10-05 | Stop reason: HOSPADM

## 2022-10-02 RX ORDER — ATORVASTATIN CALCIUM 20 MG/1
20 TABLET, FILM COATED ORAL EVERY EVENING
Status: DISCONTINUED | OUTPATIENT
Start: 2022-10-02 | End: 2022-10-05 | Stop reason: HOSPADM

## 2022-10-02 RX ADMIN — AMLODIPINE BESYLATE 7.5 MG: 5 TABLET ORAL at 15:40

## 2022-10-02 RX ADMIN — Medication 10 MG: at 21:19

## 2022-10-02 RX ADMIN — POTASSIUM CHLORIDE 40 MEQ: 750 TABLET, EXTENDED RELEASE ORAL at 14:40

## 2022-10-02 RX ADMIN — ATORVASTATIN CALCIUM 20 MG: 20 TABLET, FILM COATED ORAL at 19:24

## 2022-10-02 RX ADMIN — ASPIRIN 81 MG: 81 TABLET, COATED ORAL at 09:06

## 2022-10-02 RX ADMIN — LABETALOL HYDROCHLORIDE 10 MG: 5 INJECTION, SOLUTION INTRAVENOUS at 19:25

## 2022-10-02 RX ADMIN — LABETALOL HYDROCHLORIDE 10 MG: 5 INJECTION, SOLUTION INTRAVENOUS at 11:22

## 2022-10-02 RX ADMIN — METOPROLOL SUCCINATE 100 MG: 50 TABLET, EXTENDED RELEASE ORAL at 09:06

## 2022-10-02 RX ADMIN — AMLODIPINE BESYLATE 2.5 MG: 2.5 TABLET ORAL at 09:06

## 2022-10-02 RX ADMIN — CLOPIDOGREL BISULFATE 75 MG: 75 TABLET ORAL at 09:06

## 2022-10-02 RX ADMIN — LOSARTAN POTASSIUM 100 MG: 100 TABLET, FILM COATED ORAL at 09:06

## 2022-10-02 RX ADMIN — POTASSIUM CHLORIDE 40 MEQ: 750 TABLET, EXTENDED RELEASE ORAL at 21:19

## 2022-10-02 RX ADMIN — HUMAN ALBUMIN MICROSPHERES AND PERFLUTREN 3 ML: 10; .22 INJECTION, SOLUTION INTRAVENOUS at 08:22

## 2022-10-02 ASSESSMENT — ACTIVITIES OF DAILY LIVING (ADL)
ADLS_ACUITY_SCORE: 18
WEAR_GLASSES_OR_BLIND: NO
DRESSING/BATHING_DIFFICULTY: NO
WALKING_OR_CLIMBING_STAIRS_DIFFICULTY: NO
DIFFICULTY_EATING/SWALLOWING: NO
ADLS_ACUITY_SCORE: 35
ADLS_ACUITY_SCORE: 18
CONCENTRATING,_REMEMBERING_OR_MAKING_DECISIONS_DIFFICULTY: NO
CHANGE_IN_FUNCTIONAL_STATUS_SINCE_ONSET_OF_CURRENT_ILLNESS/INJURY: NO
ADLS_ACUITY_SCORE: 35
ADLS_ACUITY_SCORE: 18
ADLS_ACUITY_SCORE: 35
ADLS_ACUITY_SCORE: 18
ADLS_ACUITY_SCORE: 35
ADLS_ACUITY_SCORE: 18
ADLS_ACUITY_SCORE: 35
ADLS_ACUITY_SCORE: 35
TOILETING_ISSUES: NO
DOING_ERRANDS_INDEPENDENTLY_DIFFICULTY: NO
FALL_HISTORY_WITHIN_LAST_SIX_MONTHS: NO
ADLS_ACUITY_SCORE: 18

## 2022-10-02 NOTE — PLAN OF CARE
Occupational Therapy: Orders received. Chart reviewed and discussed with care team.? Occupational Therapy not indicated due to IND with ADLs and mobility.? Will complete orders.

## 2022-10-02 NOTE — CARE PLAN
Speech Language Pathology: Orders received. Chart reviewed and discussed with care team.? Speech Language Pathology not indicated due to pt passing nursing swallow screen and tolerating regular/thin liquid diet.? Defer discharge recommendations to MD.? Will complete orders.

## 2022-10-02 NOTE — PLAN OF CARE
Arrived from: ED 1215  Belongings/meds: cellphone, shoes, wallet, clothings  2 RN Skin Assessment Completed by: Denae FOLEY RN & Radha LOPEZ RN  Non-intact findings documented (yes/no/NA): intact

## 2022-10-02 NOTE — PLAN OF CARE
ED PT Deferral: Patient met in the ED and demonstrated IND for all functional mobility and transfers. Discussed role of acute PT/OT and patient declined rehab needs. Will complete orders at this time. MD update via paging system.

## 2022-10-02 NOTE — PROGRESS NOTES
[facility]    Stroke Progress Note    Interval EventsNAOE.  Patient reports complete resolution of L hemibody numbness, but with ongoing mild dysarthria, mild dysdiadochokinesia on L.  Refractory HTN persists in 150s, 160s.      #Major management changes / active issues today  -Switching  mg metoprolol ER to carvedilol 6.25 BID for concomitant BP effects  -Decreasing atorvastatin from 40 to 20 mg given LDL 61  -Increasing amlodipine to 10 mg daily  -PT and OT consults  -Recommending outpatient follow-up of refractory HTN, c/f possible renovascular component    HPI Summary  Geronimo Coats is a 62 year old male w/ PMHx refractory HTN, chronic Hep B, and multifocal bilateral infarcts who presented on 10/1/2022 with L hemibody numbness, concerning for stroke     The pt reports that he had been in his normal state of health and was at work the night before (9/30) when he developed sudden onset of an abnormal, numbing sensation in his L arm and leg. However, the patient did not decide to come into the ED until the next day when the symptoms persisted. On arrival in the ED, the pt was afebrile but markedly hypertensive (/114), having not taken his PTA blood pressure meds that day. His numbness was still present, but the remainder of his exam was unremarkable, giving him an NIHSS 1. 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 Given his LKW, the pt was not a TNK candidate and no LVO was noted to indicated thrombectomy. As it was somewhat unclear whether the pt's current symptoms were the result of an acute stroke vs hypertensive emergency, the pt then underwent an MRI Brain, which returned positive for an acute punctate infarct in the posterior R deneen. Multiple chronic bilateral microhemorrhages were also present suggestive of a hypertensive microangiopathy. By this time, the pt  reported that his numbness had largely improved, though some slight asymmetry in sensation was still present, and he was admitted for further stroke work-up     Of note, the pt has medication refractory HTN and is currently on 3 BP meds (Norvasc, Losartan, and Metoprolol per chart review though the pt himself is unsure which medications he is taking). In addition to this, the pt had presented to [facility]'s ED on 6/2/2022 complaining of generalized weakness and dizziness. At that time, his symptoms were thought to be due to elevated BP, but CTH at that time did reveal numerous chronic punctate strokes in the R putamen, L thalamus, bilateral frontoparietal white matter, and the L parietal white matter thought to be embolic in origin. For this, the pt was started on ASA 81mg daily and he was scheduled to see Neurology in Nov 2022    Stroke Evaluation Summarized    MRI/Head CT 1. Punctate acute infarct in the posterior right deneen.  2. Moderate to severe sequelae of chronic small vessel ischemic  disease and multiple old lacunar infarcts.  3. Numerous chronic microhemorrhages in a distribution suggestive of  hypertensive microangiopathy.   Intracranial and Cervical Vasculature 1. Head CTA demonstrates no aneurysm or stenosis of the major intracranial arteries. Anterior predominant cerebral circulation.  2. Neck CTA demonstrates hypoplastic left vertebral and basilar arteries. Minimal to no contrast opacification seen within the V3 segment of the left vertebral artery with distal reconstitution of the V4 segment, likely chronic. No carotid artery stenosis.         Echocardiogram Left ventricular function is borderline. Biplane LVEF is 56%. No regional wall  motion abnormalities. Mild concentric LVH.  Global right ventricular function is normal.  No significant valve abnormalities.  The inferior vena cava is normal.   EKG/Telemetry No Afib.  Consistent with LVH. Possible lateral ischemia   Other Testing Troponin WNL     LDL   10/2/2022: 61 mg/dL   A1C  10/2/2022: 5.3 %   Troponin 10/1/2022: 14 ng/L       Impression   62 M PMHx of refractory HTN, chronic HepB, multifocal bilateral micro-hemorrhages p/w R-hemibody numbness c/f stroke w/ punctate infarct of R posterior thomas on MR felt to be 2/2 microvascular angiopathy vs less likely ESUS.    Plan  #Acute ischemic stroke of the Right Posterior Thomas due to small vessel disease vs (less likely) ESUS  #Bilateral Multifocal Chronic Punctate Infarcts  #Bilateral Multifocal Chronic Microhemorrhages suggestive of hypertensive microangiopathy  Pt presented w/ 1d L hemibody numbness, subsequently found to have an acute R posterior pontine stroke.  Location of pt's stroke is c/w small vessel disease, for which the pt has notable risk factors: severe, refractory HTN and questionable GARTH (snores heavily per wife).  However, his multifocal bilateral strokes also raise the potential of ESUS, although that is felt to be less likely given location of prior micro hemorrhages / punctate infarcts in regions classically associated with small vessel disease, in c/o the risk factors for microangiopathy mentioned above.   - Admit to Neurology  - Neurochecks Q4h  - As pt's LKW is greater than 24hrs prior, permissive HTN is no longer indicated              - PTA Norvasc 2.5mg daily              - PTA Losartan 100mg daily              - START Coreg 6.25 BID, STOP PTA Toprol XL 100mg daily              - PRN Hydralazine/Labetalol for SBP > 180 or DBP > 105  - Avoid hypotonic IV fluids  - Continue ASA 81mg daily (loaded at presentation)  - Continue Plavix 75mg daily (loaded at presentation)  - Decrease Atorvastatin to 20 mg               - LDL 61  - TTE w/ mild concentric hypertrophy (c/w longstanding HTN), no valvular or atrial abnormalities  - Telemetry, EKG  - Bedside Glucose Monitoring  - A1c 5.3, troponin WNL   - PT/OT/SLP  - PM&R  - Stroke Education  - Stroke Class per [facility] ([facility])  - Depression  Screen  - Apnea Screen  - Euthermia, Euglycemia  - On discharge, may consider              - 30d Cardiac Monitoring              - Sleep Medicine referral     #Medication Refractory HTN  #Concern for renovascular hypertension  #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  Low suspicion for medication non-compliance outside of context of refill problems -- patient reports he is complaint with medications, and BP is generally >150 even when stably on all prescribed antihypertensives.  Renal US was performed in 5/2015 for microscopic hematuria, but no comment on renal vasculature.  Initial chart review reveals repeated up-titration of antihypertensives, but no evidence of significant work-up for secondary hypertension etiologies.   - PTA Norvasc 2.5mg daily  - PTA Losartan 100mg daily  - START Coreg 6.25 BID, STOP PTA Toprol XL 100mg daily  - INCREASE Amlodipine to 10 mg daily  - PRN Hydralazine/Labetalol for SBP > 180 or DBP > 105  - Consider repeat Renal US and/or renin, aldosterone, renin:aldosterone ratio testing   *Discussed with staff physician.  Not generally done as inpatient.  Will recommend close follow-up as outpatient.       #Chronic Hepatitis B  Pt is currently being followed by Dr Leventhal in the GI clinic for his chronic hep b, last seen 12/7/2020. Was noted to have some degree of hepatic fibrosis and portal hypertension, with recommendation to undergo Hep D testing and an Ab US w/ elastography before following up in 3mo. From his records, I see no records from any of these tests and has not followed-up as directed. No treatment has yet been recommended  - Have pt follow-up with GI as previously recommended              - Hep D testing outpatient               - Ab US w/ elastography     Diet: Regular Diet w/ thin liquid  VTE PPx: SCDs, ambulatory  Code Status: FULL per discussion w/ patient on 10/1/2022. Also designated his wife, Erick Rudolph, as his  medical decision maker should this be needed  Dispo: pending further work-up    Patient Follow-up    - final recommendation pending work-up    We will admit this patient to the Stroke Service.    Patient discussed with my supervising physician, Dr. Ku, who agrees with the critical findings, assessment, and plan as documented in the note above, or as otherwise noted in their attestation or staff note.      Manjit Kaufman MD, MS  PGY-1, Neurology      ______________________________________________________    Clinically Significant Risk Factors Present on Admission               # Thrombocytopenia: Plts = 90 10e3/uL (Ref range: 150 - 450 10e3/uL) on admission, will monitor for bleeding          Medications   Scheduled Meds    [START ON 10/3/2022] amLODIPine  10 mg Oral Daily     aspirin  81 mg Oral Daily    Or     aspirin  81 mg Oral or NG Tube Daily     atorvastatin  20 mg Oral QPM     [START ON 10/3/2022] carvedilol  6.25 mg Oral BID w/meals     clopidogrel  75 mg Oral or NG Tube Daily     losartan  100 mg Oral Daily     sodium chloride (PF)  3 mL Intracatheter Q8H       Infusion Meds    - MEDICATION INSTRUCTIONS -       - MEDICATION INSTRUCTIONS -         PRN Meds  acetaminophen, labetalol **OR** hydrALAZINE, lidocaine 4%, lidocaine (buffered or not buffered), - MEDICATION INSTRUCTIONS -, - MEDICATION INSTRUCTIONS -, polyethylene glycol, senna-docusate, sodium chloride (PF)       PHYSICAL EXAMINATION  Temp:  [47.1  F (8.4  C)-98.7  F (37.1  C)] 47.1  F (8.4  C)  Pulse:  [60-78] 71  Resp:  [16-18] 16  BP: (148-195)/() 164/93  SpO2:  [98 %-100 %] 100 %      General: Patient lying in bed, NAD  HEENT: Normocephalic/Atraumatic  Cardio: Appears well-perfused  Pulmonary: No increased work of breathing on RA  Abdomen: Non-distended  Extremities: No edema  Skin: Warm, intact, dry     Neurologic  Mental Status: Subtle aphasia, patient confirms his speech is slightly slurred.  Awake, alert, oriented to person, place,  time, and situation. Able to provide an adequate history and follows command w/o issue. Naming is intact.  Cranial Nerves: PERRL, conjugate gaze, EOMI w/o nystagmus, visual fields intact bilaterally, facial sensation intact, no facial asymmetry, hearing intact to conversation, no dysarthria, symmetric palate rise w/ midline uvula, tongue is midline, shoulder shrug 5/5 bilaterally  Motor: Mild disdiadochokinesia on L.  Normal bulk and tone. No abnormal movements noted. Strength is 5/5 in all extremities in both proximal and distal muscle groups.    Reflexes: Down-going toes bilaterally, no clonus  Sensory:  Numbness has now resolved.  Sensation intact to light touch in b/l upper and lower extremities and in V1,2,3 distributions on bilateral face.  No extinction noted  Coordination: FNF and HS grossly intact bilaterally.  Some difficulty with HS on L.    Station/Gait: Deferred    Stroke Scales    NIHSS at presentation  1a. Level of Consciousness 0-->Alert, keenly responsive   1b. LOC Questions 0-->Answers both questions correctly   1c. LOC Commands 0-->Performs both tasks correctly   2.   Best Gaze 0-->Normal   3.   Visual 0-->No visual loss   4.   Facial Palsy 0-->Normal symmetrical movements   5a. Motor Arm, Left 0-->No drift, limb holds 90 (or 45) degrees for full 10 secs   5b. Motor Arm, Right 0-->No drift, limb holds 90 (or 45) degrees for full 10 secs   6a. Motor Leg, Left 0-->No drift, leg holds 30 degree position for full 5 secs   6b. Motor Leg, right 0-->No drift, leg holds 30 degree position for full 5 secs   7.   Limb Ataxia 0-->Absent   8.   Sensory 1-->Mild-to-moderate sensory loss, patient feels pinprick is less sharp or is dull on the affected side, or there is a loss of superficial pain with pinprick, but patient is aware of being touched   9.   Best Language 0-->No aphasia, normal   10. Dysarthria 0-->Normal   11. Extinction and Inattention  0-->No abnormality   Total 1 (10/02/22 6524)       Modified  [place] Score (Pre-morbid)    -  0    Imaging  I personally reviewed all imaging; relevant findings per HPI.     Lab Results Data   CBC  Recent Labs   Lab 10/02/22  0555 10/01/22  1539   WBC 3.2* 4.0   RBC 5.00 5.35   HGB 14.3 15.5   HCT 42.6 45.5   PLT 90* 94*     Basic Metabolic Panel    Recent Labs   Lab 10/02/22  1512 10/02/22  0555 10/01/22  2133 10/01/22  1559 10/01/22  1539   NA  --  143  --   --  142   POTASSIUM  --  3.3* 2.9*  --  3.3*   CHLORIDE  --  107  --   --  106   CO2  --  29  --   --  28   BUN  --  15.8  --   --  17.9   CR  --  1.24*  --  1.1 1.24*   GLC 94 82  --   --  67*   EUGENIO  --  8.9  --   --  9.5     Liver Panel  No results for input(s): PROTTOTAL, ALBUMIN, BILITOTAL, ALKPHOS, AST, ALT, BILIDIRECT in the last 168 hours.  INR    Recent Labs   Lab Test 10/01/22  1539   INR 1.13      Lipid Profile    Recent Labs   Lab Test 10/02/22  0555 05/21/19  1039   CHOL 114 146   HDL 42 48   LDL 61 76   TRIG 53 112     A1C    Recent Labs   Lab Test 10/02/22  0555   A1C 5.3     Troponin    Recent Labs   Lab 10/01/22  1539   CTROPT 14

## 2022-10-02 NOTE — PLAN OF CARE
Arrived from ED @ 1240    Status: Pt admitted 10/1 for L side numbness. Found to have acute punctate infarct in the posterior R deneen.  Vitals: HTN, labetalol 10mg given before arriving to unit, effective. Goal: systolic BP less than 180mmHG. On RA.  Neuros: A&Ox4. Reports numbness to his L side has improved significantly. Strengths 5/5 t/o.  IV: PIV SL  Labs/Electrolytes: K+ 3.3 this AM. K+ replaced this shift. Redraw @ 03480.   Resp/trach: WNL  Diet: Regular diet  Bowel status: LBM 10/1 per pt report  : Voiding via toilet  Skin: intact  Pain: Denies  Activity: Up ad enoch in room  Social: No visitors this shift but pt reports family will visit this evening. Writer discussed visiting policy w/ pt.  Plan: Continue to monitor and follow POC      Stroke Patients:   PLC scheduled or completed: No. PLC will contact wife to schedule PLC.  Pneumoboots in place: Ordered

## 2022-10-02 NOTE — PHARMACY-CONSULT NOTE
Pharmacy Consult to evaluate for medication related stroke core measures    Geronimo Coats, 62 year old male admitted for stroke workup on 10/1/2022.    Thrombolytic was not given because of Time from onset contraindications    VTE Prophylaxis SCDs /PCDs placed on 10/2/22, as appropriate prior to end of hospital day 2.    Antithrombotic: aspirin and clopidogrel started on 10/1/22, as appropriate by end of hospital day 2. Continue antithrombotic therapy on discharge to meet quality measures, unless contraindicated.    Anticoagulation if history of A-fib/flutter: Patient does not have history of A-fib/flutter - anticoagulation not required for medication related stroke core measures.     LDL Cholesterol Calculated   Date Value Ref Range Status   10/02/2022 61 <=100 mg/dL Final   05/21/2019 76 <100 mg/dL Final     Comment:     Desirable:       <100 mg/dl       Patient currently receiving Lipitor (atorvastatin) continue statin on discharge to meet quality measures, unless contraindicated.    Recommendations: None at this time    Thank you for the consult.    Cosmo Schwartz [facility] 10/2/2022 4:33 PM

## 2022-10-02 NOTE — H&P
[facility]    Stroke Admission Note    Chief Complaint: Left Hemibody Numbess    HPI  Geronimo Coats is a 62 year old male w/ PMHx refractory HTN, chronic Hep B, and multifocal bilateral infarcts who presented on 10/1/2022 with L hemibody numbness, concerning for stroke    The pt reports that he had been in his normal state of health and was at work the night before (9/30) when he developed sudden onset of an abnormal, numbing sensation in his L arm and leg. However, the patient did not decide to come into the ED until the next day when the symptoms persisted. On arrival in the ED, the pt was afebrile but markedly hypertensive (/114), having not taken his PTA blood pressure meds that day. His numbness was still present, but the remainder of his exam was unremarkable, giving him an NIHSS 1. 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 Given his LKW, the pt was not a TNK candidate and no LVO was noted to indicated thrombectomy. As it was somewhat unclear whether the pt's current symptoms were the result of an acute stroke vs hypertensive emergency, the pt then underwent an MRI Brain, which returned positive for an acute punctate infarct in the posterior R deneen. Multiple chronic bilateral microhemorrhages were also present suggestive of a hypertensive microangiopathy. By this time, the pt reported that his numbness had largely improved, though some slight asymmetry in sensation was still present, and he was admitted for further stroke work-up    Of note, the pt has medication refractory HTN and is currently on 3 BP meds (Norvasc, Losartan, and Metoprolol per chart review though the pt himself is unsure which medications he is taking). In addition to this, the pt had presented to [facility]'s ED on 6/2/2022 complaining of generalized weakness and dizziness. At that time, his symptoms  were thought to be due to elevated BP, but CTH at that time did reveal numerous chronic punctate strokes in the R putamen, L thalamus, bilateral frontoparietal white matter, and the L parietal white matter thought to be embolic in origin. For this, the pt was started on ASA 81mg daily and he was scheduled to see Neurology in Nov 2022    Intravenous Thrombolysis  Not given due to:   - unclear or unfavorable risk-benefit profile for extended window thrombolysis beyond the conventional 4.5 hour time window    Endovascular Treatment  Not initiated due to absence of proximal vessel occlusion    Impression   Geronimo Coats is a 62 year old male w/ PMHx refractory HTN, chronic Hep B, and multifocal bilateral infarcts who presented on 10/1/2022 with L hemibody numbness, concerning for stroke. Was subsequently found to have an acute punctate infarct of the R posterior thomas    #Acute ischemic stroke of the Right Posterior Thomas due to small vessel disease vs ESUS  #Bilateral Multifocal Chronic Punctate Infarcts  #Bilateral Multifocal Chronic Microhemorrhages suggestive of hypertensive microangiopathy  Pt presented w/ 1d L hemibody numbness, subsequently found to have an acute R posterior pontine stroke. While the location of the pt's stroke would typically suggest small vessel disease - for which the pt has notable risk factors including severe HTN and questionable GARTH (snores heavily per wife) - his multifocal bilateral strokes also raise the potential of ESUS as an underlying etiology. Still, many of these other punctate strokes and his numerous microhemorrhages are also in areas typically associated with small vessel disease and so this single etiology is still possible  - Admit to Neurology  - Neurochecks Q4h  - As pt's LKW is greater than 24hrs prior, permissive HTN is no longer indicated   - PTA Norvasc 2.5mg daily   - PTA Losartan 100mg daily   - PTA Toprol XL 100mg daily   - PRN Hydralazine/Labetalol for SBP > 180  or DBP > 105  - Avoid hypotonic IV fluids  - ASA 325mg once   - will switch to ASA 81mg daily tomorrow  - Plavix 300mg once   - will switch to Plavix 75mg daily tomorrow  - Atorvastatin 40mg at bedtime   - dosing may change pending lipid panel  - TTE without Bubble Study  - Telemetry, EKG  - Bedside Glucose Monitoring  - A1c, Lipid Panel, Troponin x 3  - PT/OT/SLP  - PM&R  - Stroke Education  - Stroke Class per [facility] ([facility])  - Depression Screen  - Apnea Screen  - Euthermia, Euglycemia  - On discharge, may consider   - 30d Cardiac Monitoring   - Sleep Medicine referral    #Medication Refractory HTN  #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 Per chart review it appears there has been some history of medication non-compliance, though this appears to be due potentially more due to poor medical literacy so future communication regarding medications and lifestyle changes will be important. In addition, it's unclear how much work-up into his refractory HTN has been performed. A Renal US (5/26/2015) has been performed in the past for microscopic hematuria, but the results make no mention of her vasculature   - PTA Norvasc 2.5mg daily  - PTA Losartan 100mg daily  - PTA Toprol XL 100mg daily  - PRN Hydralazine/Labetalol for SBP > 180 or DBP > 105  - Consider repeat Renal US and/or renin, aldosterone, renin:aldosterone ratio testing    #Chronic Hepatitis B  Pt is currently being followed by Dr Leventhal in the GI clinic for his chronic hep b, last seen 12/7/2020. At this visit, was noted to have some degree of hepatic fibrosis and portal hypertension. At that visit, was recommended to undergo Hep D testing and an Ab US w/ elastography before following up in 3mo. From his records, I see no records from any of these tests and has not followed-up as directed. No treatment has yet been recommended  - Have pt follow-up with GI as previously recommended   - Hep D  testing   - Ab US w/ elastography    Diet: Regular Diet w/ thin liquid  VTE PPx: SCDs, ambulatory  Code Status: FULL per discussion w/ patient on 10/1/2022. Also designated his wife, Erick Rudolph, as his medical decision maker should this be needed  Dispo: pending further work-up    The patient was discussed with Stroke Staff, Dr Gandhi.    Lynn Campbell MD  Neurology Resident  Ph [phone]  ___________________________________________________    Nutrition:   Orders Placed This Encounter      Combination Diet Regular Diet    Clinically Significant Risk Factors Present on Admission               # Thrombocytopenia: Plts = 94 10e3/uL (Ref range: 150 - 450 10e3/uL) on admission, will monitor for bleeding        Past Medical History   History reviewed. No pertinent past medical history.  Past Surgical History   History reviewed. No pertinent surgical history.  Medications   Home Meds  Prior to Admission medications    Medication Sig Start Date End Date Taking? Authorizing Provider   amLODIPine (NORVASC) 2.5 MG tablet Take 1 tablet (2.5 mg) by mouth daily 6/21/22  Yes Diana Tripathi APRN CNP   aspirin 81 MG EC tablet Take 81 mg by mouth every 6 hours as needed for moderate pain   Yes Reported, Patient   losartan (COZAAR) 100 MG tablet Take 1 tablet (100 mg) by mouth daily 4/8/22  Yes Diana Tripathi APRN CNP   metoprolol succinate ER (TOPROL-XL) 100 MG 24 hr tablet Take 1 tablet (100 mg) by mouth daily 3/17/22  Yes Lisa Maldonado, NP   Multiple Vitamins-Minerals (ONE-A-DAY MENS 50+ PO) Take 1 tablet by mouth daily   Yes Reported, Patient       Scheduled Meds    amLODIPine  2.5 mg Oral Daily     [START ON 10/2/2022] aspirin  81 mg Oral Daily    Or     [START ON 10/2/2022] aspirin  81 mg Oral or NG Tube Daily     atorvastatin  40 mg Oral QPM     clopidogrel  300 mg Oral or NG Tube Once    Followed by     [START ON 10/2/2022] clopidogrel  75 mg Oral or NG Tube Daily     losartan  100 mg Oral Daily      metoprolol succinate ER  100 mg Oral Daily     sodium chloride (PF)  3 mL Intracatheter Q8H       Infusion Meds    - MEDICATION INSTRUCTIONS -       - MEDICATION INSTRUCTIONS -         PRN Meds  acetaminophen, labetalol **OR** hydrALAZINE, lidocaine 4%, lidocaine (buffered or not buffered), - MEDICATION INSTRUCTIONS -, - MEDICATION INSTRUCTIONS -, polyethylene glycol, senna-docusate, sodium chloride (PF)    Allergies   Allergies   Allergen Reactions     Chocolate      Nuts      Orange Fruit [Cherry] Cough     Family History   Family History   Problem Relation Age of Onset     Hypertension Sister      Social History   Social History     Tobacco Use     Smoking status: Never Smoker     Smokeless tobacco: Never Used   Vaping Use     Vaping Use: Never used   Substance Use Topics     Alcohol use: Yes     Comment: rarely 2-3 times per year     Drug use: Never       Review of Systems   The 10 point Review of Systems is negative other than noted in the HPI        PHYSICAL EXAMINATION  Temp:  [97.5  F (36.4  C)] 97.5  F (36.4  C)  Pulse:  [72-78] 74  Resp:  [16-18] 18  BP: (175-201)/(110-115) 175/115  SpO2:  [99 %-100 %] 100 %    General: Patient lying in bed, NAD  HEENT: Normocephalic/Atraumatic  Cardio: Appears well-perfused  Pulmonary: No increased work of breathing on RA  Abdomen: Non-distended  Extremities: No edema  Skin: Warm, intact, dry    Neurologic  Mental Status: Awake, alert, oriented to person, place, time, and situation. Able to provide an adequate history and follows command w/o issue. Naming is intact. No concern for aphasia or neglect  Cranial Nerves: PERRL, conjugate gaze, EOMI w/o nystagmus, visual fields intact bilaterally, facial sensation intact, no facial asymmetry, hearing intact to conversation, no dysarthria, symmetric palate rise w/ midline uvula, tongue is midline, shoulder shrug 5/5 bilaterally  Motor: Normal bulk and tone. No abnormal movements noted. Strength is 5/5 in all extremities in both  proximal and distal muscle groups  Reflexes: Down-going toes bilaterally, no clonus  Sensory: Reports minor decrease in light touch in distal LUE and throughout the LLE improved from prior. Intact to pinprick in all extremities. No extinction noted  Coordination: FNF and HS intact bilaterally  Station/Gait: Deferred    Dysphagia Screen  Passed screening, no dysarthria - Regular Diet with thin liquids  10/1/22     Stroke Scales    NIHSS  1a. Level of Consciousness 0-->Alert, keenly responsive   1b. LOC Questions 0-->Answers both questions correctly   1c. LOC Commands 0-->Performs both tasks correctly   2.   Best Gaze 0-->Normal   3.   Visual 0-->No visual loss   4.   Facial Palsy 0-->Normal symmetrical movements   5a. Motor Arm, Left 0-->No drift, limb holds 90 (or 45) degrees for full 10 secs   5b. Motor Arm, Right 0-->No drift, limb holds 90 (or 45) degrees for full 10 secs   6a. Motor Leg, Left 0-->No drift, leg holds 30 degree position for full 5 secs   6b. Motor Leg, right 0-->No drift, leg holds 30 degree position for full 5 secs   7.   Limb Ataxia 0-->Absent   8.   Sensory 1-->Mild-to-moderate sensory loss, patient feels pinprick is less sharp or is dull on the affected side, or there is a loss of superficial pain with pinprick, but patient is aware of being touched   9.   Best Language 0-->No aphasia, normal   10. Dysarthria 0-->Normal   11. Extinction and Inattention  0-->No abnormality   Total 1 (10/02/22 0249)       Modified [place] Score (Pre-morbid)  0-No deficits    Imaging  I personally reviewed all imaging; relevant findings per the HPI.    Lab Results Data   CBC  Recent Labs   Lab 10/01/22  1539   WBC 4.0   RBC 5.35   HGB 15.5   HCT 45.5   PLT 94*     Basic Metabolic Panel   Recent Labs   Lab 10/01/22  1559 10/01/22  1539   NA  --  142   POTASSIUM  --  3.3*   CHLORIDE  --  106   CO2  --  28   BUN  --  17.9   CR 1.1 1.24*   GLC  --  67*   EUGENIO  --  9.5     Liver Panel  No results for input(s):  PROTTOTAL, ALBUMIN, BILITOTAL, ALKPHOS, AST, ALT, BILIDIRECT in the last 168 hours.  INR    Recent Labs   Lab Test 10/01/22  1539   INR 1.13      Lipid Profile    Recent Labs   Lab Test 05/21/19  1039   CHOL 146   HDL 48   LDL 76   TRIG 112     A1C  No lab results found.  Troponin    Recent Labs   Lab 10/01/22  1539   CTROPT 14          Stroke Code / Stroke Consult Data Data    Not a stroke code

## 2022-10-03 LAB
ANION GAP SERPL CALCULATED.3IONS-SCNC: 9 MMOL/L (ref 7–15)
BUN SERPL-MCNC: 14.2 MG/DL (ref 8–23)
CALCIUM SERPL-MCNC: 9.1 MG/DL (ref 8.8–10.2)
CHLORIDE SERPL-SCNC: 108 MMOL/L (ref 98–107)
CREAT SERPL-MCNC: 1.2 MG/DL (ref 0.67–1.17)
DEPRECATED HCO3 PLAS-SCNC: 25 MMOL/L (ref 22–29)
ERYTHROCYTE [DISTWIDTH] IN BLOOD BY AUTOMATED COUNT: 13.3 % (ref 10–15)
GFR SERPL CREATININE-BSD FRML MDRD: 68 ML/MIN/1.73M2
GLUCOSE SERPL-MCNC: 96 MG/DL (ref 70–99)
HCT VFR BLD AUTO: 47.1 % (ref 40–53)
HGB BLD-MCNC: 15.9 G/DL (ref 13.3–17.7)
MAGNESIUM SERPL-MCNC: 1.9 MG/DL (ref 1.7–2.3)
MCH RBC QN AUTO: 28.8 PG (ref 26.5–33)
MCHC RBC AUTO-ENTMCNC: 33.8 G/DL (ref 31.5–36.5)
MCV RBC AUTO: 85 FL (ref 78–100)
PHOSPHATE SERPL-MCNC: 2.4 MG/DL (ref 2.5–4.5)
PLATELET # BLD AUTO: 103 10E3/UL (ref 150–450)
POTASSIUM SERPL-SCNC: 3.4 MMOL/L (ref 3.4–5.3)
POTASSIUM SERPL-SCNC: 3.5 MMOL/L (ref 3.4–5.3)
POTASSIUM SERPL-SCNC: 3.7 MMOL/L (ref 3.4–5.3)
RBC # BLD AUTO: 5.53 10E6/UL (ref 4.4–5.9)
SODIUM SERPL-SCNC: 142 MMOL/L (ref 136–145)
WBC # BLD AUTO: 4.1 10E3/UL (ref 4–11)

## 2022-10-03 PROCEDURE — 250N000013 HC RX MED GY IP 250 OP 250 PS 637: Performed by: STUDENT IN AN ORGANIZED HEALTH CARE EDUCATION/TRAINING PROGRAM

## 2022-10-03 PROCEDURE — 36415 COLL VENOUS BLD VENIPUNCTURE: CPT | Performed by: STUDENT IN AN ORGANIZED HEALTH CARE EDUCATION/TRAINING PROGRAM

## 2022-10-03 PROCEDURE — 258N000003 HC RX IP 258 OP 636: Performed by: STUDENT IN AN ORGANIZED HEALTH CARE EDUCATION/TRAINING PROGRAM

## 2022-10-03 PROCEDURE — 84132 ASSAY OF SERUM POTASSIUM: CPT | Performed by: STUDENT IN AN ORGANIZED HEALTH CARE EDUCATION/TRAINING PROGRAM

## 2022-10-03 PROCEDURE — 99231 SBSQ HOSP IP/OBS SF/LOW 25: CPT | Mod: GC | Performed by: STUDENT IN AN ORGANIZED HEALTH CARE EDUCATION/TRAINING PROGRAM

## 2022-10-03 PROCEDURE — 250N000009 HC RX 250: Performed by: STUDENT IN AN ORGANIZED HEALTH CARE EDUCATION/TRAINING PROGRAM

## 2022-10-03 PROCEDURE — 250N000013 HC RX MED GY IP 250 OP 250 PS 637

## 2022-10-03 PROCEDURE — 83735 ASSAY OF MAGNESIUM: CPT | Performed by: STUDENT IN AN ORGANIZED HEALTH CARE EDUCATION/TRAINING PROGRAM

## 2022-10-03 PROCEDURE — 85027 COMPLETE CBC AUTOMATED: CPT | Performed by: STUDENT IN AN ORGANIZED HEALTH CARE EDUCATION/TRAINING PROGRAM

## 2022-10-03 PROCEDURE — 120N000002 HC R&B MED SURG/OB UMMC

## 2022-10-03 PROCEDURE — 84100 ASSAY OF PHOSPHORUS: CPT | Performed by: STUDENT IN AN ORGANIZED HEALTH CARE EDUCATION/TRAINING PROGRAM

## 2022-10-03 RX ORDER — CARVEDILOL 6.25 MG/1
12.5 TABLET ORAL 2 TIMES DAILY WITH MEALS
Status: DISCONTINUED | OUTPATIENT
Start: 2022-10-03 | End: 2022-10-05 | Stop reason: HOSPADM

## 2022-10-03 RX ORDER — POTASSIUM CHLORIDE 750 MG/1
40 TABLET, EXTENDED RELEASE ORAL ONCE
Status: COMPLETED | OUTPATIENT
Start: 2022-10-03 | End: 2022-10-03

## 2022-10-03 RX ADMIN — LOSARTAN POTASSIUM 100 MG: 100 TABLET, FILM COATED ORAL at 07:50

## 2022-10-03 RX ADMIN — ASPIRIN 81 MG: 81 TABLET, COATED ORAL at 07:50

## 2022-10-03 RX ADMIN — CARVEDILOL 6.25 MG: 6.25 TABLET, FILM COATED ORAL at 07:50

## 2022-10-03 RX ADMIN — AMLODIPINE BESYLATE 10 MG: 10 TABLET ORAL at 07:50

## 2022-10-03 RX ADMIN — POTASSIUM PHOSPHATE, MONOBASIC AND POTASSIUM PHOSPHATE, DIBASIC 9 MMOL: 224; 236 INJECTION, SOLUTION, CONCENTRATE INTRAVENOUS at 12:59

## 2022-10-03 RX ADMIN — CARVEDILOL 12.5 MG: 6.25 TABLET, FILM COATED ORAL at 17:49

## 2022-10-03 RX ADMIN — CLOPIDOGREL BISULFATE 75 MG: 75 TABLET ORAL at 07:50

## 2022-10-03 RX ADMIN — ATORVASTATIN CALCIUM 20 MG: 20 TABLET, FILM COATED ORAL at 19:16

## 2022-10-03 RX ADMIN — POTASSIUM CHLORIDE 40 MEQ: 750 TABLET, EXTENDED RELEASE ORAL at 12:11

## 2022-10-03 ASSESSMENT — ACTIVITIES OF DAILY LIVING (ADL)
ADLS_ACUITY_SCORE: 18
DEPENDENT_IADLS:: TRANSPORTATION
ADLS_ACUITY_SCORE: 18

## 2022-10-03 NOTE — PLAN OF CARE
[id number]    Status: Pt admitted 10/1 for L side numbness. Found to have acute punctate infarct in the posterior R deneen.  Vitals: HTN, labetalol 10mg given x1 this shift, effective. Goal: systolic BP less than 180mmHG. On RA. CCM.  Neuros: A&Ox4. Pt denies numbness to L side of the body. Strengths 5/5 t/o.  IV: PIV SL  Labs/Electrolytes: K+ replace this morning, redraw @ 1945 resulted at 3.2. Potassium replacement ordered again - redraw 0200.  Resp/trach: WNL  Diet: Regular diet. Family brought food, good PO intake.  Bowel status: LBM 10/1 per pt report  : Voiding via toilet  Skin: intact  Pain: Denies  Activity: Up ad enoch in room  Social: Spouse visited this evening  Plan: Continue to monitor and follow POC

## 2022-10-03 NOTE — PLAN OF CARE
Status: Pt admitted 10/1 for L side numbness. Found to have acute punctate infarct in the posterior R deneen.  Vitals: HTN, within parameters this shift-no additional meds given. CCM.  Neuros: A&Ox4. Pt denies N/T. Strengths 5/5.  IV: PIV SL  Labs/Electrolytes: K+ replaced this shift, redraw @ 1600 Phos replaced-redraw in morning Resp/trach: WNL. On RA.   Diet: Regular diet. Fair po   Bowel status: LBM 10/1 per pt report  : Voiding w/o difficulty via toilet  Skin: intact  Pain: Denies  Activity: Up ad enoch in room  Plan: Continue to monitor and follow POC   Updates this shift: Phos being replaced IV. B/Ps have been fluctuating. Renal ultrasound ordered-will be done tomorrow morning. NPO at midnight. Morning labs ordered.        Stroke Patients:   [facility] scheduled or completed: Not scheduled   Pneumoboots in place: No pt refused. Up ad enoch.

## 2022-10-03 NOTE — CONSULTS
Care Management Initial Consult    General Information  Assessment completed with: Vianey Geronimo  Type of CM/SW Visit: Initial Assessment    Primary Care Provider verified and updated as needed: Yes   Readmission within the last 30 days:      Reason for Consult: discharge planning  Advance Care Planning:          Communication Assessment  Patient's communication style: spoken language (English or Bilingual)    Hearing Difficulty or Deaf: no   Wear Glasses or Blind: no    Cognitive  Cognitive/Neuro/Behavioral: WDL  Level of Consciousness: alert  Arousal Level: opens eyes spontaneously  Orientation: oriented x 4     Best Language: 0 - No aphasia  Speech: clear, spontaneous, logical    Living Environment:   People in home: spouse     Current living Arrangements: apartment      Able to return to prior arrangements: yes    Family/Social Support:  Care provided by: self  Provides care for: no one  Marital Status:   Other (specify), Wife (friends)  Erick       Description of Support System: Involved, Supportive       Current Resources:   Patient receiving home care services: No     Community Resources:    Equipment currently used at home: none  Supplies currently used at home:      Functional Status:  Prior to admission patient needed assistance:   Dependent ADLs:: Independent  Dependent IADLs:: Transportation    Additional Information:  Patient status reviewed, discussed in discharge rounds. Met with patient to complete initial assessment. Patient lives in an apartment with his wife, he is independent with ADLs/IADLs at baseline. He uses uber or public transportation. He denies current or previous home or community services.     Layne Blakely RN, BSN  6A RN Care Coordinator  Ph: [phone]   Pager: [phone]

## 2022-10-03 NOTE — CARE PLAN
Speech Language Pathology: Orders received. Chart reviewed and discussed with care team.? Speech Language Pathology not indicated due to pt tolerating regular/thin liquid diet with no concerns per EMR review and per RN report. Note that all therapy orders were re-consulted however spoke with RN and provider who report no change in pt status and pt is possibly discharging home today. No speech therapy needs. Defer discharge recommendations to MD.? Will complete orders.

## 2022-10-03 NOTE — PLAN OF CARE
Status: Admitted 10/1 with L sided numbness, found to have acute infarct in the posterior right deneen  Vitals: VSS on RA, no PRNs needed to keep SBP < 180   Neuros: Intact   IV: PIV SL  Labs/Electrolytes: K+ redraw at 0200 WNL, redraws this AM  Resp/trach: Clear lungs, denies SOB  Diet: Reg  Bowel status: LBM 10/1  : Voiding  Pain: Denies  Activity: Independent  Plan/updates: BP meds being adjusted, continue POC

## 2022-10-03 NOTE — PLAN OF CARE
Occupational Therapy: Orders received. Chart reviewed and discussed with care team.? Occupational Therapy not indicated due to patient reporting continued IND with ADL tasks and near baseline. Spoke with stroke team via phone who reports IP therapy consult not needed; accidental re-consult placed for therapies after deferral yesterday.? Defer discharge recommendations to medical team.? Will complete orders.

## 2022-10-03 NOTE — PROGRESS NOTES
[facility]    Stroke Progress Note    Interval EventsNAOE.  Remained hypertensive to 150s, 160s overnight.  Intermittently to 170s, 180s throughout the day.  Did have one normotensive blood pressure during the day, discussed with RN, nothing unusual -- patient has been resting in bed apparently comfortably for all BP measurements today.      Patient reports complete resolution of L hemibody numbness and improved dysarthria and dysdiadochokinesia on L.  Refractory HTN persists in 150s, 160s.      #Major management changes / active issues today  -Refractory HTN persists   *Renovascular US    *NPO at midnight   *Renin/elda ratio   *Further tests, referrals pending work-up  -Increasing coreg to 12.5 BID    HPI Summary  Geronimo Coats is a 62 year old male w/ PMHx refractory HTN, chronic Hep B, and multifocal bilateral infarcts who presented on 10/1/2022 with L hemibody numbness, concerning for stroke     The pt reports that he had been in his normal state of health and was at work the night before (9/30) when he developed sudden onset of an abnormal, numbing sensation in his L arm and leg. However, the patient did not decide to come into the ED until the next day when the symptoms persisted. On arrival in the ED, the pt was afebrile but markedly hypertensive (/114), having not taken his PTA blood pressure meds that day. His numbness was still present, but the remainder of his exam was unremarkable, giving him an NIHSS 1. 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 Given his LKW, the pt was not a TNK candidate and no LVO was noted to indicated thrombectomy. As it was somewhat unclear whether the pt's current symptoms were the result of an acute stroke vs hypertensive emergency, the pt then underwent an MRI Brain, which returned positive for an acute punctate infarct in the  posterior R deneen. Multiple chronic bilateral microhemorrhages were also present suggestive of a hypertensive microangiopathy. By this time, the pt reported that his numbness had largely improved, though some slight asymmetry in sensation was still present, and he was admitted for further stroke work-up     Of note, the pt has medication refractory HTN and is currently on 3 BP meds (Norvasc, Losartan, and Metoprolol per chart review though the pt himself is unsure which medications he is taking). In addition to this, the pt had presented to [facility]'s ED on 6/2/2022 complaining of generalized weakness and dizziness. At that time, his symptoms were thought to be due to elevated BP, but CTH at that time did reveal numerous chronic punctate strokes in the R putamen, L thalamus, bilateral frontoparietal white matter, and the L parietal white matter thought to be embolic in origin. For this, the pt was started on ASA 81mg daily and he was scheduled to see Neurology in Nov 2022    Stroke Evaluation Summarized    MRI/Head CT 1. Punctate acute infarct in the posterior right deneen.  2. Moderate to severe sequelae of chronic small vessel ischemic  disease and multiple old lacunar infarcts.  3. Numerous chronic microhemorrhages in a distribution suggestive of  hypertensive microangiopathy.   Intracranial and Cervical Vasculature 1. Head CTA demonstrates no aneurysm or stenosis of the major intracranial arteries. Anterior predominant cerebral circulation.  2. Neck CTA demonstrates hypoplastic left vertebral and basilar arteries. Minimal to no contrast opacification seen within the V3 segment of the left vertebral artery with distal reconstitution of the V4 segment, likely chronic. No carotid artery stenosis.         Echocardiogram Left ventricular function is borderline. Biplane LVEF is 56%. No regional wall  motion abnormalities. Mild concentric LVH.  Global right ventricular function is normal.  No significant valve  abnormalities.  The inferior vena cava is normal.   EKG/Telemetry No Afib.  Consistent with LVH. Possible lateral ischemia   Other Testing Troponin WNL     LDL  10/2/2022: 61 mg/dL   A1C  10/2/2022: 5.3 %   Troponin 10/1/2022: 14 ng/L       Impression   62 M PMHx of refractory HTN, chronic HepB, multifocal bilateral micro-hemorrhages p/w R-hemibody numbness c/f stroke w/ punctate infarct of R posterior thomas on MR felt to be 2/2 microvascular angiopathy vs less likely ESUS.    Plan  #Acute ischemic stroke of the Right Posterior Thomas due to small vessel disease vs (less likely) ESUS  #Bilateral Multifocal Chronic Punctate Infarcts  #Bilateral Multifocal Chronic Microhemorrhages suggestive of hypertensive microangiopathy  P/w 1 day of L hemibody numbness, found w/ acute R posterior pontine stroke.  Location, refractory HTN, c/f GARTH (snores heavily) c/w small vessel disease.  However, multifocal b/l strokes raise possibility of ESUS, which is felt to be less likely given location of prior hemorrhages, risk factors as above.    - Admit to Neurology  - Neurochecks Q4h  - As pt's LKW is greater than 24hrs prior, permissive HTN is no longer indicated              - Manage HTN as below   - Avoid hypotonic IV fluids  - Continue ASA 81mg daily (loaded at presentation)  - Continue Plavix 75mg daily (loaded at presentation)  - Continue atorvastatin 20 mg               - LDL 61  - TTE w/ mild concentric hypertrophy (c/w longstanding HTN), no valvular or atrial abnormalities  - Telemetry, EKG without evidence of arrhythmia so far  - Bedside Glucose Monitoring  - A1c 5.3, troponin WNL   - PT/OT/SLP okay with discharge home  - Stroke Education   *Stroke Class per [facility]  - Depression Screen  - Apnea Screen  - Euthermia, Euglycemia  - On discharge, may consider              - 30d Cardiac Monitoring              - Sleep Medicine referral     #HTN  #Concern for secondary hypertension  HTN refractory to 3  medications, c/f secondary cause which has not been worked up per chart review, now c/b CNS hemorrhage.    - Renovascular US, renin/elda ratio in AM   *Further work-up pending results  - PTA Norvasc 10 mg daily  - PTA Losartan 100 mg daily  - INCREASE Coreg to 12.5 BID  - PRN Hydralazine/Labetalol for SBP > 180 or DBP > 105       #Chronic Hepatitis B  C/b hepatic fibrosis, pHTN.  Follows w/ leventhal in GI clinic, last seen 12/2020.  Had recommended Hep D testing, Ab US w/ electrography, but patient was apparently lost to follow-up.   - Have pt follow-up with GI as previously recommended              - Hep D testing outpatient               - Ab US w/ elastography     Diet: Regular Diet w/ thin liquid  VTE PPx: SCDs, ambulatory  Code Status: FULL per discussion w/ patient on 10/1/2022. Also designated his wife, Erick Rudolph, as his medical decision maker should this be needed  Dispo: pending further work-up    Patient Follow-up    - final recommendation pending work-up    We will admit this patient to the Stroke Service.    Patient discussed with my supervising physician, Dr. Ku, who agrees with the critical findings, assessment, and plan as documented in the note above, or as otherwise noted in their attestation or staff note.      Manjit Kaufman MD, MS  PGY-1, Neurology      ______________________________________________________    Clinically Significant Risk Factors Present on Admission                    Medications   Scheduled Meds    amLODIPine  10 mg Oral Daily     aspirin  81 mg Oral Daily    Or     aspirin  81 mg Oral or NG Tube Daily     atorvastatin  20 mg Oral QPM     carvedilol  6.25 mg Oral BID w/meals     clopidogrel  75 mg Oral or NG Tube Daily     losartan  100 mg Oral Daily     sodium chloride (PF)  3 mL Intracatheter Q8H       Infusion Meds    - MEDICATION INSTRUCTIONS -       - MEDICATION INSTRUCTIONS -         PRN Meds  acetaminophen, labetalol **OR** hydrALAZINE, lidocaine 4%, lidocaine  (buffered or not buffered), - MEDICATION INSTRUCTIONS -, - MEDICATION INSTRUCTIONS -, melatonin, polyethylene glycol, senna-docusate, sodium chloride (PF)       PHYSICAL EXAMINATION  Temp:  [47.1  F (8.4  C)-98.7  F (37.1  C)] 97.7  F (36.5  C)  Pulse:  [63-76] 63  Resp:  [14-18] 14  BP: (130-195)/() 146/85  SpO2:  [96 %-100 %] 99 %      General: Patient lying in bed, NAD  HEENT: Normocephalic/Atraumatic  Cardio: Appears well-perfused  Pulmonary: No increased work of breathing on RA  Abdomen: Non-distended  Extremities: No edema  Skin: Warm, intact, dry     Neurologic  Mental Status: No aphasia - improved compared to yesterday.  Awake, alert, oriented to person, place, time, and situation. Able to provide an adequate history and follows command w/o issue. Naming is intact.  Cranial Nerves: PERRL, conjugate gaze, EOMI w/o nystagmus, visual fields intact bilaterally, facial sensation intact, no facial asymmetry, hearing intact to conversation, no dysarthria, symmetric palate rise w/ midline uvula, tongue is midline, shoulder shrug 5/5 bilaterally  Motor: No asymmetric disdiadochokinesia - improved compared to yesterday.  Normal bulk and tone. No abnormal movements noted. Strength is 5/5 in all extremities in both proximal and distal muscle groups.    Sensory:  Numbness has now resolved.  Sensation intact to light touch in b/l upper and lower extremities and in V1,2,3 distributions on bilateral face.  No extinction noted  Coordination: FNF and HS grossly intact bilaterally.  No difficulty with HS on L - improved compared to yesterday    Station/Gait: Deferred    Stroke Scales    NIHSS at presentation  1a. Level of Consciousness 0-->Alert, keenly responsive   1b. LOC Questions 0-->Answers both questions correctly   1c. LOC Commands 0-->Performs both tasks correctly   2.   Best Gaze 0-->Normal   3.   Visual 0-->No visual loss   4.   Facial Palsy 0-->Normal symmetrical movements   5a. Motor Arm, Left 0-->No drift,  limb holds 90 (or 45) degrees for full 10 secs   5b. Motor Arm, Right 0-->No drift, limb holds 90 (or 45) degrees for full 10 secs   6a. Motor Leg, Left 0-->No drift, leg holds 30 degree position for full 5 secs   6b. Motor Leg, right 0-->No drift, leg holds 30 degree position for full 5 secs   7.   Limb Ataxia 0-->Absent   8.   Sensory 0-->Normal, no sensory loss   9.   Best Language 0-->No aphasia, normal   10. Dysarthria 0-->Normal   11. Extinction and Inattention  0-->No abnormality   Total 0 (10/03/22 0400)       Modified Isaias Score (Pre-morbid)    -  0    Imaging  I personally reviewed all imaging; relevant findings per HPI.     Lab Results Data   CBC  Recent Labs   Lab 10/02/22  0555 10/01/22  1539   WBC 3.2* 4.0   RBC 5.00 5.35   HGB 14.3 15.5   HCT 42.6 45.5   PLT 90* 94*     Basic Metabolic Panel    Recent Labs   Lab 10/03/22  0133 10/02/22  2005 10/02/22  1912 10/02/22  1512 10/02/22  0555 10/01/22  2133 10/01/22  1559 10/01/22  1539   NA  --   --   --   --  143  --   --  142   POTASSIUM 3.5 3.2*  --   --  3.3*   < >  --  3.3*   CHLORIDE  --   --   --   --  107  --   --  106   CO2  --   --   --   --  29  --   --  28   BUN  --   --   --   --  15.8  --   --  17.9   CR  --   --   --   --  1.24*  --  1.1 1.24*   GLC  --   --  117* 94 82  --   --  67*   EUGENIO  --   --   --   --  8.9  --   --  9.5    < > = values in this interval not displayed.     Liver Panel  No results for input(s): PROTTOTAL, ALBUMIN, BILITOTAL, ALKPHOS, AST, ALT, BILIDIRECT in the last 168 hours.  INR    Recent Labs   Lab Test 10/01/22  1539   INR 1.13      Lipid Profile    Recent Labs   Lab Test 10/02/22  0555 05/21/19  1039   CHOL 114 146   HDL 42 48   LDL 61 76   TRIG 53 112     A1C    Recent Labs   Lab Test 10/02/22  0555   A1C 5.3     Troponin    Recent Labs   Lab 10/01/22  1539   CTROPT 14

## 2022-10-04 ENCOUNTER — APPOINTMENT (OUTPATIENT)
Dept: ULTRASOUND IMAGING | Facility: CLINIC | Age: 62
DRG: 065 | End: 2022-10-04
Payer: COMMERCIAL

## 2022-10-04 LAB
ALDOST SERPL-MCNC: 35.5 NG/DL (ref 0–31)
ANION GAP SERPL CALCULATED.3IONS-SCNC: 8 MMOL/L (ref 7–15)
BUN SERPL-MCNC: 16.1 MG/DL (ref 8–23)
CALCIUM SERPL-MCNC: 9.2 MG/DL (ref 8.8–10.2)
CHLORIDE SERPL-SCNC: 105 MMOL/L (ref 98–107)
CREAT SERPL-MCNC: 1.11 MG/DL (ref 0.67–1.17)
DEPRECATED HCO3 PLAS-SCNC: 25 MMOL/L (ref 22–29)
ERYTHROCYTE [DISTWIDTH] IN BLOOD BY AUTOMATED COUNT: 13.4 % (ref 10–15)
GFR SERPL CREATININE-BSD FRML MDRD: 74 ML/MIN/1.73M2
GLUCOSE SERPL-MCNC: 89 MG/DL (ref 70–99)
HCT VFR BLD AUTO: 48.9 % (ref 40–53)
HGB BLD-MCNC: 16.6 G/DL (ref 13.3–17.7)
MAGNESIUM SERPL-MCNC: 1.9 MG/DL (ref 1.7–2.3)
MCH RBC QN AUTO: 28.2 PG (ref 26.5–33)
MCHC RBC AUTO-ENTMCNC: 33.9 G/DL (ref 31.5–36.5)
MCV RBC AUTO: 83 FL (ref 78–100)
PHOSPHATE SERPL-MCNC: 2.5 MG/DL (ref 2.5–4.5)
PLATELET # BLD AUTO: 92 10E3/UL (ref 150–450)
POTASSIUM SERPL-SCNC: 3.6 MMOL/L (ref 3.4–5.3)
RBC # BLD AUTO: 5.88 10E6/UL (ref 4.4–5.9)
SODIUM SERPL-SCNC: 138 MMOL/L (ref 136–145)
WBC # BLD AUTO: 3.8 10E3/UL (ref 4–11)

## 2022-10-04 PROCEDURE — 82088 ASSAY OF ALDOSTERONE: CPT

## 2022-10-04 PROCEDURE — 93975 VASCULAR STUDY: CPT

## 2022-10-04 PROCEDURE — 99231 SBSQ HOSP IP/OBS SF/LOW 25: CPT | Mod: GC | Performed by: STUDENT IN AN ORGANIZED HEALTH CARE EDUCATION/TRAINING PROGRAM

## 2022-10-04 PROCEDURE — 120N000002 HC R&B MED SURG/OB UMMC

## 2022-10-04 PROCEDURE — 250N000013 HC RX MED GY IP 250 OP 250 PS 637

## 2022-10-04 PROCEDURE — 250N000013 HC RX MED GY IP 250 OP 250 PS 637: Performed by: STUDENT IN AN ORGANIZED HEALTH CARE EDUCATION/TRAINING PROGRAM

## 2022-10-04 PROCEDURE — 84100 ASSAY OF PHOSPHORUS: CPT | Performed by: STUDENT IN AN ORGANIZED HEALTH CARE EDUCATION/TRAINING PROGRAM

## 2022-10-04 PROCEDURE — 84244 ASSAY OF RENIN: CPT

## 2022-10-04 PROCEDURE — 85027 COMPLETE CBC AUTOMATED: CPT | Performed by: STUDENT IN AN ORGANIZED HEALTH CARE EDUCATION/TRAINING PROGRAM

## 2022-10-04 PROCEDURE — 82310 ASSAY OF CALCIUM: CPT | Performed by: STUDENT IN AN ORGANIZED HEALTH CARE EDUCATION/TRAINING PROGRAM

## 2022-10-04 PROCEDURE — 93975 VASCULAR STUDY: CPT | Mod: 26 | Performed by: RADIOLOGY

## 2022-10-04 PROCEDURE — 83735 ASSAY OF MAGNESIUM: CPT | Performed by: STUDENT IN AN ORGANIZED HEALTH CARE EDUCATION/TRAINING PROGRAM

## 2022-10-04 PROCEDURE — 36415 COLL VENOUS BLD VENIPUNCTURE: CPT | Performed by: STUDENT IN AN ORGANIZED HEALTH CARE EDUCATION/TRAINING PROGRAM

## 2022-10-04 PROCEDURE — 250N000011 HC RX IP 250 OP 636: Performed by: STUDENT IN AN ORGANIZED HEALTH CARE EDUCATION/TRAINING PROGRAM

## 2022-10-04 RX ORDER — CLONIDINE HYDROCHLORIDE 0.1 MG/1
0.1 TABLET ORAL 2 TIMES DAILY
Status: DISCONTINUED | OUTPATIENT
Start: 2022-10-04 | End: 2022-10-05 | Stop reason: HOSPADM

## 2022-10-04 RX ORDER — HEPARIN SODIUM 5000 [USP'U]/.5ML
5000 INJECTION, SOLUTION INTRAVENOUS; SUBCUTANEOUS EVERY 12 HOURS
Status: DISCONTINUED | OUTPATIENT
Start: 2022-10-04 | End: 2022-10-05 | Stop reason: HOSPADM

## 2022-10-04 RX ADMIN — ATORVASTATIN CALCIUM 20 MG: 20 TABLET, FILM COATED ORAL at 19:45

## 2022-10-04 RX ADMIN — HEPARIN SODIUM 5000 UNITS: 5000 INJECTION, SOLUTION INTRAVENOUS; SUBCUTANEOUS at 19:45

## 2022-10-04 RX ADMIN — AMLODIPINE BESYLATE 10 MG: 10 TABLET ORAL at 07:56

## 2022-10-04 RX ADMIN — LOSARTAN POTASSIUM 100 MG: 100 TABLET, FILM COATED ORAL at 07:56

## 2022-10-04 RX ADMIN — CARVEDILOL 12.5 MG: 6.25 TABLET, FILM COATED ORAL at 18:28

## 2022-10-04 RX ADMIN — CLONIDINE HYDROCHLORIDE 0.1 MG: 0.1 TABLET ORAL at 19:45

## 2022-10-04 RX ADMIN — CARVEDILOL 12.5 MG: 6.25 TABLET, FILM COATED ORAL at 07:56

## 2022-10-04 RX ADMIN — CLOPIDOGREL BISULFATE 75 MG: 75 TABLET ORAL at 07:56

## 2022-10-04 RX ADMIN — ASPIRIN 81 MG: 81 TABLET, COATED ORAL at 07:56

## 2022-10-04 ASSESSMENT — ACTIVITIES OF DAILY LIVING (ADL)
ADLS_ACUITY_SCORE: 18

## 2022-10-04 NOTE — PLAN OF CARE
Status: Pt admitted 10/1 for L side numbness. Found to have acute punctate infarct in the posterior R deneen.  Vitals: VSS. Goal: systolic BP less than 180mmHG. On RA. CCM.  Neuros: Intact  IV: PIV SL  Labs/Electrolytes: K+ redraw resulted @ 3.7. Phos redraw tomorrow AM  Resp/trach: WNL  Diet: Regular diet, NPO starting midnight, sips of water OK  Bowel status: LBM 10/3 per pt report  : Voiding via toilet  Skin: intact  Pain: Denies  Activity: Up ad enoch in room  Social: No visitors this shift  Plan: NPO midnight for Renal Ultrasound tomorrow 10/4

## 2022-10-04 NOTE — DISCHARGE SUMMARY
[facility]    Neurology Stroke Discharge Summary    Date of Admission: 10/1/2022  Date of Discharge: 10/04/2022    Disposition: Discharged to home  Primary Care Physician: Diana Tripathi      Admission Diagnosis:   HTN, refractory  Chronic HepB    Discharge Diagnosis:   Acute ischemic stroke of R posterior deneen due to small-vessel disease 2/2 refractory HTN  Hyperaldosteronism c/b secondary HTN  Concern for obstructive sleep apnea  Chronic hepatitis B    Problem Leading to Hospitalization (from [facility]):   Geronimo Coats is a 62 year old male w/ PMHx refractory HTN, chronic Hep B, and multifocal bilateral infarcts who presented on 10/1/2022 with L hemibody numbness which had ddeveloped the night before, concerning for stroke.     Please see H&P dated 10/1/2022 for further details about presentation.    Brief Hospital Course:   Patient presented with L hemibody numbness, and was 62 M PMHx of refractory HTN, chronic HepB, multifocal bilateral micro-hemorrhages p/w R-hemibody numbness c/f stroke w/ punctate infarct of R posterior deneen on MR felt to be 2/2 microvascular angiopathy 2/2 high grade medication-refractory hypertension.  Further stroke work-up and findings as below.      In addition to management of stroke, patient was noted to be persistently hypertensive requiring addition of several antihypertensives (Norvasc, Coreg, Clonidine) in context of persistent hypokalemia requiring repeat potassium supplementation.  Work-up for 2/2 HTN returned at time of discharge included renal artery US which was negative for significant stenosis, as well as a renin/aldosterone ratio.  At time of discharge, renin had not returned, but AM aldosterone returned at 35.5 which, in context of hypokalemia and medication refractory HTN is consistent with 2/2 hypertension arising from hyperaldosteronism.  We are recommending rapid follow-up with PCP and endocrinology for further  work-up and management, in addition to standard follow-up with neurology. The importance of rapid follow-up was thoroughly discussed with Mr. Coats -- we strongly impressed upon him our belief that if his hyperaldosteronism and secondary hypertension are not aggressively addressed, he is at very high risk of stroke and/or death.    Additionally we are concerned for possible GARTH, based on a STOP-BANG of 5.    Found to have punctate infarct of R posterior deneen.      IV thrombolysis was not performed due to uncertain risk/benefit profile due to being out of the traditional 4.5 hour window for pharmacological thrombolysis.      Work-up as stated below under Pertinent Investigations.    Etiology is thought to be small vessel disease 2/2 secondary, medication refractory hypertension arising from hyperaldosteronism.      Rehab evaluation: OT, PT, SLP and PM&R.     Smoking Cessation: patient is not a smoker    BP Long-term Goal: 140/90 or less    Antithrombotic/Anticoagulant Agent: 21 day DAPT w/ Asprin 81 and Plavix 75, Asprin 81 mg indefinitely thereafter     Statins: Started on atovastatin 20       Hgb A1C Goal: < 7.0    Complications: None.     Other problems addressed during this hospitalization:  Medication refractory HTN 2/2 hyperaldosteronism as described above  Concern for sleep apnea, follow-up placed    PERTINENT INVESTIGATIONS    Labs  Lipid Panel: Recent Labs   Lab Test 10/02/22  0555   CHOL 114   HDL 42   LDL 61   TRIG 53     A1C:   Lab Results   Component Value Date    A1C 5.3 10/02/2022     INR:   Recent Labs   Lab 10/01/22  1539   INR 1.13      Coag Panel / Hypercoag Workup: INR/PTT returned WNL  Pending test results: Renin Level, endocrine and PCP follow-up    Echo:  #TTE 10/2/22  Interpretation Summary  Left ventricular function is borderline. Biplane LVEF is 56%. No regional wall  motion abnormalities. Mild concentric LVH.  Global right ventricular function is normal.  No significant valve  abnormalities.  The inferior vena cava is normal.  There is no prior study for direct comparison.    #Renal Artery US 10/4/22  IMPRESSION:   1. No renal artery stenosis demonstrated.  2. 1 cm left inferior pole intrarenal calculus.  3. Bilateral simple renal cortical cysts.    Imaging:  #CT Head 10/1/22   1. No acute intracranial pathology.  2. Old lacunar infarcts and moderate chronic small vessel ischemic  disease.     #CTA 10/1/22  1. Head CTA demonstrates no aneurysm or stenosis of the major  intracranial arteries. Anterior predominant cerebral circulation.  2. Neck CTA demonstrates hypoplastic left vertebral and basilar  arteries. Minimal to no contrast opacification seen within the V3  segment of the left vertebral artery with distal reconstitution of the  V4 segment, likely chronic. No carotid artery stenosis.    #MR Brain 10/1/22  IMPRESSION:  1. Punctate acute infarct in the posterior right deneen.  2. Moderate to severe sequelae of chronic small vessel ischemic  disease and multiple old lacunar infarcts.  3. Numerous chronic microhemorrhages in a distribution suggestive of  hypertensive microangiopathy.  Endovascular procedure: None     Cardiac Monitoring: Patient had > 24 hrs of cardiac monitor while in hospital.    Findings: No atrial fibrillation was found. and Patient discharged with referral for 30 day home cardiac monitor    Sleep Apnea Screen: STOP-BANG Score 5    Sleep Apnea Screen Findings: Patient has 2 or more symptoms of sleep apnea.  Outpatient sleep study is recommended.    PHQ-9 Depression Screen Score: 1    Education discussed with: patient on blood pressure management, cholesterol management, medical management, diet modification, exercise recommendation, follow-up recommendations/plan, 911 call if warning signs of stroke and results (HTN work-up, stroke diagnosis as above).    During daily rounds, the plan of care was discussed and developed with patient.  Plan of care includes: Rapid  follow-up with PCP, endocrineology for work-up of refractory HTN.  Interval follow-up with Neurology.  Medication adjustments as below..    PHYSICAL EXAMINATION  Vital Signs:  B/P: 150/71, T: 97.9, P: 69, R: 18    General: Patient lying in bed, NAD  HEENT: Normocephalic/Atraumatic  Cardio: Appears well-perfused  Pulmonary: No increased work of breathing on RA  Abdomen: Non-distended  Extremities: No edema  Skin: Warm, intact, dry     Neurologic  Mental Status: No aphasia - improved compared to yesterday.  Awake, alert, oriented to person, place, time, and situation. Able to provide an adequate history and follows command w/o issue. Naming is intact.  Cranial Nerves: PERRL, conjugate gaze, EOMI w/o nystagmus, visual fields intact bilaterally, facial sensation intact, no facial asymmetry, hearing intact to conversation, no dysarthria, symmetric palate rise w/ midline uvula, tongue is midline, shoulder shrug 5/5 bilaterally  Motor: No asymmetric disdiadochokinesia - improved compared to yesterday.  Normal bulk and tone. No abnormal movements noted. Strength is 5/5 in all extremities in both proximal and distal muscle groups.    Sensory:  Numbness has now resolved.  Sensation intact to light touch in b/l upper and lower extremities and in V1,2,3 distributions on bilateral face.  No extinction noted  Coordination: FNF and HS grossly intact bilaterally.  No difficulty with HS on L - improved compared to yesterday    Station/Gait: Deferred     Stroke Scales     NIHSS at presentation  1a. Level of Consciousness 0-->Alert, keenly responsive   1b. LOC Questions 0-->Answers both questions correctly   1c. LOC Commands 0-->Performs both tasks correctly   2.   Best Gaze 0-->Normal   3.   Visual 0-->No visual loss   4.   Facial Palsy 0-->Normal symmetrical movements   5a. Motor Arm, Left 0-->No drift, limb holds 90 (or 45) degrees for full 10 secs   5b. Motor Arm, Right 0-->No drift, limb holds 90 (or 45) degrees for full 10 secs    6a. Motor Leg, Left 0-->No drift, leg holds 30 degree position for full 5 secs   6b. Motor Leg, right 0-->No drift, leg holds 30 degree position for full 5 secs   7.   Limb Ataxia 0-->Absent   8.   Sensory 0-->Normal, no sensory loss   9.   Best Language 0-->No aphasia, normal   10. Dysarthria 0-->Normal   11. Extinction and Inattention  0-->No abnormality   Total 0 (10/04/22 0400)         [employer] Stroke Scale (on day of discharge)  NIHSS Total Score: 0    Modified Isaias Score (Discharge)  0-No deficits    Medications    Current Discharge Medication List      CONTINUE these medications which have NOT CHANGED    Details   amLODIPine (NORVASC) 2.5 MG tablet Take 1 tablet (2.5 mg) by mouth daily  Qty: 90 tablet, Refills: 3    Comments: Hold until patient calls for refill  Associated Diagnoses: Benign essential hypertension      aspirin 81 MG EC tablet Take 81 mg by mouth every 6 hours as needed for moderate pain      losartan (COZAAR) 100 MG tablet Take 1 tablet (100 mg) by mouth daily  Qty: 90 tablet, Refills: 3    Associated Diagnoses: Benign essential hypertension      metoprolol succinate ER (TOPROL-XL) 100 MG 24 hr tablet Take 1 tablet (100 mg) by mouth daily  Qty: 90 tablet, Refills: 1    Associated Diagnoses: Benign essential hypertension      Multiple Vitamins-Minerals (ONE-A-DAY MENS 50+ PO) Take 1 tablet by mouth daily             Additional recommendations and follow up:    No discharge procedures on file.    Patient discussed with my supervising physician, Dr. Ku, who has independently confirmed and agrees with the critical findings, assessment, and plan as documented in the note above, or as otherwise noted in their attestation or staff note.      Manjit Kaufman MD, MS  PGY-1, Neurology

## 2022-10-04 NOTE — PROGRESS NOTES
[facility]    Stroke Progress Note    Interval EventsNAOE.  HTN ongoing, into the 150s overnight s/p 1 dose increased coreg @ 12.5 BID, subsequently down-trended, no PRN antihypertensives.  Was hypertensive to 170s prior to administration of AM BP medications, subsequently in 140s, 150s    #Major management changes / active issues today  -Refractory HTN persists   *Renovascular US returned w/o evidence of renal artery stenosis   *Renin/elda ratio pending   *Further tests, referrals pending results  -Plan to monitor BP frequently throughout day.     *If stably <150 SBP, plan to discharge with urgent follow-up as below.   *If HTN persistents >160 SBP, plan to add clonidine 0.1 mg BID, monitor in hospital overnight  -Discharge Planning   *Plan for PCP referral (urgent, w/i the week)   *General Neurology referral in 4-6 weeks   *At home sleep study, concomitant sleep medicine referral   *Plan for DAPT for 21d (last day 10/21) ASA 81 mg for life    HPI Summary  Geronimo Coats is a 62 year old male w/ PMHx refractory HTN, chronic Hep B, and multifocal bilateral infarcts who presented on 10/1/2022 with L hemibody numbness, concerning for stroke     The pt reports that he had been in his normal state of health and was at work the night before (9/30) when he developed sudden onset of an abnormal, numbing sensation in his L arm and leg. However, the patient did not decide to come into the ED until the next day when the symptoms persisted. On arrival in the ED, the pt was afebrile but markedly hypertensive (/114), having not taken his PTA blood pressure meds that day. His numbness was still present, but the remainder of his exam was unremarkable, giving him an NIHSS 1. 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 Given his LKW, the pt was not a TNK candidate and no LVO was noted to  indicated thrombectomy. As it was somewhat unclear whether the pt's current symptoms were the result of an acute stroke vs hypertensive emergency, the pt then underwent an MRI Brain, which returned positive for an acute punctate infarct in the posterior R deneen. Multiple chronic bilateral microhemorrhages were also present suggestive of a hypertensive microangiopathy. By this time, the pt reported that his numbness had largely improved, though some slight asymmetry in sensation was still present, and he was admitted for further stroke work-up     Of note, the pt has medication refractory HTN and is currently on 3 BP meds (Norvasc, Losartan, and Metoprolol per chart review though the pt himself is unsure which medications he is taking). In addition to this, the pt had presented to [facility]'s ED on 6/2/2022 complaining of generalized weakness and dizziness. At that time, his symptoms were thought to be due to elevated BP, but CTH at that time did reveal numerous chronic punctate strokes in the R putamen, L thalamus, bilateral frontoparietal white matter, and the L parietal white matter thought to be embolic in origin. For this, the pt was started on ASA 81mg daily and he was scheduled to see Neurology in Nov 2022    Stroke Evaluation Summarized    MRI/Head CT 1. Punctate acute infarct in the posterior right deneen.  2. Moderate to severe sequelae of chronic small vessel ischemic  disease and multiple old lacunar infarcts.  3. Numerous chronic microhemorrhages in a distribution suggestive of  hypertensive microangiopathy.   Intracranial and Cervical Vasculature 1. Head CTA demonstrates no aneurysm or stenosis of the major intracranial arteries. Anterior predominant cerebral circulation.  2. Neck CTA demonstrates hypoplastic left vertebral and basilar arteries. Minimal to no contrast opacification seen within the V3 segment of the left vertebral artery with distal reconstitution of the V4 segment, likely chronic. No carotid  artery stenosis.         Echocardiogram Left ventricular function is borderline. Biplane LVEF is 56%. No regional wall  motion abnormalities. Mild concentric LVH.  Global right ventricular function is normal.  No significant valve abnormalities.  The inferior vena cava is normal.   EKG/Telemetry No Afib.  Consistent with LVH. Possible lateral ischemia   Other Testing Troponin WNL     LDL  10/2/2022: 61 mg/dL   A1C  10/2/2022: 5.3 %   Troponin No lab value available in past 48 hrs       Impression   62 M PMHx of refractory HTN, chronic HepB, multifocal bilateral micro-hemorrhages p/w R-hemibody numbness c/f stroke w/ punctate infarct of R posterior thomas on MR felt to be 2/2 microvascular angiopathy vs less likely ESUS.    Plan  #Acute ischemic stroke of the Right Posterior Thomas due to small vessel disease vs (less likely) ESUS  #Bilateral Multifocal Chronic Punctate Infarcts  #Bilateral Multifocal Chronic Microhemorrhages suggestive of hypertensive microangiopathy  P/w 1 day of L hemibody numbness, found w/ acute R posterior pontine stroke.  Location, refractory HTN, c/f GARTH (snores heavily) c/w small vessel disease.  However, multifocal b/l strokes raise possibility of ESUS, which is felt to be less likely given location of prior hemorrhages, risk factors as above.    - Admit to Neurology  - Neurochecks Q4h  - As pt's LKW is greater than 24hrs prior, permissive HTN is no longer indicated              - Manage HTN as below   - Avoid hypotonic IV fluids  - Continue ASA 81mg daily (loaded at presentation)  - Continue Plavix 75mg daily (loaded at presentation) for 21d  - Continue atorvastatin 20 mg               - LDL 61  - TTE w/ mild concentric hypertrophy (c/w longstanding HTN), no valvular or atrial abnormalities  - Telemetry, EKG without evidence of arrhythmia so far  - Bedside Glucose Monitoring  - A1c 5.3, troponin WNL   - PT/OT/SLP okay with discharge home  - Stroke Education   *Stroke Class per Patient Learning  Center ([facility])  - Depression Screen  - Apnea Screen  - Euthermia, Euglycemia  - On discharge, may consider              - 30d Cardiac Monitoring              - Sleep Medicine referral     #HTN  #Concern for secondary hypertension  HTN refractory to 3 medications, c/f secondary cause which has not been worked up per chart review, now c/b CNS hemorrhage.   Work-up   - Renovascular US w/o RA stenosis  - renin/elda ratio pending   *Further work-up pending results  Management:  - PTA Norvasc 10 mg daily  - PTA Losartan 100 mg daily  - Continue Coreg to 12.5 mg BID  - PRN Hydralazine/Labetalol for SBP > 180 or DBP > 105  - If remains persistently >160 SBP, plan to add clonidine 0.1 mg BID and observe overnight   *Otherwise, okay to discharge w/ close outpatient follow-up       #Chronic Hepatitis B  C/b hepatic fibrosis, pHTN.  Follows w/ leventhal in GI clinic, last seen 12/2020.  Had recommended Hep D testing, Ab US w/ electrography, but patient was apparently lost to follow-up.   - Have pt follow-up with GI as previously recommended              - Hep D testing outpatient               - Ab US w/ elastography     Diet: Regular Diet w/ thin liquid  VTE PPx: SCDs, ambulatory  Code Status: FULL per discussion w/ patient on 10/1/2022. Also designated his wife, Erick Rudolph, as his medical decision maker should this be needed  Dispo: pending further work-up    Patient Follow-up    - final recommendation pending work-up    We will admit this patient to the Stroke Service.    Patient discussed with my supervising physician, Dr. Ku, who agrees with the critical findings, assessment, and plan as documented in the note above, or as otherwise noted in their attestation or staff note.      Manjit Kaufman MD, MS  PGY-1, Neurology      ______________________________________________________    Clinically Significant Risk Factors Present on Admission                    Medications   Scheduled Meds    amLODIPine  10 mg Oral Daily      aspirin  81 mg Oral Daily    Or     aspirin  81 mg Oral or NG Tube Daily     atorvastatin  20 mg Oral QPM     carvedilol  12.5 mg Oral BID w/meals     clopidogrel  75 mg Oral or NG Tube Daily     losartan  100 mg Oral Daily     sodium chloride (PF)  3 mL Intracatheter Q8H       Infusion Meds    - MEDICATION INSTRUCTIONS -       - MEDICATION INSTRUCTIONS -         PRN Meds  acetaminophen, labetalol **OR** hydrALAZINE, lidocaine 4%, lidocaine (buffered or not buffered), - MEDICATION INSTRUCTIONS -, - MEDICATION INSTRUCTIONS -, melatonin, polyethylene glycol, senna-docusate, sodium chloride (PF)       PHYSICAL EXAMINATION  Temp:  [97.7  F (36.5  C)-98.1  F (36.7  C)] 97.9  F (36.6  C)  Pulse:  [65-70] 69  Resp:  [16-20] 18  BP: (110-183)/() 150/71  SpO2:  [96 %-100 %] 100 %      General: Patient lying in bed, NAD  HEENT: Normocephalic/Atraumatic  Cardio: Appears well-perfused  Pulmonary: No increased work of breathing on RA  Abdomen: Non-distended  Extremities: No edema  Skin: Warm, intact, dry     Neurologic  Mental Status: No aphasia - improved compared to yesterday.  Awake, alert, oriented to person, place, time, and situation. Able to provide an adequate history and follows command w/o issue. Naming is intact.  Cranial Nerves: PERRL, conjugate gaze, EOMI w/o nystagmus, visual fields intact bilaterally, facial sensation intact, no facial asymmetry, hearing intact to conversation, no dysarthria, symmetric palate rise w/ midline uvula, tongue is midline, shoulder shrug 5/5 bilaterally  Motor: No asymmetric disdiadochokinesia - improved compared to yesterday.  Normal bulk and tone. No abnormal movements noted. Strength is 5/5 in all extremities in both proximal and distal muscle groups.    Sensory:  Numbness has now resolved.  Sensation intact to light touch in b/l upper and lower extremities and in V1,2,3 distributions on bilateral face.  No extinction noted  Coordination: FNF and HS grossly intact bilaterally.   No difficulty with HS on L - improved compared to yesterday    Station/Gait: Deferred    Stroke Scales    NIHSS at presentation  1a. Level of Consciousness 0-->Alert, keenly responsive   1b. LOC Questions 0-->Answers both questions correctly   1c. LOC Commands 0-->Performs both tasks correctly   2.   Best Gaze 0-->Normal   3.   Visual 0-->No visual loss   4.   Facial Palsy 0-->Normal symmetrical movements   5a. Motor Arm, Left 0-->No drift, limb holds 90 (or 45) degrees for full 10 secs   5b. Motor Arm, Right 0-->No drift, limb holds 90 (or 45) degrees for full 10 secs   6a. Motor Leg, Left 0-->No drift, leg holds 30 degree position for full 5 secs   6b. Motor Leg, right 0-->No drift, leg holds 30 degree position for full 5 secs   7.   Limb Ataxia 0-->Absent   8.   Sensory 0-->Normal, no sensory loss   9.   Best Language 0-->No aphasia, normal   10. Dysarthria 0-->Normal   11. Extinction and Inattention  0-->No abnormality   Total 0 (10/04/22 0400)       Modified Lilly Score (Pre-morbid)    -  0    Imaging  I personally reviewed all imaging; relevant findings per HPI.     Lab Results Data   CBC  Recent Labs   Lab 10/03/22  0805 10/02/22  0555 10/01/22  1539   WBC 4.1 3.2* 4.0   RBC 5.53 5.00 5.35   HGB 15.9 14.3 15.5   HCT 47.1 42.6 45.5   * 90* 94*     Basic Metabolic Panel    Recent Labs   Lab 10/03/22  1703 10/03/22  0805 10/03/22  0133 10/02/22  2005 10/02/22  1912 10/02/22  1512 10/02/22  0555 10/01/22  2133 10/01/22  1559 10/01/22  1539   NA  --  142  --   --   --   --  143  --   --  142   POTASSIUM 3.7 3.4 3.5   < >  --   --  3.3*   < >  --  3.3*   CHLORIDE  --  108*  --   --   --   --  107  --   --  106   CO2  --  25  --   --   --   --  29  --   --  28   BUN  --  14.2  --   --   --   --  15.8  --   --  17.9   CR  --  1.20*  --   --   --   --  1.24*  --  1.1 1.24*   GLC  --  96  --   --  117* 94 82  --   --  67*   EUGENIO  --  9.1  --   --   --   --  8.9  --   --  9.5    < > = values in this interval not  displayed.     Liver Panel  No results for input(s): PROTTOTAL, ALBUMIN, BILITOTAL, ALKPHOS, AST, ALT, BILIDIRECT in the last 168 hours.  INR    Recent Labs   Lab Test 10/01/22  1539   INR 1.13      Lipid Profile    Recent Labs   Lab Test 10/02/22  0555 05/21/19  1039   CHOL 114 146   HDL 42 48   LDL 61 76   TRIG 53 112     A1C    Recent Labs   Lab Test 10/02/22  0555   A1C 5.3     Troponin    Recent Labs   Lab 10/01/22  1539   CTROPT 14

## 2022-10-04 NOTE — PLAN OF CARE
Status: Pt admitted 10/1 for L side numbness. Found to have acute punctate infarct in the posterior R deneen.  Vitals: HTN 150s within parameters on RA.   Neuros: AOx4. Pt strength 5/5 throughout. Denied N/T. NIHSS score = 0.  IV: PIV SL.   Labs/Electrolytes: Awaiting phos redraws.   Resp/trach: WNL.   Diet: NPO since midnight with small sips of water.   Bowel status: LBM 10/3 per patient.   : Voiding spontaneously.  Skin: Intact.   Pain: Denied.  Activity: Up ad enoch.   Plan: Renal ultrasound today @5952

## 2022-10-04 NOTE — PLAN OF CARE
Status: Pt admitted 10/1 for L side numbness. Found to have acute punctate infarct in the posterior R deneen.  Vitals: HTN, within parameters this shift-no additional meds given. CCM.  Neuros: A&Ox4. Pt denies N/T. Strengths 5/5.  IV: PIV SL  Labs/Electrolytes: WNL  Resp/trach: WNL. On RA.   Diet: Regular diet. Fair po   Bowel status: LBM 10/3 per pt report  : Voiding w/o difficulty via toilet  Skin: intact  Pain: Denies  Activity: Up ad enoch in room  Plan: Continue to monitor and follow POC.   Updates this shift: Continue to monitor Blood pressure. Ultrasound completed this shift.    Stroke Patients:   PLC scheduled or completed: Not scheduled   Pneumoboots in place: No pt refused. Up ad enoch.

## 2022-10-05 ENCOUNTER — APPOINTMENT (OUTPATIENT)
Dept: EDUCATION SERVICES | Facility: CLINIC | Age: 62
DRG: 065 | End: 2022-10-05
Attending: STUDENT IN AN ORGANIZED HEALTH CARE EDUCATION/TRAINING PROGRAM
Payer: COMMERCIAL

## 2022-10-05 VITALS
OXYGEN SATURATION: 99 % | TEMPERATURE: 98.8 F | HEART RATE: 68 BPM | BODY MASS INDEX: 24.91 KG/M2 | SYSTOLIC BLOOD PRESSURE: 137 MMHG | HEIGHT: 66 IN | DIASTOLIC BLOOD PRESSURE: 100 MMHG | WEIGHT: 155 LBS | RESPIRATION RATE: 16 BRPM

## 2022-10-05 PROBLEM — I63.50 RIGHT PONTINE STROKE (H): Status: ACTIVE | Noted: 2022-10-01

## 2022-10-05 PROBLEM — I16.0 HYPERTENSIVE URGENCY: Status: ACTIVE | Noted: 2022-10-05

## 2022-10-05 PROBLEM — G47.33 OSA (OBSTRUCTIVE SLEEP APNEA): Chronic | Status: ACTIVE | Noted: 2022-10-05

## 2022-10-05 PROBLEM — I15.2 HYPERTENSION DUE TO ENDOCRINE DISORDER: Status: ACTIVE | Noted: 2022-10-05

## 2022-10-05 PROBLEM — I16.0 HYPERTENSIVE URGENCY: Status: RESOLVED | Noted: 2022-10-05 | Resolved: 2022-10-05

## 2022-10-05 LAB
ANION GAP SERPL CALCULATED.3IONS-SCNC: 9 MMOL/L (ref 7–15)
BUN SERPL-MCNC: 19 MG/DL (ref 8–23)
CALCIUM SERPL-MCNC: 9 MG/DL (ref 8.8–10.2)
CHLORIDE SERPL-SCNC: 106 MMOL/L (ref 98–107)
CREAT SERPL-MCNC: 1.24 MG/DL (ref 0.67–1.17)
DEPRECATED HCO3 PLAS-SCNC: 24 MMOL/L (ref 22–29)
ERYTHROCYTE [DISTWIDTH] IN BLOOD BY AUTOMATED COUNT: 13.3 % (ref 10–15)
GFR SERPL CREATININE-BSD FRML MDRD: 65 ML/MIN/1.73M2
GLUCOSE SERPL-MCNC: 85 MG/DL (ref 70–99)
HCT VFR BLD AUTO: 43.8 % (ref 40–53)
HGB BLD-MCNC: 15.1 G/DL (ref 13.3–17.7)
MAGNESIUM SERPL-MCNC: 1.8 MG/DL (ref 1.7–2.3)
MCH RBC QN AUTO: 28.6 PG (ref 26.5–33)
MCHC RBC AUTO-ENTMCNC: 34.5 G/DL (ref 31.5–36.5)
MCV RBC AUTO: 83 FL (ref 78–100)
PHOSPHATE SERPL-MCNC: 3 MG/DL (ref 2.5–4.5)
PLATELET # BLD AUTO: 108 10E3/UL (ref 150–450)
POTASSIUM SERPL-SCNC: 3.4 MMOL/L (ref 3.4–5.3)
RBC # BLD AUTO: 5.28 10E6/UL (ref 4.4–5.9)
SODIUM SERPL-SCNC: 139 MMOL/L (ref 136–145)
WBC # BLD AUTO: 3.9 10E3/UL (ref 4–11)

## 2022-10-05 PROCEDURE — 83735 ASSAY OF MAGNESIUM: CPT | Performed by: STUDENT IN AN ORGANIZED HEALTH CARE EDUCATION/TRAINING PROGRAM

## 2022-10-05 PROCEDURE — 84100 ASSAY OF PHOSPHORUS: CPT | Performed by: STUDENT IN AN ORGANIZED HEALTH CARE EDUCATION/TRAINING PROGRAM

## 2022-10-05 PROCEDURE — 80048 BASIC METABOLIC PNL TOTAL CA: CPT | Performed by: STUDENT IN AN ORGANIZED HEALTH CARE EDUCATION/TRAINING PROGRAM

## 2022-10-05 PROCEDURE — 250N000013 HC RX MED GY IP 250 OP 250 PS 637: Performed by: STUDENT IN AN ORGANIZED HEALTH CARE EDUCATION/TRAINING PROGRAM

## 2022-10-05 PROCEDURE — 250N000013 HC RX MED GY IP 250 OP 250 PS 637

## 2022-10-05 PROCEDURE — 36415 COLL VENOUS BLD VENIPUNCTURE: CPT | Performed by: STUDENT IN AN ORGANIZED HEALTH CARE EDUCATION/TRAINING PROGRAM

## 2022-10-05 PROCEDURE — 99239 HOSP IP/OBS DSCHRG MGMT >30: CPT | Mod: GC | Performed by: STUDENT IN AN ORGANIZED HEALTH CARE EDUCATION/TRAINING PROGRAM

## 2022-10-05 PROCEDURE — 250N000011 HC RX IP 250 OP 636: Performed by: STUDENT IN AN ORGANIZED HEALTH CARE EDUCATION/TRAINING PROGRAM

## 2022-10-05 PROCEDURE — 85027 COMPLETE CBC AUTOMATED: CPT | Performed by: STUDENT IN AN ORGANIZED HEALTH CARE EDUCATION/TRAINING PROGRAM

## 2022-10-05 RX ORDER — LOSARTAN POTASSIUM 100 MG/1
100 TABLET ORAL DAILY
Qty: 90 TABLET | Refills: 3 | Status: SHIPPED | OUTPATIENT
Start: 2022-10-05 | End: 2022-10-05

## 2022-10-05 RX ORDER — CARVEDILOL 12.5 MG/1
12.5 TABLET ORAL 2 TIMES DAILY WITH MEALS
Qty: 60 TABLET | Refills: 3 | Status: SHIPPED | OUTPATIENT
Start: 2022-10-05 | End: 2023-03-10

## 2022-10-05 RX ORDER — ATORVASTATIN CALCIUM 20 MG/1
20 TABLET, FILM COATED ORAL EVERY EVENING
Qty: 90 TABLET | Refills: 3 | OUTPATIENT
Start: 2022-10-05 | End: 2024-08-08

## 2022-10-05 RX ORDER — CLOPIDOGREL BISULFATE 75 MG/1
75 TABLET ORAL DAILY
Qty: 16 TABLET | Refills: 0 | Status: SHIPPED | OUTPATIENT
Start: 2022-10-06 | End: 2023-07-24

## 2022-10-05 RX ORDER — CARVEDILOL 12.5 MG/1
12.5 TABLET ORAL 2 TIMES DAILY WITH MEALS
Qty: 90 TABLET | Refills: 3 | OUTPATIENT
Start: 2022-10-05 | End: 2024-08-08

## 2022-10-05 RX ORDER — AMLODIPINE BESYLATE 10 MG/1
10 TABLET ORAL DAILY
Qty: 90 TABLET | Refills: 3 | OUTPATIENT
Start: 2022-10-05 | End: 2024-08-08

## 2022-10-05 RX ORDER — LOSARTAN POTASSIUM 100 MG/1
100 TABLET ORAL DAILY
Qty: 90 TABLET | Refills: 3 | Status: SHIPPED | OUTPATIENT
Start: 2022-10-05 | End: 2023-11-16

## 2022-10-05 RX ORDER — CLOPIDOGREL BISULFATE 75 MG/1
75 TABLET ORAL DAILY
Qty: 17 TABLET | Refills: 0 | OUTPATIENT
Start: 2022-10-05 | End: 2024-08-08

## 2022-10-05 RX ORDER — AMLODIPINE BESYLATE 10 MG/1
10 TABLET ORAL DAILY
Qty: 30 TABLET | Refills: 3 | Status: SHIPPED | OUTPATIENT
Start: 2022-10-06 | End: 2023-03-10

## 2022-10-05 RX ORDER — CLONIDINE HYDROCHLORIDE 0.1 MG/1
0.1 TABLET ORAL 2 TIMES DAILY
Qty: 60 TABLET | Refills: 3 | Status: SHIPPED | OUTPATIENT
Start: 2022-10-05 | End: 2023-03-10

## 2022-10-05 RX ORDER — ATORVASTATIN CALCIUM 20 MG/1
20 TABLET, FILM COATED ORAL EVERY EVENING
Qty: 90 TABLET | Refills: 3 | Status: SHIPPED | OUTPATIENT
Start: 2022-10-05 | End: 2023-11-16

## 2022-10-05 RX ADMIN — ASPIRIN 81 MG 81 MG: 81 TABLET ORAL at 07:50

## 2022-10-05 RX ADMIN — LOSARTAN POTASSIUM 100 MG: 100 TABLET, FILM COATED ORAL at 07:50

## 2022-10-05 RX ADMIN — CARVEDILOL 12.5 MG: 6.25 TABLET, FILM COATED ORAL at 07:50

## 2022-10-05 RX ADMIN — CLOPIDOGREL BISULFATE 75 MG: 75 TABLET ORAL at 07:50

## 2022-10-05 RX ADMIN — CLONIDINE HYDROCHLORIDE 0.1 MG: 0.1 TABLET ORAL at 07:49

## 2022-10-05 RX ADMIN — HEPARIN SODIUM 5000 UNITS: 5000 INJECTION, SOLUTION INTRAVENOUS; SUBCUTANEOUS at 07:49

## 2022-10-05 RX ADMIN — AMLODIPINE BESYLATE 10 MG: 10 TABLET ORAL at 07:49

## 2022-10-05 ASSESSMENT — ACTIVITIES OF DAILY LIVING (ADL)
ADLS_ACUITY_SCORE: 18

## 2022-10-05 NOTE — CONSULTS
Stroke Education Note    The following information has been reviewed with the patient:    1. Warning signs of stroke    2. Calling 911 if having warning signs of stroke    3. All modifiable risk factors: hypertension, CAD, atrial fib, diabetes, hypercholesterolemia, smoking, substance abuse, diet, physical inactivity, obesity, sleep apnea.    4. Patient's risk factors for stroke which include: hypertension    5. Follow-up plan for after discharge    6. Discharge medications which include: Norvasc, losartan, metoprolol, aspirin, Plavix, Lipitor    In addition, the above information was given to the patient in writing as a part of the [facility] Stroke Class Handout.    Learner's response to risk factors / lifestyle modification education: Taking steps     Mary Banuelos RN

## 2022-10-05 NOTE — PLAN OF CARE
Status: admitted on 10/1 for left-sided numbness, found acute ischemic stroke of right posterior deneen and bilateral multifocal chronic infracts.  Vitals: hypertensive within parameters, CCM.  Neuros: AO x4. NIHSS 0.  IV: PIV SL  Labs/Electrolytes: WNL  Resp/trach: WNL, on RA.  Diet: Regular diet  Bowel status: LBM 10/4  : voiding spontaneously   Skin: no deficits noted  Pain: denied  Activity: up independently   Plan: started Clonidine this shift for HTN, continue POC.     Stroke Patients:   PLC scheduled or completed: ordered, not yet completed  Pneumoboots in place: pt refused, up independently

## 2022-10-05 NOTE — PLAN OF CARE
Status: Admitted on 10/1 for left-sided numbness, found acute ischemic stroke of right posterior deneen and bilateral multifocal chronic infracts.  Vitals: HTN within parameters. Keep SBP <180. CCM  Neuros: A&Ox4. NIHSS 0.   IV: PIV SL  Labs/Electrolytes: WNL  Resp/trach: Denies SOB  Diet: Regular diet  Bowel status: LBM 10/4  : voiding spontaneously   Skin: Intact  Pain: denied   Activity: up ab enoch  Plan: Continue to monitor and follow POC.

## 2022-10-05 NOTE — DISCHARGE SUMMARY
Discharge time/date: 10/5/22 1430  Walked or Wheelchair: Wheelchair  PIV removed: Yes  Reviewed AVS with patient: Yes  Medication due times added to AVS in EPIC: Yes  Verbalized understanding of discharge with teachback: Yes  Medications retrieved from pharmacy: Yes  Supplies sent home: N/A  Belongings from security with patient: N/A

## 2022-10-05 NOTE — PLAN OF CARE
Status: admitted on 10/1 for left-sided numbness, found acute ischemic stroke of right posterior deneen and bilateral multifocal chronic infracts.  Vitals: hypertensive within parameters, CCM.  Neuros: A&Ox4. NIHSS 0.  IV: PIV SL  Labs/Electrolytes: WNL  Resp/trach: WNL, on RA.  Diet: Regular diet  Bowel status: LBM 10/4  : voiding spontaneously   Skin: no deficits noted  Pain: denied  Activity: up independently   Plan: Endocrine consult this shift d/t 35.5 aldosterone. Stroke PLC completed. Plan to discharge today. Continue POC.

## 2022-10-07 ENCOUNTER — PATIENT OUTREACH (OUTPATIENT)
Dept: CARE COORDINATION | Facility: CLINIC | Age: 62
End: 2022-10-07

## 2022-10-07 ENCOUNTER — TELEPHONE (OUTPATIENT)
Dept: FAMILY MEDICINE | Facility: CLINIC | Age: 62
End: 2022-10-07

## 2022-10-07 ASSESSMENT — PATIENT HEALTH QUESTIONNAIRE - PHQ9: SUM OF ALL RESPONSES TO PHQ QUESTIONS 1-9: 1

## 2022-10-07 NOTE — TELEPHONE ENCOUNTER
Patient calling regarding scheduling hospital follow up; patient discharged from hospital 10/5/22 and needs PCP follow up.     Soonest available is with  on 10/18. Scheduled appointment with this provider.     Kaylin Crews RN  [facility]

## 2022-10-07 NOTE — PROGRESS NOTES
Connected Care Resource Center Contact  [facility]/Voicemail     Clinical Data: Transitional Care Management Outreach     Outreach attempted x 2.  Left message on patient's voicemail, providing [facility]'s 24/7 scheduling and nurse triage phone number [phone] ([phone]) for questions/concerns and/or to schedule an appt with an [facility] provider, if they do not have a PCP.      Plan:  [facility] will do no further outreaches at this time.       Lilibeth Figueroa  Connected Care Resource Center, [facility]    *Connected Care Resource Team does NOT follow patient ongoing. Referrals are identified based on internal discharge reports and the outreach is to ensure patient has an understanding of their discharge instructions.

## 2022-10-10 LAB — RENIN PLAS-CCNC: 0.3 NG/ML/HR

## 2022-10-11 ENCOUNTER — TELEPHONE (OUTPATIENT)
Dept: FAMILY MEDICINE | Facility: CLINIC | Age: 62
End: 2022-10-11

## 2022-10-11 DIAGNOSIS — E26.9 HYPERALDOSTERONISM (H): Primary | ICD-10-CM

## 2022-10-11 LAB — ALDOST/RENIN PLAS-RTO: 118.3 {RATIO} (ref 0–25)

## 2022-10-11 NOTE — TELEPHONE ENCOUNTER
Spoke with patient. Relayed provider's message as written. Patient expressed concerned regarding financial cost with going to endocrinologist. Writer suggested to patient to reach out to his insurance company prior to booking appointment with endocrinology in regards to financial situation and coverage. Patient verbalized understanding and has no further questions at this time.    Coretta Kyle RN  [facility]

## 2022-10-11 NOTE — TELEPHONE ENCOUNTER
Aldosterone testing is abnormal - call patient with endocrinology referral for follow-up     Madie Webster MD

## 2022-10-17 NOTE — ADDENDUM NOTE
Addended by: BEN FLANAGAN on: 4/23/2019 03:30 PM     Modules accepted: Orders     Include Pregnancy/Lactation Warning?: No

## 2022-10-18 ENCOUNTER — OFFICE VISIT (OUTPATIENT)
Dept: FAMILY MEDICINE | Facility: CLINIC | Age: 62
End: 2022-10-18
Payer: COMMERCIAL

## 2022-10-18 ENCOUNTER — PATIENT OUTREACH (OUTPATIENT)
Dept: CARE COORDINATION | Facility: CLINIC | Age: 62
End: 2022-10-18

## 2022-10-18 VITALS
OXYGEN SATURATION: 100 % | HEART RATE: 82 BPM | TEMPERATURE: 98.7 F | BODY MASS INDEX: 25.82 KG/M2 | WEIGHT: 160 LBS | SYSTOLIC BLOOD PRESSURE: 125 MMHG | DIASTOLIC BLOOD PRESSURE: 82 MMHG

## 2022-10-18 DIAGNOSIS — G47.33 OSA (OBSTRUCTIVE SLEEP APNEA): ICD-10-CM

## 2022-10-18 DIAGNOSIS — I63.50 RIGHT PONTINE STROKE (H): ICD-10-CM

## 2022-10-18 DIAGNOSIS — E26.9 HYPERALDOSTERONISM (H): ICD-10-CM

## 2022-10-18 DIAGNOSIS — I10 PRIMARY HYPERTENSION: Primary | ICD-10-CM

## 2022-10-18 DIAGNOSIS — Z12.11 SCREEN FOR COLON CANCER: ICD-10-CM

## 2022-10-18 LAB
ALBUMIN SERPL-MCNC: 3.7 G/DL (ref 3.4–5)
ALP SERPL-CCNC: 84 U/L (ref 40–150)
ALT SERPL W P-5'-P-CCNC: 46 U/L (ref 0–70)
ANION GAP SERPL CALCULATED.3IONS-SCNC: 4 MMOL/L (ref 3–14)
AST SERPL W P-5'-P-CCNC: 31 U/L (ref 0–45)
BILIRUB SERPL-MCNC: 1 MG/DL (ref 0.2–1.3)
BUN SERPL-MCNC: 16 MG/DL (ref 7–30)
CALCIUM SERPL-MCNC: 9.3 MG/DL (ref 8.5–10.1)
CHLORIDE BLD-SCNC: 111 MMOL/L (ref 94–109)
CO2 SERPL-SCNC: 29 MMOL/L (ref 20–32)
CREAT SERPL-MCNC: 1.21 MG/DL (ref 0.66–1.25)
GFR SERPL CREATININE-BSD FRML MDRD: 67 ML/MIN/1.73M2
GLUCOSE BLD-MCNC: 137 MG/DL (ref 70–99)
POTASSIUM BLD-SCNC: 3.3 MMOL/L (ref 3.4–5.3)
PROT SERPL-MCNC: 7.2 G/DL (ref 6.8–8.8)
SODIUM SERPL-SCNC: 144 MMOL/L (ref 133–144)

## 2022-10-18 PROCEDURE — 90682 RIV4 VACC RECOMBINANT DNA IM: CPT | Performed by: INTERNAL MEDICINE

## 2022-10-18 PROCEDURE — 36415 COLL VENOUS BLD VENIPUNCTURE: CPT | Performed by: INTERNAL MEDICINE

## 2022-10-18 PROCEDURE — 90471 IMMUNIZATION ADMIN: CPT | Performed by: INTERNAL MEDICINE

## 2022-10-18 PROCEDURE — 99214 OFFICE O/P EST MOD 30 MIN: CPT | Mod: 25 | Performed by: INTERNAL MEDICINE

## 2022-10-18 PROCEDURE — 80053 COMPREHEN METABOLIC PANEL: CPT | Performed by: INTERNAL MEDICINE

## 2022-10-18 NOTE — LETTER
October 26, 2022      Geronimo Coats  [id number]  [place] [address]        Dear ,    We are writing to inform you of your test results.  Kidney function has improved and the potassium is mildly low,  however, on current medications, may be best to avoid potassium supplementation and just focus on potassium rich foods.  Resulted Orders   Comprehensive metabolic panel (BMP + Alb, Alk Phos, ALT, AST, Total. Bili, TP)   Result Value Ref Range    Sodium 144 133 - 144 mmol/L    Potassium 3.3 (L) 3.4 - 5.3 mmol/L    Chloride 111 (H) 94 - 109 mmol/L    Carbon Dioxide (CO2) 29 20 - 32 mmol/L    Anion Gap 4 3 - 14 mmol/L    Urea Nitrogen 16 7 - 30 mg/dL    Creatinine 1.21 0.66 - 1.25 mg/dL    Calcium 9.3 8.5 - 10.1 mg/dL    Glucose 137 (H) 70 - 99 mg/dL    Alkaline Phosphatase 84 40 - 150 U/L    AST 31 0 - 45 U/L    ALT 46 0 - 70 U/L    Protein Total 7.2 6.8 - 8.8 g/dL    Albumin 3.7 3.4 - 5.0 g/dL    Bilirubin Total 1.0 0.2 - 1.3 mg/dL    GFR Estimate 67 >60 mL/min/1.73m2      Comment:      Effective December 21, 2021 eGFRcr in adults is calculated using the 2021 CKD-EPI creatinine equation which includes age and gender (Urmila et al., NEJM, DOI: 10.1056/DWKMhv6455750)   If you have any questions or concerns, please call the clinic at the number listed above.       Sincerely,      Michele Saldana MD

## 2022-10-18 NOTE — PROGRESS NOTES
"  Assessment & Plan   Problem List Items Addressed This Visit     GARTH (obstructive sleep apnea) (Chronic)    Relevant Orders    Adult Sleep Eval & Management  Referral    Hyperaldosteronism (H)    Relevant Orders    Comprehensive metabolic panel (BMP + Alb, Alk Phos, ALT, AST, Total. Bili, TP) (Completed)    MR Adrenal wo and w Contrast    Primary Care - Care Coordination Referral    Hypertension - Primary    Relevant Orders    Comprehensive metabolic panel (BMP + Alb, Alk Phos, ALT, AST, Total. Bili, TP) (Completed)    MR Adrenal wo and w Contrast    Primary Care - Care Coordination Referral    Right pontine stroke (H)    Relevant Medications    aspirin (ASA) 81 MG EC tablet    Other Relevant Orders    Primary Care - Care Coordination Referral   Other Visit Diagnoses     Screen for colon cancer             Patient is willing and understands the work up   However, has not been able to owrk and is behind on rent, food and medications            BMI:   Estimated body mass index is 25.82 kg/m  as calculated from the following:    Height as of 10/2/22: 1.676 m (5' 6\").    Weight as of this encounter: 72.6 kg (160 lb).   Weight management plan: Discussed healthy diet and exercise guidelines    Work on weight loss  Regular exercise    Return in about 3 months (around 1/18/2023) for follow up of condition, medication management.    Michele Saldana MD  [facility] SHAREE Melton is a 62 year old, presenting for the following health issues:  Hospital F/U      [facility]       Hospital Follow-up Visit:    Hospital/Nursing Home/IP Rehab Facility: [facility]  Date of Admission: 10/1/22  Date of Discharge: 10/5/22  Reason(s) for Admission: GARTH, Right Pontine Stroke     Was your hospitalization related to COVID-19? 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  Follow up needed: none  Other Healthcare Providers Involved in Patient s Care:         None  Update since discharge: improved.   Post Medication Reconciliation Status:        Plan of care communicated with patient         Review of Systems   Constitutional, HEENT, cardiovascular, pulmonary, gi and gu systems are negative, except as otherwise noted.      Objective    /82   Pulse 82   Temp 98.7  F (37.1  C)   Wt 72.6 kg (160 lb)   SpO2 100%   BMI 25.82 kg/m    Body mass index is 25.82 kg/m .  Physical Exam   GENERAL: healthy, alert and no distress  EYES: Eyes grossly normal to inspection, PERRL and conjunctivae and sclerae normal  HENT: ear canals and TM's normal, nose and mouth without ulcers or lesions  NECK: no adenopathy, no asymmetry, masses, or scars and thyroid normal to palpation  RESP: lungs clear to auscultation - no rales, rhonchi or wheezes  CV: regular rate and rhythm, normal S1 S2, no S3 or S4, no murmur, click or rub, no peripheral edema and peripheral pulses strong  ABDOMEN: soft, nontender, no hepatosplenomegaly, no masses and bowel sounds normal  MS: no gross musculoskeletal defects noted, no edema  SKIN: no suspicious lesions or rashes  NEURO: Normal strength and tone, mentation intact and speech normal  BACK: no CVA tenderness, no paralumbar tenderness  PSYCH: mentation appears normal, affect normal/bright  LYMPH: no cervical, supraclavicular, axillary, or inguinal adenopathy    Results for orders placed or performed in visit on 10/18/22   Comprehensive metabolic panel (BMP + Alb, Alk Phos, ALT, AST, Total. Bili, TP)     Status: Abnormal   Result Value Ref Range    Sodium 144 133 - 144 mmol/L    Potassium 3.3 (L) 3.4 - 5.3  mmol/L    Chloride 111 (H) 94 - 109 mmol/L    Carbon Dioxide (CO2) 29 20 - 32 mmol/L    Anion Gap 4 3 - 14 mmol/L    Urea Nitrogen 16 7 - 30 mg/dL    Creatinine 1.21 0.66 - 1.25 mg/dL    Calcium 9.3 8.5 - 10.1 mg/dL    Glucose 137 (H) 70 - 99 mg/dL    Alkaline Phosphatase 84 40 - 150 U/L    AST 31 0 - 45 U/L    ALT 46 0 - 70 U/L    Protein Total 7.2 6.8 - 8.8 g/dL    Albumin 3.7 3.4 - 5.0 g/dL    Bilirubin Total 1.0 0.2 - 1.3 mg/dL    GFR Estimate 67 >60 mL/min/1.73m2

## 2022-10-19 ENCOUNTER — PATIENT OUTREACH (OUTPATIENT)
Dept: CARE COORDINATION | Facility: CLINIC | Age: 62
End: 2022-10-19

## 2022-10-19 NOTE — PROGRESS NOTES
Community Health Worker Initial Outreach    CHW Initial Information Gathering:  Referral Source: PCP  Preferred Hospital: [facility]  [phone]  Preferred Urgent Care: Other  Current living arrangement:: I live in a private home with spouse  Type of residence:: Apartment  Community Resources: None  Supplies Currently Used at Home: None  Equipment Currently Used at Home: none  Informal Support system:: Spouse  Transportation means:: Other, Public transportation ([employer])  CHW Additional Questions  If ED/Hospital discharge, follow-up appointment scheduled as recommended?: N/A  Medication changes made following ED/Hospital discharge?: N/A  MyChart active?: No  Patient agreeable to assistance with activating MyChart?: No    Patient accepts CC: Yes. Patient scheduled for assessment with CC SW on 10/21 at 2 pm. Patient noted desire to discuss financial assistance. .

## 2022-10-21 ENCOUNTER — PATIENT OUTREACH (OUTPATIENT)
Dept: NURSING | Facility: CLINIC | Age: 62
End: 2022-10-21
Payer: COMMERCIAL

## 2022-10-21 NOTE — LETTER
[facility]  Patient Centered Plan of Care  About Me:        Patient Name:  Geronimo Coats    YOB: 1960  Age:         62 year old   [facility] MRN:    [id number] Telephone Information:  Home Phone [phone]   Mobile [phone]       Address:  [address] [place] [address] Number [address] [place] [address] Email address:  [email or web address]      Emergency Contact(s)    Name Relationship Lgl Grd Work Phone Home Phone Mobile Phone   1. MARIVEL ANDRADE Spouse    [phone]           Primary language:  English     needed? No   [facility] [employer]:  [phone] op. 1  Other communication barriers:Other (new CVA, some difficulty with communication)    Preferred Method of Communication:     Current living arrangement: I live in a private home with spouse    Mobility Status/ Medical Equipment: Independent        Health Maintenance  Health Maintenance Reviewed: Due/Overdue   Health Maintenance Due   Topic Date Due     ZOSTER IMMUNIZATION (1 of 2) Never done     YEARLY PREVENTIVE VISIT  05/21/2020     COLORECTAL CANCER SCREENING  12/04/2021     COVID-19 Vaccine (5 - Booster for Moderna series) 08/02/2022         My Access Plan  Medical Emergency 911   Primary Clinic Line [facility] [phone]   24 Hour Appointment Line [phone] or  [phone]) (toll-free)   24 Hour Nurse Line [phone] (toll-free)   Preferred Urgent Care Other     Preferred [facility]  [phone]     Preferred Pharmacy [id number] [address] AT [facility] [address]     Behavioral Health Crisis Line The National Suicide Prevention Lifeline at 1-885.796.3895 or Text/Call 718             My Care Team Members  Patient Care Team       Relationship Specialty Notifications Start End    Michele Saldana MD PCP - General Internal Medicine - Pediatrics All results, Admissions  10/24/22     Phone: [phone] Fax: [phone] [address] [facility] [address]    Leventhal, Thomas Michael, MD Hepatologist Gastroenterology  12/2/20     Phone: [phone] Fax: [phone]         [address] [facility] [address]    Kentrell Schmid MD MD Neurology  6/17/22     Phone: [phone] Fax: [phone]         [address] [place] [address]    Diana Tripathi, NIKOLAY CNP Assigned PCP   6/25/22     Isabelle Etienne MD MD Endocrinology, Diabetes, and Metabolism  10/5/22     Phone: [phone] Fax: [phone]         [address] [facility] [address] [facility] [address]    Belen Martinez Community Health Worker Primary Care - CC Admissions 10/18/22 10/28/22    Phone: [phone] Fax: [phone]        Tresa Thomas BSW Lead Care Coordinator Primary Care - CC Admissions 10/21/22      Care Coordination [facility]            My Care Plans  Self Management and Treatment Plan  Care Plan  Care Plan: Medication assistance     Problem: Medication affordability     Goal: I will call [employer] Prescription Assistance program to discuss medication assistance     Start Date: 10/21/2022 Expected End Date: 11/21/2022    This Visit's Progress: 0%    Priority: High    Note:     Barriers: Pontine stroke, unable to work for several months, cannot afford medications   Strengths: Has all current medications, has assistance to call [employer] Prescription Assistance program to discuss medication assistance   Patient expressed understanding of goal: yes  Action steps to achieve this goal:  1. I will find time to call [employer] Prescription Assistance  2. I will discuss my current medication needs   3. I will complete application and provide proof of income as required for application                          The # for [employer] Prescription Assistance program is [phone]    Action Plans on File: none                      Advance Care Plans/Directives Type:  none      My Medical and Care Information  Problem List   Patient Active Problem List   Diagnosis     Hepatitis B, chronic (H)     Benign essential hypertension     Thrombocytopenia (H)     Gastroesophageal reflux disease     Hypertension     Right pontine stroke (H)     GARTH (obstructive sleep apnea)     Hypertension due to endocrine disorder     Hyperaldosteronism (H)          Care Coordination Start Date: 10/18/2022   Frequency of Care Coordination: monthly     Form Last Updated: 10/28/2022

## 2022-10-21 NOTE — LETTER
[facility] CARE COORDINATION  [address]  [place] [address]  October 28, 2022    Geronimo Martinsels  [address]  [place] [address]      Dear Geronimo,        I am a clinic care coordinator who works with Michele Saldana MD with the [facility]. I wanted to thank you for spending the time to talk with me.  Below is a description of clinic care coordination and how I can further assist you.       The clinic care coordination team is made up of a registered nurse, , financial resource worker and community health worker who understand the health care system. The goal of clinic care coordination is to help you manage your health and improve access to the health care system. Our team works alongside your provider to assist you in determining your health and social needs. We can help you obtain health care and community resources, providing you with necessary information and education. We can work with you through any barriers and develop a care plan that helps coordinate and strengthen the communication between you and your care team.    Please feel free to contact me with any questions or concerns regarding care coordination and what we can offer.      We are focused on providing you with the highest-quality healthcare experience possible.    Sincerely,     SURJIT Rowland, MSW Clinic   [facility]  Care Coordination  [place] [facility] [facility]   [email or web address]  Office: [phone]  Employed by [employer]       Enclosed: I have enclosed a copy of a 24 Hour Access Plan. This has helpful phone numbers for you to call when needed. Please keep this in an easy to access place to use as needed.

## 2022-10-24 ENCOUNTER — TELEPHONE (OUTPATIENT)
Dept: FAMILY MEDICINE | Facility: CLINIC | Age: 62
End: 2022-10-24

## 2022-10-24 NOTE — TELEPHONE ENCOUNTER
Reason for Call:  Form, our goal is to have forms completed with 72 hours, however, some forms may require a visit or additional information.    Type of letter, form or note:  unemployment    Who is the form from?: Patient    Where did the form come from: Patient or family brought in       What clinic location was the form placed at?: [facility]    Where the form was placed: Provider's Box Box/Folder    What number is listed as a contact on the form?: [phone]       Additional comments:Please call patient and he will  form.    Call taken on 10/24/2022 at 10:49 AM by Florence Parish

## 2022-10-26 NOTE — TELEPHONE ENCOUNTER
Called and spoke with Geronimo and let him know the form was completed and he can pick this up at the .    Form is at the .     Patient wanted to talk with Care Coordination. Sent a message to have someone reach out to him.     Martine Mckay,

## 2022-10-28 ENCOUNTER — PATIENT OUTREACH (OUTPATIENT)
Dept: CARE COORDINATION | Facility: CLINIC | Age: 62
End: 2022-10-28

## 2022-10-28 SDOH — ECONOMIC STABILITY: TRANSPORTATION INSECURITY
IN THE PAST 12 MONTHS, HAS LACK OF TRANSPORTATION KEPT YOU FROM MEETINGS, WORK, OR FROM GETTING THINGS NEEDED FOR DAILY LIVING?: NO

## 2022-10-28 SDOH — ECONOMIC STABILITY: FOOD INSECURITY: WITHIN THE PAST 12 MONTHS, THE FOOD YOU BOUGHT JUST DIDN'T LAST AND YOU DIDN'T HAVE MONEY TO GET MORE.: NEVER TRUE

## 2022-10-28 SDOH — ECONOMIC STABILITY: INCOME INSECURITY: IN THE LAST 12 MONTHS, WAS THERE A TIME WHEN YOU WERE NOT ABLE TO PAY THE MORTGAGE OR RENT ON TIME?: NO

## 2022-10-28 SDOH — HEALTH STABILITY: PHYSICAL HEALTH: ON AVERAGE, HOW MANY DAYS PER WEEK DO YOU ENGAGE IN MODERATE TO STRENUOUS EXERCISE (LIKE A BRISK WALK)?: 0 DAYS

## 2022-10-28 SDOH — HEALTH STABILITY: PHYSICAL HEALTH: ON AVERAGE, HOW MANY MINUTES DO YOU ENGAGE IN EXERCISE AT THIS LEVEL?: 0 MIN

## 2022-10-28 SDOH — ECONOMIC STABILITY: TRANSPORTATION INSECURITY
IN THE PAST 12 MONTHS, HAS THE LACK OF TRANSPORTATION KEPT YOU FROM MEDICAL APPOINTMENTS OR FROM GETTING MEDICATIONS?: NO

## 2022-10-28 SDOH — ECONOMIC STABILITY: FOOD INSECURITY: WITHIN THE PAST 12 MONTHS, YOU WORRIED THAT YOUR FOOD WOULD RUN OUT BEFORE YOU GOT MONEY TO BUY MORE.: NEVER TRUE

## 2022-10-28 ASSESSMENT — LIFESTYLE VARIABLES
HOW MANY STANDARD DRINKS CONTAINING ALCOHOL DO YOU HAVE ON A TYPICAL DAY: PATIENT DOES NOT DRINK
HOW OFTEN DO YOU HAVE A DRINK CONTAINING ALCOHOL: NEVER
SKIP TO QUESTIONS 9-10: 1
HOW OFTEN DO YOU HAVE SIX OR MORE DRINKS ON ONE OCCASION: NEVER
AUDIT-C TOTAL SCORE: 0

## 2022-10-28 ASSESSMENT — SOCIAL DETERMINANTS OF HEALTH (SDOH)
WITHIN THE LAST YEAR, HAVE YOU BEEN KICKED, HIT, SLAPPED, OR OTHERWISE PHYSICALLY HURT BY YOUR PARTNER OR EX-PARTNER?: NO
HOW HARD IS IT FOR YOU TO PAY FOR THE VERY BASICS LIKE FOOD, HOUSING, MEDICAL CARE, AND HEATING?: SOMEWHAT HARD
WITHIN THE LAST YEAR, HAVE TO BEEN RAPED OR FORCED TO HAVE ANY KIND OF SEXUAL ACTIVITY BY YOUR PARTNER OR EX-PARTNER?: NO
HOW OFTEN DO YOU ATTEND CHURCH OR RELIGIOUS SERVICES?: 1 TO 4 TIMES PER YEAR
WITHIN THE LAST YEAR, HAVE YOU BEEN HUMILIATED OR EMOTIONALLY ABUSED IN OTHER WAYS BY YOUR PARTNER OR EX-PARTNER?: NO
HOW OFTEN DO YOU ATTENT MEETINGS OF THE CLUB OR ORGANIZATION YOU BELONG TO?: PATIENT DECLINED
IN A TYPICAL WEEK, HOW MANY TIMES DO YOU TALK ON THE PHONE WITH FAMILY, FRIENDS, OR NEIGHBORS?: MORE THAN THREE TIMES A WEEK
WITHIN THE LAST YEAR, HAVE YOU BEEN AFRAID OF YOUR PARTNER OR EX-PARTNER?: NO
HOW OFTEN DO YOU GET TOGETHER WITH FRIENDS OR RELATIVES?: PATIENT DECLINED
DO YOU BELONG TO ANY CLUBS OR ORGANIZATIONS SUCH AS CHURCH GROUPS UNIONS, FRATERNAL OR ATHLETIC GROUPS, OR SCHOOL GROUPS?: NO

## 2022-10-28 ASSESSMENT — ACTIVITIES OF DAILY LIVING (ADL)
DEPENDENT_IADLS:: TRANSPORTATION;CLEANING;COOKING;LAUNDRY;SHOPPING;MEAL PREPARATION;MEDICATION MANAGEMENT;MONEY MANAGEMENT

## 2022-10-28 NOTE — PROGRESS NOTES
Clinic Care Coordination Contact    Clinic Care Coordination Contact  OUTREACH    Referral Information: initial SW phone assessment    Referral Source: PCP    Primary Diagnosis: Psychosocial    Chief Complaint   Patient presents with     Clinic Care Coordination - Initial     SW        Universal Utilization: Patient was at [facility] 10/1-10/5/2022 for right pontine stroke, hypertensive emergency   Clinic Utilization  Difficulty keeping appointments:: Yes  Compliance Concerns: No  No-Show Concerns: No  No PCP office visit in Past Year: No  Utilization    Hospital Admissions  1             ED Visits  1             No Show Count (past year)  0                Current as of: 10/28/2022 12:16 AM              Clinical Concerns: Per hospital report, patient was persistently hypertensive, requiring several hypertensives and repeat Potassium supplementation.  Per this report, HTN consistent with hyperaldosteronism.  Renal artery US negative for significant stenosis, and renin/aldosterone ratio. Please read hospital notes 10/1/2022 for specifics.  Hospital recommending rapid follow up with PCP and endocrinology for further work up and management, in addition to standard follow up with neurology.  Neurology appt scheduled for 11/16/2022.  Endocrine on 4/10/2023.  Patient started on Atorvastatin.  Hospital care team strongly impressed to patient that if hyperaldosteronism and secondary hypertension from aggressively addressed, he is a very high risk or stroke or death.  Sleep study discussed previously and this appt scheduled for 12/21/2022 to address GARTH.      Patient reported being unable to work and that he cannot afford medications.  He reports he has all medications, these were not reviewed as reviewed in PCP office visit 10/18/2022.  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  He states he will lose insurance at end of the month.  He is planning to apply for unemployment.  KEVIN discussed and  provided the # for [employer] Prescription Assistance program for him to call and discuss interim medication assistance.  SW discussed referral to a Financial Resource Worker (FRW) to assist with Medical Assistance application, he is accepting.  This referral made.  Patient reports his spouse and family are currently caring for him.  He receives assistance with bathing, dressing and is not currently able to drive.  He cannot assist with homemaking needs.  Spouse is setting up and administering medications and doing money management.       Current Medical Concerns:    Patient Active Problem List   Diagnosis     Hepatitis B, chronic (H)     Benign essential hypertension     Thrombocytopenia (H)     Gastroesophageal reflux disease     Hypertension     Right pontine stroke (H)     GARTH (obstructive sleep apnea)     Hypertension due to endocrine disorder     Hyperaldosteronism (H)     Current Behavioral Concerns:none noted     Education Provided to patient: [employer] Prescription Assistance program, FRW referral for MA application   Pain  Pain (GOAL):: No  Health Maintenance Reviewed: Due/Overdue   Health Maintenance Due   Topic Date Due     ZOSTER IMMUNIZATION (1 of 2) Never done     YEARLY PREVENTIVE VISIT  05/21/2020     COLORECTAL CANCER SCREENING  12/04/2021     COVID-19 Vaccine (5 - Booster for Moderna series) 08/02/2022     Clinical Pathway: None    Medication Management:  Medication review status: Medications reviewed and no changes reported per patient.         These were reviewed at office visit 10/18/2022      Functional Status:  Dependent ADL's:: Dressing, bathing, toileting   Dependent IADLs:: Transportation, cleaning, laundry, shopping, meal preparation, money management, medication management   Bed or wheelchair confined:: No  Mobility Status: Independent  Fallen 2 or more times in the past year?: No  Any fall with injury in the past year?: No    Living Situation:  Current living arrangement:: I live in a  private home with spouse  Type of residence:: Apartment    Lifestyle & Psychosocial Needs:    Social Determinants of Health     Tobacco Use: Low Risk      Smoking Tobacco Use: Never     Smokeless Tobacco Use: Never     Passive Exposure: Not on file   Alcohol Use: Not At Risk     Frequency of Alcohol Consumption: Never     Average Number of Drinks: Patient does not drink     Frequency of Binge Drinking: Never   Financial Resource Strain: Medium Risk     Difficulty of Paying Living Expenses: Somewhat hard   Food Insecurity: No Food Insecurity     Worried About Running Out of Food in the Last Year: Never true     Ran Out of Food in the Last Year: Never true   Transportation Needs: No Transportation Needs     Lack of Transportation (Medical): No     Lack of Transportation (Non-Medical): No   Physical Activity: Inactive     Days of Exercise per Week: 0 days     Minutes of Exercise per Session: 0 min   Stress: No Stress Concern Present     Feeling of Stress : Only a little   Social Connections: Moderately Integrated     Frequency of Communication with Friends and Family: More than three times a week     Frequency of Social Gatherings with Friends and Family: Patient refused     Attends Anglican Services: 1 to 4 times per year     Active Member of Clubs or Organizations: No     Attends Club or Organization Meetings: Patient refused     Marital Status:    Intimate Partner Violence: Not At Risk     Fear of Current or Ex-Partner: No     Emotionally Abused: No     Physically Abused: No     Sexually Abused: No   Depression: Not at risk     PHQ-2 Score: 1   Housing Stability: Unknown     Unable to Pay for Housing in the Last Year: No     Number of Places Lived in the Last Year: Not on file     Unstable Housing in the Last Year: No     Diet:: Regular  Inadequate nutrition (GOAL):: No  Tube Feeding: No  Inadequate activity/exercise (GOAL):: No  Significant changes in sleep pattern (GOAL): No  Transportation means:: Other,  Public transportation ([employer])     Confucianist or spiritual beliefs that impact treatment:: No  Mental health DX:: No  Mental health management concern (GOAL):: No  Chemical Dependency Status: No Current Concerns  Chemical Dependency Management: Other (see comment) (N/A)  Informal Support system:: Spouse      Resources and Interventions:  [employer] Prescription Assistance program, FRW referral for MA application.  Many appts upcoming with specialty   Current Resources: none      Community Resources: none   Supplies Currently Used at Home: None  Equipment Currently Used at Home: none  Employment Status: other (see comments) (new CVA, unable to work)         Advance Care Plan/Directive  Advanced Care Plans/Directives on file:: No  Advanced Care Plan/Directive Status: Not Applicable    Referrals Placed: Prescription Assistance Programs, Other (FRW)       Care Plan:  Care Plan: Medication assistance     Problem: Medication affordability     Goal: I will call [employer] Prescription Assistance program to discuss medication assistance     Start Date: 10/21/2022 Expected End Date: 11/21/2022    This Visit's Progress: 0%    Priority: High    Note:     Barriers: Pontine stroke, unable to work for several months, cannot afford medications   Strengths: Has all current medications, has assistance to call [employer] Prescription Assistance program to discuss medication assistance   Patient expressed understanding of goal: yes  Action steps to achieve this goal:  1. I will find time to call [employer] Prescription Assistance  2. I will discuss my current medication needs   3. I will complete application and provide proof of income as required for application                         [facility] Benefits     Is patient requesting help applying for [facility] benefits? Yes   Have you recently applied for any [facility] benefits? No   How many people in your household? 2   Do you buy/eat food together? Yes       Insurance:     [place]? No   Is this an insurance renewal? No   Is this a new insurance application request? Yes   Have you recently applied for insurance? No   How many people in your household?  2   Do you file taxes? Yes           OTHER     Is this a dhiraj care application? No   Any other information for the FRW? Patient had a stroke, he has been unable to work and is behind on rent, will lose insurance on 10/30/2022         Patient/Caregiver understanding: yes    Outreach Frequency: monthly  Future Appointments              In 1 week [facility] NABILA Bhatti    In 2 weeks Kentrell Schmid MD [facility] Neurology Clinic [facility]    In 1 month Benito Hernandez MD [facility] Sleep Clinic [place]  SLEEP    In 5 months Isabelle Etienne MD [facility]          Plan:   1) Patient will call [employer] Prescription Assistance program to discuss medication assistance   2. SW will send introduction letter and Complex care plan   3. SW will call patient in 1 month   4. SW will update PCP     SURJIT Rowland, MSW   [facility]  Care Coordination  [facility]  [phone]  10/28/2022 5:00 PM

## 2022-10-28 NOTE — PROGRESS NOTES
Clinic Care Coordination Contact    Follow Up Progress Note      Assessment: Patient requesting call from CC.  SW returned call, patient informed that he only has 1-2 weeks of medication.  SW again provided the # for [employer] Prescription Assistance program, directed him to call and inform that his insurance ends 10/31/2022.  He may be eligible for [employer] Pharmacy Assistance program and $500 of medications from a [facility] pharmacy. He thanked KEVIN and stated he will call.  FRW referral made for assistance with insurance application on ongoing coverage of medications     Care Gaps: did not address, will on future call     Health Maintenance Due   Topic Date Due     ZOSTER IMMUNIZATION (1 of 2) Never done     YEARLY PREVENTIVE VISIT  05/21/2020     COLORECTAL CANCER SCREENING  12/04/2021     COVID-19 Vaccine (5 - Booster for Moderna series) 08/02/2022       Care Plans  Care Plan: Medication assistance     Problem: Medication affordability     Goal: I will call [employer] Prescription Assistance program to discuss medication assistance     Start Date: 10/21/2022 Expected End Date: 11/21/2022    This Visit's Progress: 0%    Priority: High    Note:     Barriers: Pontine stroke, unable to work for several months, cannot afford medications   Strengths: Has all current medications, has assistance to call [employer] Prescription Assistance program to discuss medication assistance   Patient expressed understanding of goal: yes  Action steps to achieve this goal:  1. I will find time to call [employer] Prescription Assistance  2. I will discuss my current medication needs   3. I will complete application and provide proof of income as required for application                         Intervention/Education provided during outreach: [employer] Prescription Assistance program       Plan:     Care Coordinator will follow in 2-3 weeks     Tresa Thomas, DEUCEW, MSW   [facility]  Care Coordination  [facility] [place]  [facility]  [phone]  10/28/2022 5:12 PM

## 2022-11-01 ENCOUNTER — PATIENT OUTREACH (OUTPATIENT)
Dept: CARE COORDINATION | Facility: CLINIC | Age: 62
End: 2022-11-01

## 2022-11-01 NOTE — PROGRESS NOTES
Clinic Care Coordination Contact  Program: Health Insurance/County Benefits   County:  [facility] Case #:  County Worker:   Lyric #:   Subscriber #:   Renewal:  Date Applied:     FRW Outreach:   11/1/22: Outreach attempted x 1. Left message on voicemail with call back information and requested return call.  Plan: FRW will call again within one week.     Health Insurance:      Referral/Screening:  [facility] Benefits   Is patient requesting help applying for [facility] benefits? Yes       Have you recently applied for any [facility] benefits? No       How many people in your household? 2       Do you buy/eat food together? Yes       [place]   [place]       Is this an insurance renewal? No       Is this a new insurance application request? Yes       Have you recently applied for insurance? No       How many people in your household?  2       Do you file taxes? Yes       OTHER   Is this a dhiraj care application? No       Any other information for the FRW? Patient had a stroke, he has been unable to work and is behind on rent, will lose insurance on 10/30/2022

## 2022-11-08 ENCOUNTER — PATIENT OUTREACH (OUTPATIENT)
Dept: CARE COORDINATION | Facility: CLINIC | Age: 62
End: 2022-11-08

## 2022-11-08 NOTE — PROGRESS NOTES
Clinic Care Coordination Contact  Program: Health Insurance/County Benefits   County:  [facility] Case #:  [facility] Worker:   Lyric #:   Subscriber #:   Renewal:  Date Applied:     FRW Outreach:   11/8/22: FRW called patient and spoke with spouse. Appointment made for 11/18.  11/1/22: Outreach attempted x 1. Left message on voicemail with call back information and requested return call.  Plan: FRW will call again within one week.     Health Insurance:      Referral/Screening:  [facility] Benefits   Is patient requesting help applying for [facility] benefits? Yes       Have you recently applied for any [facility] benefits? No       How many people in your household? 2       Do you buy/eat food together? Yes       [place]   [place]       Is this an insurance renewal? No       Is this a new insurance application request? Yes       Have you recently applied for insurance? No       How many people in your household?  2       Do you file taxes? Yes       OTHER   Is this a dhiraj care application? No       Any other information for the FRW? Patient had a stroke, he has been unable to work and is behind on rent, will lose insurance on 10/30/2022

## 2022-11-16 ENCOUNTER — PRE VISIT (OUTPATIENT)
Dept: NEUROLOGY | Facility: CLINIC | Age: 62
End: 2022-11-16

## 2022-11-18 ENCOUNTER — PATIENT OUTREACH (OUTPATIENT)
Dept: CARE COORDINATION | Facility: CLINIC | Age: 62
End: 2022-11-18

## 2022-11-18 ENCOUNTER — APPOINTMENT (OUTPATIENT)
Dept: CARE COORDINATION | Facility: CLINIC | Age: 62
End: 2022-11-18
Payer: COMMERCIAL

## 2022-11-18 NOTE — PROGRESS NOTES
Clinic Care Coordination Contact  Program: Health Insurance/County Benefits   County:  [facility] Case #:  [facility] Worker:   Lyric #:   Subscriber #:   Renewal:  Date Applied:     FRW Outreach:   11/18/22: FRW called pt and left VM. FRW will make another outreach in 1-2 weeks.   11/8/22: FRW called patient and spoke with spouse. Appointment made for 11/18.  11/1/22: Outreach attempted x 1. Left message on voicemail with call back information and requested return call.  Plan: FRW will call again within one week.     Health Insurance:      Referral/Screening:  [facility] Benefits   Is patient requesting help applying for [facility] benefits? Yes       Have you recently applied for any [facility] benefits? No       How many people in your household? 2       Do you buy/eat food together? Yes       [place]   [place]       Is this an insurance renewal? No       Is this a new insurance application request? Yes       Have you recently applied for insurance? No       How many people in your household?  2       Do you file taxes? Yes       OTHER   Is this a dhiraj care application? No       Any other information for the FRW? Patient had a stroke, he has been unable to work and is behind on rent, will lose insurance on 10/30/2022

## 2022-11-21 ENCOUNTER — PATIENT OUTREACH (OUTPATIENT)
Dept: CARE COORDINATION | Facility: CLINIC | Age: 62
End: 2022-11-21

## 2022-11-21 NOTE — PROGRESS NOTES
Clinic Care Coordination Contact  [facility]/Voicemail       Clinical Data: Care Coordinator Outreach.  Patient is enrolled.  FRW attempting to reach patient to discuss insurance needs  Outreach attempted x 1.  Left message on patient's voicemail with call back information and requested return call.  Plan: Care Coordinator will try to reach patient again in 3-5 business days.    SURJIT Rowland, MSW   [facility]  Care Coordination  [facility]  [phone]  11/21/2022 2:04 PM

## 2022-11-22 ENCOUNTER — OFFICE VISIT (OUTPATIENT)
Dept: FAMILY MEDICINE | Facility: CLINIC | Age: 62
End: 2022-11-22
Payer: COMMERCIAL

## 2022-11-22 VITALS
TEMPERATURE: 99.2 F | HEART RATE: 84 BPM | OXYGEN SATURATION: 98 % | SYSTOLIC BLOOD PRESSURE: 122 MMHG | DIASTOLIC BLOOD PRESSURE: 78 MMHG | WEIGHT: 164 LBS | BODY MASS INDEX: 26.47 KG/M2

## 2022-11-22 DIAGNOSIS — I63.50 RIGHT PONTINE STROKE (H): Primary | ICD-10-CM

## 2022-11-22 DIAGNOSIS — Z12.11 SCREEN FOR COLON CANCER: ICD-10-CM

## 2022-11-22 DIAGNOSIS — I10 PRIMARY HYPERTENSION: ICD-10-CM

## 2022-11-22 PROCEDURE — 96127 BRIEF EMOTIONAL/BEHAV ASSMT: CPT | Performed by: INTERNAL MEDICINE

## 2022-11-22 PROCEDURE — 99214 OFFICE O/P EST MOD 30 MIN: CPT | Performed by: INTERNAL MEDICINE

## 2022-11-22 ASSESSMENT — PATIENT HEALTH QUESTIONNAIRE - PHQ9
SUM OF ALL RESPONSES TO PHQ QUESTIONS 1-9: 1
10. IF YOU CHECKED OFF ANY PROBLEMS, HOW DIFFICULT HAVE THESE PROBLEMS MADE IT FOR YOU TO DO YOUR WORK, TAKE CARE OF THINGS AT HOME, OR GET ALONG WITH OTHER PEOPLE: SOMEWHAT DIFFICULT
SUM OF ALL RESPONSES TO PHQ QUESTIONS 1-9: 1

## 2022-11-22 NOTE — PROGRESS NOTES
Assessment & Plan   Problem List Items Addressed This Visit     Hypertension    Right pontine stroke (H) - Primary   Other Visit Diagnoses     Screen for colon cancer                 Patient still with mild slurring in speech . However balance and gait are improved   And patient having some limitations in lifting .    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    Michele Saldana MD  [facility] [place] is a 62 year old, presenting for the following health issues:  Depression      History of Present Illness       Mental Health Follow-up:  Patient presents to follow-up on Depression.Patient's depression since last visit has been:  No change  The patient is not having other symptoms associated with depression.      Any significant life events: No  Patient is not feeling anxious or having panic attacks.  Patient has no concerns about alcohol or drug use.    He eats 2-3 servings of fruits and vegetables daily.He consumes 0 sweetened beverage(s) daily.He exercises with enough effort to increase his heart rate 20 to 29 minutes per day.  He exercises with enough effort to increase his heart rate 3 or less days per week.   He is taking medications regularly.    Today's PHQ-9         PHQ-9 Total Score: 1    PHQ-9 Q9 Thoughts of better off dead/self-harm past 2 weeks :   Not at all    How difficult have these problems made it for you to do your work, take care of things at home, or get along with other people: Somewhat difficult           Review of Systems   Constitutional, HEENT, cardiovascular, pulmonary, gi and gu systems are negative, except as otherwise noted.      Objective    /78 (BP Location: Right arm, Patient Position: Chair, Cuff Size: Adult Regular)    Pulse 84   Temp 99.2  F (37.3  C)   Wt 74.4 kg (164 lb)   SpO2 98%   BMI 26.47 kg/m    Body mass index is 26.47 kg/m .  Physical Exam   GENERAL: healthy, alert and no distress  EYES: Eyes grossly normal to inspection, PERRL and conjunctivae and sclerae normal  HENT: ear canals and TM's normal, nose and mouth without ulcers or lesions  NECK: no adenopathy, no asymmetry, masses, or scars and thyroid normal to palpation  RESP: lungs clear to auscultation - no rales, rhonchi or wheezes  CV: regular rate and rhythm, normal S1 S2, no S3 or S4, no murmur, click or rub, no peripheral edema and peripheral pulses strong  ABDOMEN: soft, nontender, no hepatosplenomegaly, no masses and bowel sounds normal  MS: no gross musculoskeletal defects noted, no edema  SKIN: no suspicious lesions or rashes  NEURO: Normal strength and tone, mentation intact and speech normal  BACK: no CVA tenderness, no paralumbar tenderness    No results found for any visits on 11/22/22.

## 2022-11-22 NOTE — LETTER
[facility]  [address]  [place] [address]  Phone: [phone]    November 22, 2022        Geronimo Coats  [address]  [place] [address]          To whom it may concern:    RE: Geronimo Coats    Patient may return to work 11/28/2022 with the following:  Light duty-unable to lift more than 5 pounds.  Patient should start with 6 hours shifts in a 24 hour period 5 days a week  These are in effect until 5/1/2023.    Please contact me for questions or concerns.      Sincerely,        Michele Saldana MD

## 2022-11-23 NOTE — PROGRESS NOTES
Clinic Care Coordination Contact  [facility]/Voicemail       Clinical Data: Care Coordinator Outreach  Outreach attempted x 2 today, it seems patient's phone is not working.  When it is answered, it seems someone is there but SW cannot hear them.  Will try again next week  Plan: Care Coordinator will try to reach patient again in 3-5 business days.    SURJIT Rowland, MSW   [facility]  Care Coordination  [facility]  [phone]  11/23/2022 1:41 PM

## 2022-12-01 ENCOUNTER — PATIENT OUTREACH (OUTPATIENT)
Dept: CARE COORDINATION | Facility: CLINIC | Age: 62
End: 2022-12-01

## 2022-12-01 NOTE — PROGRESS NOTES
Clinic Care Coordination Contact  Program: Health Insurance/County Benefits   County:  [facility] Case #:  [facility] Worker:   Lyric #:   Subscriber #:   Renewal:  Date Applied:     FRW Outreach:   12/1/22: FRW called pt and left VM. FRW will make another outreach in 1-2 weeks.   11/18/22: FRW called pt and left VM. FRW will make another outreach in 1-2 weeks.   11/8/22: FRW called patient and spoke with spouse. Appointment made for 11/18.  11/1/22: Outreach attempted x 1. Left message on voicemail with call back information and requested return call.  Plan: FRW will call again within one week.     Health Insurance:      Referral/Screening:  [facility] Benefits   Is patient requesting help applying for [facility] benefits? Yes       Have you recently applied for any [facility] benefits? No       How many people in your household? 2       Do you buy/eat food together? Yes       [place]:   [place]       Is this an insurance renewal? No       Is this a new insurance application request? Yes       Have you recently applied for insurance? No       How many people in your household?  2       Do you file taxes? Yes       OTHER   Is this a dhiraj care application? No       Any other information for the FRW? Patient had a stroke, he has been unable to work and is behind on rent, will lose insurance on 10/30/2022

## 2022-12-01 NOTE — PROGRESS NOTES
Clinic Care Coordination Contact    Situation: Patient chart reviewed by care coordinator.    Background: Patient is enrolled    Assessment: FRW attempting to reach patient, left message today with plan to call again in 1-2 weeks.  SW noted in chart patient was seen by PCP 11/22/2022 and approved for return to work on light duty, will be reevaluated in 3 months.  Patient stated need to return to work as food and housing are a concern with unemployment.  Office note indicated that patient's gait and balance have improved, mild slurred speech still present     Plan/Recommendations: SW will review if patient has called FRW in 1 week, SW will call patient if they have not connected for update     SURJIT Rowland, MSW   [facility]  Care Coordination  [facility]  [phone]  12/1/2022 3:14 PM

## 2022-12-08 ENCOUNTER — PATIENT OUTREACH (OUTPATIENT)
Dept: CARE COORDINATION | Facility: CLINIC | Age: 62
End: 2022-12-08

## 2022-12-08 NOTE — PROGRESS NOTES
Clinic Care Coordination Contact  [facility]/Voicemail       Clinical Data: Care Coordinator Outreach.  Patient is enrolled, FRW attempting to reach patient for update and assist with insurance application   Outreach attempted x 1.  Left message on patient's voicemail with call back information and requested return call.  Plan: Care Coordinator will try to reach review chart in 1-2 weeks     SURJIT Rowland, MSW   [facility]  Care Coordination  [facility]  [phone]  12/8/2022 1:29 PM

## 2022-12-13 ENCOUNTER — PATIENT OUTREACH (OUTPATIENT)
Dept: CARE COORDINATION | Facility: CLINIC | Age: 62
End: 2022-12-13

## 2022-12-13 NOTE — PROGRESS NOTES
Clinic Care Coordination Contact  Program: Health Insurance/County Benefits   County:  [facility] Case #:  [facility] Worker:   Lyric #:   Subscriber #:   Renewal:  Date Applied:     FRW Outreach:   12/13/22: FRW called pt for final attempt. Please send new referral.  12/1/22: FRW called pt and left VM. FRW will make another outreach in 1-2 weeks.   11/18/22: FRW called pt and left VM. FRW will make another outreach in 1-2 weeks.   11/8/22: FRW called patient and spoke with spouse. Appointment made for 11/18.  11/1/22: Outreach attempted x 1. Left message on [employer] with call back information and requested return call.  Plan: FRW will call again within one week.     Health Insurance:      Referral/Screening:  [facility] Benefits   Is patient requesting help applying for [facility] benefits? Yes       Have you recently applied for any [facility] benefits? No       How many people in your household? 2       Do you buy/eat food together? Yes       [place]:   [place]       Is this an insurance renewal? No       Is this a new insurance application request? Yes       Have you recently applied for insurance? No       How many people in your household?  2       Do you file taxes? Yes       OTHER   Is this a dhiraj care application? No       Any other information for the FRW? Patient had a stroke, he has been unable to work and is behind on rent, will lose insurance on 10/30/2022

## 2022-12-14 ENCOUNTER — PATIENT OUTREACH (OUTPATIENT)
Dept: CARE COORDINATION | Facility: CLINIC | Age: 62
End: 2022-12-14

## 2022-12-14 NOTE — PROGRESS NOTES
Clinic Care Coordination Contact  [facility]        Clinical Data: Care Coordinator Outreach.  Patient is enrolled.  FRW has been attempting to assist patient with application for insurance.  She cannot reach him for follow up.  KEVIN called to assess needs before closing, SW has been unable to reach patient in the past and could not today for update    Outreach attempted x 1.  VM full   Plan: Care Coordinator will send disenrollment letter with care coordinator contact information via mail. Care Coordinator will do no further outreaches at this time.  KEVIN will update PCP    Tresa Thomas, SURJIT, MSW   [facility]  Care Coordination  [facility]  [phone]  12/14/2022 3:22 PM

## 2022-12-14 NOTE — LETTER
[facility] CARE COORDINATION  [address]  [place] [address]    December 14, 2022    Geronimo Coats  [address]  [place] [address]      Dear Geronimo,    I have been unsuccessful in reaching you since our last contact. At this time the Care Coordination team will make no further attempts to reach you, however this does not change your ability to continue receiving care from your providers at your primary care clinic. If you need additional support from a care coordinator in the future please contact Tresa Thomas at [phone]    All of us at [facility] are invested in your health and are here to assist you in meeting your goals.     Sincerely,    Tresa Thomas, DEUCEW, MSW Clinic   [facility]  Care [facility]   [email or web address]  Office: [phone]  Employed by [facility] [employer]

## 2022-12-20 PROBLEM — G47.33 OSA (OBSTRUCTIVE SLEEP APNEA): Status: ACTIVE | Noted: 2022-10-05

## 2022-12-20 PROBLEM — Z86.73 HISTORY OF CVA (CEREBROVASCULAR ACCIDENT): Chronic | Status: ACTIVE | Noted: 2022-10-01

## 2022-12-21 ENCOUNTER — OFFICE VISIT (OUTPATIENT)
Dept: SLEEP MEDICINE | Facility: CLINIC | Age: 62
End: 2022-12-21
Payer: COMMERCIAL

## 2022-12-21 VITALS
WEIGHT: 165.8 LBS | DIASTOLIC BLOOD PRESSURE: 74 MMHG | RESPIRATION RATE: 16 BRPM | BODY MASS INDEX: 26.65 KG/M2 | SYSTOLIC BLOOD PRESSURE: 115 MMHG | HEIGHT: 66 IN | OXYGEN SATURATION: 99 % | HEART RATE: 71 BPM

## 2022-12-21 DIAGNOSIS — Z86.73 HISTORY OF CVA (CEREBROVASCULAR ACCIDENT): Chronic | ICD-10-CM

## 2022-12-21 DIAGNOSIS — I10 PRIMARY HYPERTENSION: ICD-10-CM

## 2022-12-21 DIAGNOSIS — R06.89 DYSPNEA AND RESPIRATORY ABNORMALITY: Primary | ICD-10-CM

## 2022-12-21 DIAGNOSIS — R06.00 DYSPNEA AND RESPIRATORY ABNORMALITY: Primary | ICD-10-CM

## 2022-12-21 DIAGNOSIS — G47.10 ORGANIC HYPERSOMNIA: ICD-10-CM

## 2022-12-21 DIAGNOSIS — F51.04 CHRONIC INSOMNIA: ICD-10-CM

## 2022-12-21 DIAGNOSIS — R53.81 MALAISE AND FATIGUE: ICD-10-CM

## 2022-12-21 DIAGNOSIS — R53.83 MALAISE AND FATIGUE: ICD-10-CM

## 2022-12-21 PROBLEM — D69.6 THROMBOCYTOPENIA (H): Chronic | Status: ACTIVE | Noted: 2022-04-08

## 2022-12-21 PROCEDURE — 99244 OFF/OP CNSLTJ NEW/EST MOD 40: CPT | Performed by: INTERNAL MEDICINE

## 2022-12-21 RX ORDER — ZOLPIDEM TARTRATE 5 MG/1
TABLET ORAL
Qty: 1 TABLET | Refills: 0 | Status: SHIPPED | OUTPATIENT
Start: 2022-12-21 | End: 2023-07-24

## 2022-12-21 ASSESSMENT — SLEEP AND FATIGUE QUESTIONNAIRES
HOW LIKELY ARE YOU TO NOD OFF OR FALL ASLEEP WHILE SITTING AND TALKING TO SOMEONE: WOULD NEVER DOZE
HOW LIKELY ARE YOU TO NOD OFF OR FALL ASLEEP WHILE LYING DOWN TO REST IN THE AFTERNOON WHEN CIRCUMSTANCES PERMIT: MODERATE CHANCE OF DOZING
HOW LIKELY ARE YOU TO NOD OFF OR FALL ASLEEP WHILE SITTING QUIETLY AFTER LUNCH WITHOUT ALCOHOL: MODERATE CHANCE OF DOZING
HOW LIKELY ARE YOU TO NOD OFF OR FALL ASLEEP IN A CAR, WHILE STOPPED FOR A FEW MINUTES IN TRAFFIC: MODERATE CHANCE OF DOZING
HOW LIKELY ARE YOU TO NOD OFF OR FALL ASLEEP WHEN YOU ARE A PASSENGER IN A CAR FOR AN HOUR WITHOUT A BREAK: MODERATE CHANCE OF DOZING
HOW LIKELY ARE YOU TO NOD OFF OR FALL ASLEEP WHILE SITTING INACTIVE IN A PUBLIC PLACE: MODERATE CHANCE OF DOZING
HOW LIKELY ARE YOU TO NOD OFF OR FALL ASLEEP WHILE SITTING AND READING: WOULD NEVER DOZE
HOW LIKELY ARE YOU TO NOD OFF OR FALL ASLEEP WHILE WATCHING TV: HIGH CHANCE OF DOZING

## 2022-12-21 NOTE — PATIENT INSTRUCTIONS
Insomnia and Behavioral Sleep Medicine Program    The [facility] Insomnia and Behavioral Sleep Medicine Program provides non-drug treatment for sleep problems including:    Cognitive-behavioral Therapies for Insomnia (CBT-I)  Management of Shift-work and Jet Lag  Management of Delayed, Advanced and Irregular Circadian Rhythm Sleep Disorders  Imagery Rehearsal Therapy (IRT) for Nightmare Disorder  PAP Therapy Desensitization    You have been referred for consultation with a sleep psychologist who specializes in behavioral sleep medicine and treatment of insomnia.  The [facility] Insomnia and Behavioral Sleep Medicine Program offers individualized telehealth services through our [facility] and online CBT-I.    Preparing for your Consultation    You will need to keep a Sleep Diary for at least a week prior to your visit. Complete the sleep diary each day first thing after you get up by answering a few key questions about your sleep using our convenient mobile natanael or paper sleep diary.  Your answers should be based on your recall of the past 24 hours.  Avoid watching the clock or recording data during the night.     Insomnia  Natanael    The Insomnia  mobile natanael  is a convenient way to keep track of your sleep prior to your sleep consultation.  Simply download the free natanael on your Apple or Android phone and record your information each morning.  The natanael includes training, self-assessment, and sleep schedule recommendations.  Prior to your consultation we recommend you use only the sleep diary function. You can e-mail yourself a copy of your sleep diary data by going to the Settings section and using the [place] User Data function.  During your consultation your provider will review the data with you.          [facility] Sleep Diary    You can also track your sleep using the [facility] paper sleep diary.  You can upload your sleep diary and send it via a NaHere  message, fax it to [phone], or have it with you at the time of your consultation.            CBT-I:  Frequently Asked Questions    What is CBT-I?    Cognitive Behavioral Therapy for Insomnia, also known as CBT-I, is a highly effective non-drug treatment for insomnia. The American College of Physicians recommends CBT-I as the first treatment for chronic insomnia.  Research has shown CBT-I to be safer and more effective long term than sleeping pills.    What does CBT-I involve?     CBT-I targets behaviors that lead to chronic insomnia:  Habits that weaken the bed as a cue for sleep  Habits that weaken your body's sleep drive and sleep/wake clock   Unhelpful sleep thoughts that increase sleep-related worry and arousal.    The process involves 3-6 telehealth visits that guide you to implement proven strategies to get a better night's sleep.    People often see improvement in their sleep within a few weeks. Research shows if you keep practicing the skills you learn your sleep is likely to continue to improve 6-12 months after treatment.    Does this program prescribe or manage sleep medication?    No.  Your prescribing provider is responsible to assist you in managing your sleep medications.  Some people choose to stop using sleep medication prior to or during CBT-I.  Our program can work with your prescribing provider to help reduce or eliminate use of sleep medications.     Getting Started Today!    If you haven't already done so, we recommend you consider making the following changes to your sleep habits prior to your sleep consultation:     Reduce your consumption of caffeine and alcohol.  Both can disrupt sleep and make strengthening your sleep more difficult.  Specifically:    - Avoid caffeine within 6 hours of bedtime   - No more than 3 caffeinated beverages per day (e.g. 8 oz. cup coffee or 12 oz. cup soda)            - No alcohol within 3 hours of bedtime    Make sure your bedroom is quiet, comfortable and  dark.  Noise, light and an uncomfortable sleep space can harm your sleep.      Keep the same sleep schedule 7 days a week.unless you do shift work.      Online CBT-I     If you want to get started today, research indicates that online CBT-I can be effective for some individuals. These programs requires comfort with reg-based or online learning.  However, digital CBT-I programs are not for everyone.  Contraindications include:    Seizure disorders,   Bipolar disorder,   Unstable medical or mental health conditions,   Frailty or risk of falling  Pregnancy    You should consult a sleep specialist before using these resources if you have:    Sleep Apnea  Restless Leg Syndrome  Sleep Walking  REM behavior disorder  Night Terrors  Excessive Daytime Sleepiness  Are engaged in shift work  Use prescription sleep medication    Our Online CBT-I program    If your sleep provider recommends online CBT-I for you , the cost for an entire 6-week program is $40.    To get started, copy and paste the link below which will take you to the landing page to register:                           [email or web address]               If you wish to complete the online CBT-I program but do not plan to follow-up with a sleep provider, you are set to begin the program.    If you are planning to work with an [facility] sleep provider, there are a couple of extra steps you can take to share your sleep data with your sleep provider.  To share sleep log data, go to the left side navigation and click on the  share sleep log  button:         You will be taken to the page below where you will enter  the provider code [email or web address] into the box.          Once you press the locate button, the information for [facility] will pop up as below.  By pressing the Submit button your data will be sent to our  secure [facility] T-Quad 22 sleep program portal for review by your sleep provider. You will only need to do this step once.                                   Self-help Workbooks for Insomnia    If you have found self-help books useful in the past, you may want to consider reading one of the following books prior to your consultation:    Say Shreyas to Insomnia: The Six-Week, Drug-Free Program Developed at [facility].  Prakash Maguire MD. Available in paperback, De, and audiobook. ####    Overcoming Insomnia: A Cognitive-Behavioral Therapy Approach, Workbook.  Ray May, PhD  and Becky Conrad, PhD.  Available in paperback and De.    Quiet Your Mind and Get to Sleep: Solutions to Insomnia for Those with Depression, Anxiety, or Chronic Pain.  Cynthia Yoo, PhD and Becky Conrad, PhD.  Available in paperback and De

## 2022-12-21 NOTE — PROGRESS NOTES
Outpatient Sleep Medicine Consultation:      Name: Geronimo Coats MRN# [id number]   Age: 62 year old YOB: 1960     Date of Consultation: December 21, 2022  Consultation is requested by: Imer Kaufman MD  [address] [place] [id number] Imer Kaufman  Primary care provider: Michele Saldana       Reason for Sleep Consult:     Geronimo Coats is sent by Imer Kaufman for a sleep consultation regarding possible obstructive sleep apnea .    Chief Complaint   Patient presents with     Sleep Problem     Hypertension due to endocrine disorder [I15.2] Primary hypertension [I10] GARTH (obstructive sleep apnea) [G47.33]             Assessment and Plan:     Loud socially disrupptive snoring, fatigue/excessive daytime sleepiness (ESS 13), sleep maintenance difficulties, nocturia, heartburn at night. Comorbid resistant hypertension, history of cerebrovascular accident   - Polysomnogram (using 4% desaturation/Medicare/ AASM 1B scoring rules) for high probability obstructive sleep apnea.  AMbien if needed.     Sleep onset, maintenance difficulties (MARGARET 19)  Suspect psychophysiologic insomnia complicated by probable sleep disordered breathing, poor sleep hygiene. We discussed stimulus control, sleep restriction and regular wake times.   - Referral for cognitive behavioral training   - See patient instructions     Resistant hypertension   Probable hyperaldosternism  - Consider trial of treatment with an aldosterone antagonist such as aldactone      Summary Counseling:    Sleep Testing Reviewed  Obstructive Sleep Apnea Reviewed  Complications of Untreated Sleep Apnea Reviewed      I spent 35 minutes with patient including counseling, and 15 minutes with chart review, and documentation     CC: Imer Kaufman          History of Present Illness:     Patient is a vague historian, and accent makes it difficult for me    SLEEP-WAKE SCHEDULE:     Work/School Days: Patient goes to school/work:    Disabled   Usually gets into bed at  9   Takes patient about  10-60 to fall asleep  Has trouble falling asleep  3-4 nights per week due to unclear reasons, ? Thinking  This has been a problem for 30 years  Wakes up in the middle of the night   4-5 times.  Wakes up due to Snorting self awake;Use the bathroom  He has trouble falling back asleep  2-3 times a week due to an over-active mind    Patient is usually up at  7-8  Uses alarm: Yes    Weekends/Non-work Days/All Other Days:  Usually gets into bed at 9pm   Takes patient about 30 min to 1 hour to fall asleep  Patient is usually up at 7-8  Uses alarm: No    Sleep Need  Patient gets  3 to 4 hours sleep on average   Patient thinks he needs about 7 to 8 hours sleep    Geronimo SAMMY Coats prefers to sleep in this position(s): Back;Stomach   Patient states they do the following activities in bed: Watch TV;Use phone, computer, or tablet     Naps  Patient takes a purposeful nap Rare times a week and naps are usually 1 to 2 hours in duration  He feels better after a nap: Yes  He dozes off unintentionally 2-3 days per week  Patient has had a driving accident or near-miss due to sleepiness/drowsiness: No      SLEEP DISRUPTIONS:    Breathing/Snoring  Patient snores:Yes, has a bed partner  Other people complain about his snoring: Yes  Patient has been told he stops breathing in his sleep: no  He has issues with the following: Morning mouth dryness;Stuffy nose when you wake up;Heartburn or reflux at night;Getting up to urinate more than once    Movement:  Patient gets pain, discomfort, with an urge to move:  No  It happens when he is resting:  No  It happens more at night:  No  Patient has been told he kicks his legs at night:  No     Behaviors in Sleep:  Geronimo Coats has experienced the following behaviors while sleeping: No  He has experienced sudden muscle weakness during the day: No  No dream enactment       CAFFEINE AND OTHER SUBSTANCES:    Patient consumes caffeinated  beverages per day:  0  Last caffeine use is usually: n/a  List of any prescribed or over the counter stimulants that patient takes: none  List of any prescribed or over the counter sleep medication patient takes: none  List of previous sleep medications that patient has tried: hx of using years ago cannot remember name  Patient drinks alcohol to help them sleep: No  Patient drinks alcohol near bedtime: No    Family History:  Patient has a family member been diagnosed with a sleep disorder: No            SCALES:    EPWORTH SLEEPINESS SCALE      Potosi Sleepiness Scale ( CLARK Powell  [id number]<br>ESS - [place]/English - Final version - 21 Nov 07 - [facility].) 12/21/2022   Sitting and reading Would never doze   Watching TV High chance of dozing   Sitting, inactive in a public place (e.g. a theatre or a meeting) Moderate chance of dozing   As a passenger in a car for an hour without a break Moderate chance of dozing   Lying down to rest in the afternoon when circumstances permit Moderate chance of dozing   Sitting and talking to someone Would never doze   Sitting quietly after a lunch without alcohol Moderate chance of dozing   In a car, while stopped for a few minutes in traffic Moderate chance of dozing   Potosi Score (MC) 13   Potosi Score (Sleep) 13         INSOMNIA SEVERITY INDEX (MARGARET)      Insomnia Severity Index (MARGARET) 12/21/2022   Difficulty falling asleep 3   Difficulty staying asleep 3   Problems waking up too early 2   How SATISFIED/DISSATISFIED are you with your CURRENT sleep pattern? 3   How NOTICEABLE to others do you think your sleep problem is in terms of impairing the quality of your life? 3   How WORRIED/DISTRESSED are you about your current sleep problem? 3   To what extent do you consider your sleep problem to INTERFERE with your daily functioning (e.g. daytime fatigue, mood, ability to function at work/daily chores, concentration, memory, mood, etc.) CURRENTLY? 2   MARGARET Total Score 19        Guidelines for Scoring/Interpretation:  Total score categories:  0-7 = No clinically significant insomnia   8-14 = Subthreshold insomnia   15-21 = Clinical insomnia (moderate severity)  22-28 = Clinical insomnia (severe)  Used via courtesy of www.myhealth.va.gov with permission from Ady Hairston PhD., [facility]        Allergies:    Allergies   Allergen Reactions     Chocolate      Nuts      Orange Fruit [Citrus] Cough       Medications:    Current Outpatient Medications   Medication Sig Dispense Refill     amLODIPine (NORVASC) 10 MG tablet Take 1 tablet (10 mg) by mouth daily 30 tablet 3     aspirin (ASA) 81 MG EC tablet Take 1 tablet (81 mg) by mouth daily 90 tablet 4     aspirin (ASA) 81 MG EC tablet Take 1 tablet (81 mg) by mouth daily 90 tablet 3     atorvastatin (LIPITOR) 20 MG tablet Take 1 tablet (20 mg) by mouth every evening 90 tablet 3     carvedilol (COREG) 12.5 MG tablet Take 1 tablet (12.5 mg) by mouth 2 times daily (with meals) 60 tablet 3     cloNIDine (CATAPRES) 0.1 MG tablet Take 1 tablet (0.1 mg) by mouth 2 times daily 60 tablet 3     clopidogrel (PLAVIX) 75 MG tablet 1 tablet (75 mg) by Oral or NG Tube route daily 16 tablet 0     losartan (COZAAR) 100 MG tablet Take 1 tablet (100 mg) by mouth daily 90 tablet 3     Multiple Vitamins-Minerals (ONE-A-DAY MENS 50+ PO) Take 1 tablet by mouth daily         Problem List:  Patient Active Problem List    Diagnosis Date Noted     History of CVA (cerebrovascular accident) 10/01/2022     Priority: High     Presented 10/1/22 with L hemibody numbness, multifocal bilateral micro-hemorrhages and punctate infarct of R posterior deneen on MR felt to be 2/2 microvascular angiopathy 2/2 high grade medication-refractory hypertension.        Hyperaldosteronism (H) 10/11/2022     Priority: Medium     Eliud : renin ratio 118 10/2022       GARTH (obstructive sleep apnea) 10/05/2022     Priority: Medium     Suspect GARTH given STOP-BANG 5, high grade refractory  HTN       Thrombocytopenia (H) 04/08/2022     Priority: Medium     Since at least 2019       Hepatitis B, chronic (H) 11/24/2020     Priority: Medium     June 2019 positive surface antigen and positive core antibody.   November 2020 surface antigen was negative, surface antibody was not protective at 0.79, core antibody was positive.    December 2020 additional labs ordered by GI but not completed...       Hypertension 04/18/2016     Priority: Medium     Gastroesophageal reflux disease 04/18/2016     Priority: Low        Past Medical/Surgical History:  Past Medical History:   Diagnosis Date     Right pontine stroke (H) 10/1/2022     Past Surgical History:   Procedure Laterality Date     HERNIA REPAIR       LAPAROSCOPIC CHOLECYSTECTOMY         Social History:  Social History     Socioeconomic History     Marital status:      Spouse name: Not on file     Number of children: Not on file     Years of education: Not on file     Highest education level: Not on file   Occupational History     Not on file   Tobacco Use     Smoking status: Never     Passive exposure: Never     Smokeless tobacco: Never   Vaping Use     Vaping Use: Never used   Substance and Sexual Activity     Alcohol use: Yes     Comment: rarely 2-3 times per year     Drug use: Never     Sexual activity: Not Currently     Partners: Female   Other Topics Concern     Not on file   Social History Narrative     Not on file     Social Determinants of Health     Financial Resource Strain: Medium Risk     Difficulty of Paying Living Expenses: Somewhat hard   Food Insecurity: No Food Insecurity     Worried About Running Out of Food in the Last Year: Never true     Ran Out of Food in the Last Year: Never true   Transportation Needs: No Transportation Needs     Lack of Transportation (Medical): No     Lack of Transportation (Non-Medical): No   Physical Activity: Inactive     Days of Exercise per Week: 0 days     Minutes of Exercise per Session: 0 min   Stress:  "No Stress Concern Present     Feeling of Stress : Only a little   Social Connections: Moderately Integrated     Frequency of Communication with Friends and Family: More than three times a week     Frequency of Social Gatherings with Friends and Family: Patient refused     Attends Quaker Services: 1 to 4 times per year     Active Member of Clubs or Organizations: No     Attends Club or Organization Meetings: Patient refused     Marital Status:    Intimate Partner Violence: Not At Risk     Fear of Current or Ex-Partner: No     Emotionally Abused: No     Physically Abused: No     Sexually Abused: No   Housing Stability: Unknown     Unable to Pay for Housing in the Last Year: No     Number of Places Lived in the Last Year: Not on file     Unstable Housing in the Last Year: No       Family History:  Family History   Problem Relation Age of Onset     Hypertension Sister        Review of Systems:  A complete review of systems reviewed by me is negative with the exeption of what has been mentioned in the history of present illness.  In the last TWO WEEKS have you experienced any of the following symptoms?  Fevers: No  Night Sweats: No  Weight Gain: Yes  Pain at Night: No  Double Vision: No  Changes in Vision: No  Difficulty Breathing through Nose: No  Sore Throat in Morning: No  Dry Mouth in the Morning: No  Shortness of Breath Lying Flat: No  Shortness of Breath With Activity: Yes  Awakening with Shortness of Breath: No  Increased Cough: Yes  Heart Racing at Night: Yes  Swelling in Feet or Legs: No  Diarrhea at Night: No  Heartburn at Night: Yes  Urinating More than Once at Night: Yes  Losing Control of Urine at Night: No  Joint Pains at Night: No  Headaches in Morning: No  Weakness in Arms or Legs: No  Depressed Mood: No  Anxiety: Yes     Physical Examination:  Vitals: /74   Pulse 71   Resp 16   Ht 1.676 m (5' 6\")   Wt 75.2 kg (165 lb 12.8 oz)   SpO2 99%   BMI 26.76 kg/m    BMI= Body mass index is " 26.76 kg/m .    Neck Cir (cm): 37 cm      GENERAL APPEARANCE: alert and no distress  EYES: Eyes grossly normal to inspection and conjunctivae and sclerae normal  HENT: nose and mouth without ulcers or lesions and oropharynx crowded  NECK: no adenopathy, no asymmetry, masses, or scars and thyroid normal to palpation  RESP: lungs clear to auscultation - no rales, rhonchi or wheezes  CV: regular rates and rhythm, normal S1 S2, no S3 or S4 and no murmur, click or rub  ABDOMEN: schaphold  MS: extremities normal- no gross deformities noted  SKIN: no suspicious lesions or rashes  NEURO: Normal strength and tone, mentation intact, speech normal and cranial nerves 2-12 intact  Mallampati Class: III.  Tonsillar Stage: 1  hidden by pillars.         Data: All pertinent previous laboratory data reviewed     Recent Labs   Lab Test 10/18/22  0954 10/05/22  0637    139   POTASSIUM 3.3* 3.4   CHLORIDE 111* 106   CO2 29 24   ANIONGAP 4 9   * 85   BUN 16 19.0   CR 1.21 1.24*   EUGENIO 9.3 9.0       Recent Labs   Lab Test 10/05/22  0637   WBC 3.9*   RBC 5.28   HGB 15.1   HCT 43.8   MCV 83   MCH 28.6   MCHC 34.5   RDW 13.3   *       Recent Labs   Lab Test 10/18/22  0954   PROTTOTAL 7.2   ALBUMIN 3.7   BILITOTAL 1.0   ALKPHOS 84   AST 31   ALT 46       Iron Saturation Index   Date/Time Value Ref Range Status   05/21/2019 10:39 AM 41 15 - 46 % Final         Benito Hernandez MD 12/21/2022

## 2022-12-21 NOTE — NURSING NOTE
Brittany Michael scheduled patient for PSG & follow up, and new consult with Dr. Cale Denson. Writer handed all information to patient and AVS.

## 2023-03-10 DIAGNOSIS — I15.2 HYPERTENSION DUE TO ENDOCRINE DISORDER: ICD-10-CM

## 2023-03-10 RX ORDER — AMLODIPINE BESYLATE 10 MG/1
10 TABLET ORAL DAILY
Qty: 30 TABLET | Refills: 2 | Status: SHIPPED | OUTPATIENT
Start: 2023-03-10 | End: 2023-09-27

## 2023-03-10 RX ORDER — CLONIDINE HYDROCHLORIDE 0.1 MG/1
0.1 TABLET ORAL 2 TIMES DAILY
Qty: 60 TABLET | Refills: 2 | Status: SHIPPED | OUTPATIENT
Start: 2023-03-10 | End: 2023-05-22

## 2023-03-10 RX ORDER — CARVEDILOL 12.5 MG/1
12.5 TABLET ORAL 2 TIMES DAILY WITH MEALS
Qty: 60 TABLET | Refills: 2 | Status: SHIPPED | OUTPATIENT
Start: 2023-03-10 | End: 2023-05-22

## 2023-04-19 ENCOUNTER — ALLIED HEALTH/NURSE VISIT (OUTPATIENT)
Dept: FAMILY MEDICINE | Facility: CLINIC | Age: 63
End: 2023-04-19

## 2023-04-19 VITALS — DIASTOLIC BLOOD PRESSURE: 92 MMHG | SYSTOLIC BLOOD PRESSURE: 142 MMHG

## 2023-04-19 DIAGNOSIS — Z01.30 BP CHECK: Primary | ICD-10-CM

## 2023-04-19 PROCEDURE — 99207 PR NO CHARGE NURSE ONLY: CPT | Performed by: INTERNAL MEDICINE

## 2023-04-19 NOTE — PROGRESS NOTES
Geronimo Coats was evaluated at [facility] on April 19, 2023 at which time his blood pressure was:    BP Readings from Last 1 Encounters:   04/19/23 (!) 142/92       Reviewed lifestyle modifications for blood pressure control and reduction: including making healthy food choices, managing weight, getting regular exercise, smoking cessation, reducing alcohol consumption, monitoring blood pressure regularly.     Symptoms: None    BP Goal:< 140/90 mmHg    BP Assessment:  BP too high    Potential Reasons for BP too high: Medication nonadherence    BP Follow-Up Plan: Referral to PCP    Recommendation to Provider: we refilled his blood pressure medications today. There looks to be a gap in therapy possibly due to insurance issues. Would recommend a follow up blood pressure within 4 weeks.     Note completed by: Arlene Hernandez, PharmD    [phone]

## 2023-05-22 ENCOUNTER — OFFICE VISIT (OUTPATIENT)
Dept: FAMILY MEDICINE | Facility: CLINIC | Age: 63
End: 2023-05-22
Payer: MEDICARE

## 2023-05-22 VITALS
RESPIRATION RATE: 18 BRPM | BODY MASS INDEX: 25.88 KG/M2 | TEMPERATURE: 98.8 F | WEIGHT: 161 LBS | DIASTOLIC BLOOD PRESSURE: 71 MMHG | HEIGHT: 66 IN | SYSTOLIC BLOOD PRESSURE: 119 MMHG | HEART RATE: 72 BPM | OXYGEN SATURATION: 95 %

## 2023-05-22 DIAGNOSIS — Z12.11 SCREEN FOR COLON CANCER: Primary | ICD-10-CM

## 2023-05-22 DIAGNOSIS — N52.2 DRUG-INDUCED ERECTILE DYSFUNCTION: ICD-10-CM

## 2023-05-22 DIAGNOSIS — I10 PRIMARY HYPERTENSION: Chronic | ICD-10-CM

## 2023-05-22 DIAGNOSIS — I15.2 HYPERTENSION DUE TO ENDOCRINE DISORDER: ICD-10-CM

## 2023-05-22 LAB
ANION GAP SERPL CALCULATED.3IONS-SCNC: 11 MMOL/L (ref 7–15)
BUN SERPL-MCNC: 18.4 MG/DL (ref 8–23)
CALCIUM SERPL-MCNC: 9.3 MG/DL (ref 8.8–10.2)
CHLORIDE SERPL-SCNC: 106 MMOL/L (ref 98–107)
CREAT SERPL-MCNC: 1.42 MG/DL (ref 0.67–1.17)
DEPRECATED HCO3 PLAS-SCNC: 25 MMOL/L (ref 22–29)
GFR SERPL CREATININE-BSD FRML MDRD: 55 ML/MIN/1.73M2
GLUCOSE SERPL-MCNC: 125 MG/DL (ref 70–99)
POTASSIUM SERPL-SCNC: 3.4 MMOL/L (ref 3.4–5.3)
SODIUM SERPL-SCNC: 142 MMOL/L (ref 136–145)

## 2023-05-22 PROCEDURE — 36415 COLL VENOUS BLD VENIPUNCTURE: CPT | Performed by: INTERNAL MEDICINE

## 2023-05-22 PROCEDURE — 99213 OFFICE O/P EST LOW 20 MIN: CPT | Performed by: INTERNAL MEDICINE

## 2023-05-22 PROCEDURE — 80048 BASIC METABOLIC PNL TOTAL CA: CPT | Performed by: INTERNAL MEDICINE

## 2023-05-22 RX ORDER — SILDENAFIL 50 MG/1
50 TABLET, FILM COATED ORAL DAILY PRN
Qty: 20 TABLET | Refills: 4 | Status: ON HOLD | OUTPATIENT
Start: 2023-05-22 | End: 2024-10-02

## 2023-05-22 RX ORDER — CLONIDINE HYDROCHLORIDE 0.1 MG/1
0.1 TABLET ORAL 2 TIMES DAILY
Qty: 60 TABLET | Refills: 2 | Status: SHIPPED | OUTPATIENT
Start: 2023-05-22 | End: 2023-09-27

## 2023-05-22 RX ORDER — CARVEDILOL 12.5 MG/1
12.5 TABLET ORAL 2 TIMES DAILY WITH MEALS
Qty: 60 TABLET | Refills: 2 | Status: SHIPPED | OUTPATIENT
Start: 2023-05-22 | End: 2023-09-27

## 2023-05-22 ASSESSMENT — ENCOUNTER SYMPTOMS: HYPERTENSION: 1

## 2023-05-22 NOTE — PROGRESS NOTES
"  Assessment & Plan   Problem List Items Addressed This Visit     Hypertension (Chronic)    Relevant Medications    cloNIDine (CATAPRES) 0.1 MG tablet    carvedilol (COREG) 12.5 MG tablet    Other Relevant Orders    Basic metabolic panel  (Ca, Cl, CO2, Creat, Gluc, K, Na, BUN) (Completed)   Other Visit Diagnoses     Screen for colon cancer    -  Primary    Relevant Orders    Colonoscopy Screening  Referral    Drug-induced erectile dysfunction        Relevant Medications    sildenafil (VIAGRA) 50 MG tablet                BMI:   Estimated body mass index is 25.99 kg/m  as calculated from the following:    Height as of this encounter: 1.676 m (5' 6\").    Weight as of this encounter: 73 kg (161 lb).   Weight management plan: Discussed healthy diet and exercise guidelines    Work on weight loss  Regular exercise    Michele Saldana MD  [facility] SHAREE Melton is a 62 year old, presenting for the following health issues:  Hypertension        5/22/2023    10:43 AM   Additional Questions   Roomed by Regi LENZ     Hypertension Follow-up      Do you check your blood pressure regularly outside of the clinic? Yes     Are you following a low salt diet? Yes    Are your blood pressures ever more than 140 on the top number (systolic) OR more   than 90 on the bottom number (diastolic), for example 140/90? No      How many servings of fruits and vegetables do you eat daily?  2-3    On average, how many sweetened beverages do you drink each day (Examples: soda, juice, sweet tea, etc.  Do NOT count diet or artificially sweetened beverages)?   0    How many days per week do you exercise enough to make your heart beat faster? 3 or less    How many minutes a day do you exercise enough to make your heart beat faster? 10 - 19    How many days per week do you miss taking your medication? 0          Review of Systems   Constitutional, HEENT, cardiovascular, pulmonary, gi and gu systems are negative, " "except as otherwise noted.      Objective    /71 (BP Location: Left arm, Patient Position: Chair, Cuff Size: Adult Regular)   Pulse 72   Temp 98.8  F (37.1  C)   Resp 18   Ht 1.676 m (5' 6\")   Wt 73 kg (161 lb)   SpO2 95%   BMI 25.99 kg/m    Body mass index is 25.99 kg/m .  Physical Exam   GENERAL: healthy, alert and no distress  EYES: Eyes grossly normal to inspection, PERRL and conjunctivae and sclerae normal  HENT: ear canals and TM's normal, nose and mouth without ulcers or lesions  NECK: no adenopathy, no asymmetry, masses, or scars and thyroid normal to palpation  RESP: lungs clear to auscultation - no rales, rhonchi or wheezes  CV: regular rate and rhythm, normal S1 S2, no S3 or S4, no murmur, click or rub, no peripheral edema and peripheral pulses strong  ABDOMEN: soft, nontender, no hepatosplenomegaly, no masses and bowel sounds normal  MS: no gross musculoskeletal defects noted, no edema  SKIN: no suspicious lesions or rashes  NEURO: Normal strength and tone, mentation intact and speech normal  BACK: no CVA tenderness, no paralumbar tenderness  PSYCH: mentation appears normal, affect normal/bright  LYMPH: no cervical, supraclavicular, axillary, or inguinal adenopathy    Results for orders placed or performed in visit on 05/22/23   Basic metabolic panel  (Ca, Cl, CO2, Creat, Gluc, K, Na, BUN)     Status: Abnormal   Result Value Ref Range    Sodium 142 136 - 145 mmol/L    Potassium 3.4 3.4 - 5.3 mmol/L    Chloride 106 98 - 107 mmol/L    Carbon Dioxide (CO2) 25 22 - 29 mmol/L    Anion Gap 11 7 - 15 mmol/L    Urea Nitrogen 18.4 8.0 - 23.0 mg/dL    Creatinine 1.42 (H) 0.67 - 1.17 mg/dL    Calcium 9.3 8.8 - 10.2 mg/dL    Glucose 125 (H) 70 - 99 mg/dL    GFR Estimate 55 (L) >60 mL/min/1.73m2                   "

## 2023-05-22 NOTE — LETTER
June 1, 2023    Geronimo Coats  [address]  [place] [address]          Dear ,    We are writing to inform you of your test results.  Labs show a mild change in the kidney function likely related to the medication effects.  However, in the long run these medications will preserve kidney function       Resulted Orders   Basic metabolic panel  (Ca, Cl, CO2, Creat, Gluc, K, Na, BUN)   Result Value Ref Range    Sodium 142 136 - 145 mmol/L    Potassium 3.4 3.4 - 5.3 mmol/L    Chloride 106 98 - 107 mmol/L    Carbon Dioxide (CO2) 25 22 - 29 mmol/L    Anion Gap 11 7 - 15 mmol/L    Urea Nitrogen 18.4 8.0 - 23.0 mg/dL    Creatinine 1.42 (H) 0.67 - 1.17 mg/dL    Calcium 9.3 8.8 - 10.2 mg/dL    Glucose 125 (H) 70 - 99 mg/dL    GFR Estimate 55 (L) >60 mL/min/1.73m2      Comment:      eGFR calculated using 2021 CKD-EPI equation.       If you have any questions or concerns, please call the clinic at the number listed above.       Sincerely,      Michele Saldana MD

## 2023-07-24 ENCOUNTER — OFFICE VISIT (OUTPATIENT)
Dept: FAMILY MEDICINE | Facility: CLINIC | Age: 63
End: 2023-07-24
Payer: MEDICARE

## 2023-07-24 VITALS
TEMPERATURE: 97.7 F | SYSTOLIC BLOOD PRESSURE: 128 MMHG | DIASTOLIC BLOOD PRESSURE: 82 MMHG | HEIGHT: 66 IN | WEIGHT: 159.13 LBS | HEART RATE: 72 BPM | RESPIRATION RATE: 16 BRPM | OXYGEN SATURATION: 100 % | BODY MASS INDEX: 25.57 KG/M2

## 2023-07-24 DIAGNOSIS — R10.9 LEFT LATERAL ABDOMINAL PAIN: ICD-10-CM

## 2023-07-24 DIAGNOSIS — K59.00 CONSTIPATION, UNSPECIFIED CONSTIPATION TYPE: ICD-10-CM

## 2023-07-24 DIAGNOSIS — Z12.5 SCREENING FOR PROSTATE CANCER: ICD-10-CM

## 2023-07-24 DIAGNOSIS — R73.01 IMPAIRED FASTING GLUCOSE: ICD-10-CM

## 2023-07-24 DIAGNOSIS — Z12.11 SCREEN FOR COLON CANCER: Primary | ICD-10-CM

## 2023-07-24 DIAGNOSIS — K21.9 GASTROESOPHAGEAL REFLUX DISEASE, UNSPECIFIED WHETHER ESOPHAGITIS PRESENT: ICD-10-CM

## 2023-07-24 DIAGNOSIS — R53.83 FATIGUE, UNSPECIFIED TYPE: ICD-10-CM

## 2023-07-24 LAB
ALBUMIN SERPL BCG-MCNC: 4 G/DL (ref 3.5–5.2)
ALP SERPL-CCNC: 87 U/L (ref 40–129)
ALT SERPL W P-5'-P-CCNC: 39 U/L (ref 0–70)
ANION GAP SERPL CALCULATED.3IONS-SCNC: 11 MMOL/L (ref 7–15)
AST SERPL W P-5'-P-CCNC: 45 U/L (ref 0–45)
BASOPHILS # BLD AUTO: 0 10E3/UL (ref 0–0.2)
BASOPHILS NFR BLD AUTO: 1 %
BILIRUB SERPL-MCNC: 0.7 MG/DL
BUN SERPL-MCNC: 18.4 MG/DL (ref 8–23)
CALCIUM SERPL-MCNC: 9 MG/DL (ref 8.8–10.2)
CHLORIDE SERPL-SCNC: 104 MMOL/L (ref 98–107)
CREAT SERPL-MCNC: 1.35 MG/DL (ref 0.67–1.17)
DEPRECATED HCO3 PLAS-SCNC: 26 MMOL/L (ref 22–29)
EOSINOPHIL # BLD AUTO: 0.1 10E3/UL (ref 0–0.7)
EOSINOPHIL NFR BLD AUTO: 3 %
ERYTHROCYTE [DISTWIDTH] IN BLOOD BY AUTOMATED COUNT: 13.1 % (ref 10–15)
GFR SERPL CREATININE-BSD FRML MDRD: 58 ML/MIN/1.73M2
GLUCOSE SERPL-MCNC: 105 MG/DL (ref 70–99)
HBA1C MFR BLD: 5.7 % (ref 0–5.6)
HCT VFR BLD AUTO: 42.1 % (ref 40–53)
HGB BLD-MCNC: 14.3 G/DL (ref 13.3–17.7)
IMM GRANULOCYTES # BLD: 0 10E3/UL
IMM GRANULOCYTES NFR BLD: 0 %
LYMPHOCYTES # BLD AUTO: 1.5 10E3/UL (ref 0.8–5.3)
LYMPHOCYTES NFR BLD AUTO: 42 %
MCH RBC QN AUTO: 28.7 PG (ref 26.5–33)
MCHC RBC AUTO-ENTMCNC: 34 G/DL (ref 31.5–36.5)
MCV RBC AUTO: 85 FL (ref 78–100)
MONOCYTES # BLD AUTO: 0.4 10E3/UL (ref 0–1.3)
MONOCYTES NFR BLD AUTO: 12 %
NEUTROPHILS # BLD AUTO: 1.5 10E3/UL (ref 1.6–8.3)
NEUTROPHILS NFR BLD AUTO: 42 %
PLATELET # BLD AUTO: 105 10E3/UL (ref 150–450)
POTASSIUM SERPL-SCNC: 3 MMOL/L (ref 3.4–5.3)
PROT SERPL-MCNC: 7 G/DL (ref 6.4–8.3)
PSA SERPL DL<=0.01 NG/ML-MCNC: 3.5 NG/ML (ref 0–4.5)
RBC # BLD AUTO: 4.98 10E6/UL (ref 4.4–5.9)
SODIUM SERPL-SCNC: 141 MMOL/L (ref 136–145)
TSH SERPL DL<=0.005 MIU/L-ACNC: 1.28 UIU/ML (ref 0.3–4.2)
WBC # BLD AUTO: 3.5 10E3/UL (ref 4–11)

## 2023-07-24 PROCEDURE — 36415 COLL VENOUS BLD VENIPUNCTURE: CPT | Performed by: FAMILY MEDICINE

## 2023-07-24 PROCEDURE — 83036 HEMOGLOBIN GLYCOSYLATED A1C: CPT | Performed by: FAMILY MEDICINE

## 2023-07-24 PROCEDURE — G0103 PSA SCREENING: HCPCS | Performed by: FAMILY MEDICINE

## 2023-07-24 PROCEDURE — 99214 OFFICE O/P EST MOD 30 MIN: CPT | Performed by: FAMILY MEDICINE

## 2023-07-24 PROCEDURE — 85025 COMPLETE CBC W/AUTO DIFF WBC: CPT | Performed by: FAMILY MEDICINE

## 2023-07-24 PROCEDURE — 84443 ASSAY THYROID STIM HORMONE: CPT | Performed by: FAMILY MEDICINE

## 2023-07-24 PROCEDURE — 80053 COMPREHEN METABOLIC PANEL: CPT | Performed by: FAMILY MEDICINE

## 2023-07-24 RX ORDER — DOCUSATE SODIUM 100 MG/1
100 CAPSULE, LIQUID FILLED ORAL 2 TIMES DAILY PRN
Qty: 60 CAPSULE | Refills: 3 | Status: ON HOLD | OUTPATIENT
Start: 2023-07-24 | End: 2024-10-02

## 2023-07-24 ASSESSMENT — PAIN SCALES - GENERAL: PAINLEVEL: NO PAIN (0)

## 2023-07-24 NOTE — PATIENT INSTRUCTIONS
Call and schedule ultrasound of abdoment    Call to schedule upper and lower scope exams ( EGD and colonoscopy )    Stay well hydrated    Try stool softener docusate 1-2 x daily to help bowels    Increase walking / activity     We will send you lab results

## 2023-07-24 NOTE — LETTER
"July 27, 2023    Geronimo Coats  [address]  [place] [address]          Dear ,    We are writing to inform you of your test results.  Potassium is low.  Increase intake of bananas and citrus fruits/ juices.     The hemoglobin a1c is barely into the \"prediabetes\" range.  No medication needed for this; just keep working on healthy diet/exercise as you are able.     Do the abdominal ultrasound as we discussed.       Resulted Orders   Comprehensive metabolic panel   Result Value Ref Range    Sodium 141 136 - 145 mmol/L    Potassium 3.0 (L) 3.4 - 5.3 mmol/L    Chloride 104 98 - 107 mmol/L    Carbon Dioxide (CO2) 26 22 - 29 mmol/L    Anion Gap 11 7 - 15 mmol/L    Urea Nitrogen 18.4 8.0 - 23.0 mg/dL    Creatinine 1.35 (H) 0.67 - 1.17 mg/dL    Calcium 9.0 8.8 - 10.2 mg/dL    Glucose 105 (H) 70 - 99 mg/dL    Alkaline Phosphatase 87 40 - 129 U/L    AST 45 0 - 45 U/L      Comment:      Reference intervals for this test were updated on 6/12/2023 to more accurately reflect our healthy population. There may be differences in the flagging of prior results with similar values performed with this method. Interpretation of those prior results can be made in the context of the updated reference intervals.    ALT 39 0 - 70 U/L      Comment:      Reference intervals for this test were updated on 6/12/2023 to more accurately reflect our healthy population. There may be differences in the flagging of prior results with similar values performed with this method. Interpretation of those prior results can be made in the context of the updated reference intervals.      Protein Total 7.0 6.4 - 8.3 g/dL    Albumin 4.0 3.5 - 5.2 g/dL    Bilirubin Total 0.7 <=1.2 mg/dL    GFR Estimate 58 (L) >60 mL/min/1.73m2   Prostate Specific Antigen Screen   Result Value Ref Range    Prostate Specific Antigen Screen 3.50 0.00 - 4.50 ng/mL    Narrative    This result is obtained using the Roche Elecsys total PSA method on the " carlos e801 immunoassay analyzer. Results obtained with different assay methods or kits cannot be used interchangeably.   TSH with free T4 reflex   Result Value Ref Range    TSH 1.28 0.30 - 4.20 uIU/mL   Hemoglobin A1c   Result Value Ref Range    Hemoglobin A1C 5.7 (H) 0.0 - 5.6 %      Comment:      Normal <5.7%   Prediabetes 5.7-6.4%    Diabetes 6.5% or higher     Note: Adopted from ADA consensus guidelines.   CBC with platelets and differential   Result Value Ref Range    WBC Count 3.5 (L) 4.0 - 11.0 10e3/uL    RBC Count 4.98 4.40 - 5.90 10e6/uL    Hemoglobin 14.3 13.3 - 17.7 g/dL    Hematocrit 42.1 40.0 - 53.0 %    MCV 85 78 - 100 fL    MCH 28.7 26.5 - 33.0 pg    MCHC 34.0 31.5 - 36.5 g/dL    RDW 13.1 10.0 - 15.0 %    Platelet Count 105 (L) 150 - 450 10e3/uL    % Neutrophils 42 %    % Lymphocytes 42 %    % Monocytes 12 %    % Eosinophils 3 %    % Basophils 1 %    % Immature Granulocytes 0 %    Absolute Neutrophils 1.5 (L) 1.6 - 8.3 10e3/uL    Absolute Lymphocytes 1.5 0.8 - 5.3 10e3/uL    Absolute Monocytes 0.4 0.0 - 1.3 10e3/uL    Absolute Eosinophils 0.1 0.0 - 0.7 10e3/uL    Absolute Basophils 0.0 0.0 - 0.2 10e3/uL    Absolute Immature Granulocytes 0.0 <=0.4 10e3/uL       If you have any questions or concerns, please call the clinic at the number listed above.       Sincerely,      Mike Gomez MD

## 2023-07-24 NOTE — PROGRESS NOTES
"       Kari Melton is a 63 year old, presenting for the following health issues:  Patient Request        7/24/2023     8:05 AM   Additional Questions   Roomed by Rochelle Flynn   Accompanied by Wife     HPI     Abdominal/left side pain off and on for the past 6 months  Hard bowel movements     No bloody or black stools    Eating and drinking okay          Review of Systems   See above        Objective    /82 (BP Location: Right arm, Patient Position: Chair, Cuff Size: Adult Regular)   Pulse 72   Temp 97.7  F (36.5  C) (Temporal)   Resp 16   Ht 1.676 m (5' 6\")   Wt 72.2 kg (159 lb 2 oz)   SpO2 100%   BMI 25.68 kg/m    Body mass index is 25.68 kg/m .  Physical Exam  Constitutional:       Appearance: He is well-developed.   HENT:      Head: Normocephalic and atraumatic.   Eyes:      Conjunctiva/sclera: Conjunctivae normal.   Neck:      Vascular: No carotid bruit.   Cardiovascular:      Rate and Rhythm: Normal rate and regular rhythm.      Heart sounds: Normal heart sounds.   Pulmonary:      Effort: Pulmonary effort is normal. No respiratory distress.      Breath sounds: Normal breath sounds.   Abdominal:      General: There is no distension.      Palpations: Abdomen is soft.      Tenderness: There is abdominal tenderness (mild subjective left sided discomfort). There is no right CVA tenderness, left CVA tenderness, guarding or rebound.   Neurological:      Mental Status: He is alert and oriented to person, place, and time.      Cranial Nerves: No cranial nerve deficit.   Psychiatric:         Speech: Speech normal.         Behavior: Behavior normal.      No edema    Radials symmetric       ASSESSMENT / PLAN:  (Z12.11) Screen for colon cancer  (primary encounter diagnosis)  Comment: patient has never had colonoscopy  Plan: Adult GI  Referral - Procedure Only             (R10.9) Left lateral abdominal pain  Comment: good to image abd  Plan: US Abdomen Complete        Patient to " schedule    (K21.9) Gastroesophageal reflux disease, unspecified whether esophagitis present  Comment: severe gerd , good to get egd and colonoscopy both, patient to schedule  Plan: Adult GI  Referral - Procedure Only             (R73.01) Impaired fasting glucose  Comment: check   Plan: Hemoglobin A1c             (R53.83) Fatigue, unspecified type  Comment: check   Plan: CBC with Platelets & Differential,         Comprehensive metabolic panel, TSH with free T4        reflex             (Z12.5) Screening for prostate cancer  Comment: psa   Plan: Prostate Specific Antigen Screen             (K59.00) Constipation, unspecified constipation type  Comment: trial of stool softener given hard stools   Plan: docusate sodium (COLACE) 100 MG capsule               I reviewed the patient's medications, allergies, medical history, family history, and social history.    Mike Gomez MD

## 2023-07-25 ENCOUNTER — HOSPITAL ENCOUNTER (OUTPATIENT)
Facility: AMBULATORY SURGERY CENTER | Age: 63
End: 2023-07-25
Attending: STUDENT IN AN ORGANIZED HEALTH CARE EDUCATION/TRAINING PROGRAM

## 2023-07-25 ENCOUNTER — TELEPHONE (OUTPATIENT)
Dept: GASTROENTEROLOGY | Facility: CLINIC | Age: 63
End: 2023-07-25
Payer: MEDICARE

## 2023-07-25 NOTE — TELEPHONE ENCOUNTER
"Endoscopy Scheduling Screen    Have you had a positive Covid test in the last 14 days?  No    Are you active on MyChart?   No    What insurance is in the chart?  Other:  pending mnsure per patient     Ordering/Referring Provider:     Mike Gomez      (If ordering provider performs procedure, schedule with ordering provider unless otherwise instructed. )    BMI: Estimated body mass index is 25.68 kg/m  as calculated from the following:    Height as of 7/24/23: 1.676 m (5' 6\").    Weight as of 7/24/23: 72.2 kg (159 lb 2 oz).     Sedation Ordered  moderate sedation.   If patient BMI > 50 do not schedule in ASC.    Are you taking any prescription medications for pain?   No    Are you taking methadone or Suboxone?  Yes   Patient must be scheduled with MAC sedation.    Please send In Basket alert to [facility] with MRN.    Do you have a history of malignant hyperthermia or adverse reaction to anesthesia?  No    (Females) Are you currently pregnant?   No     Have you been diagnosed or told you have pulmonary hypertension?   No    Do you have an LVAD?  No    Have you been told you have moderate to severe sleep apnea?  No    Have you been told you have COPD, asthma, or any other lung disease?  No    Do you have any heart conditions?  No     Have you ever had or are you awaiting a heart or lung transplant?   No    Have you had a stroke or transient ischemic attack (TIA aka \"mini stroke\" in the last 6 months?   No    Have you been diagnosed with or been told you have cirrhosis of the liver?   No    Are you currently on dialysis?   No    Do you need assistance transferring?   No    BMI: Estimated body mass index is 25.68 kg/m  as calculated from the following:    Height as of 7/24/23: 1.676 m (5' 6\").    Weight as of 7/24/23: 72.2 kg (159 lb 2 oz).     Is patients BMI > 40 and scheduling location [facility]?  No    Do you take the medication Phentermine, Ozempic or Wegovy?  No    Do you take the medication Naltrexone?  No    Do you take " blood thinners?  No      Prep   Are you currently on dialysis or do you have chronic kidney disease?  No    Do you have a diagnosis of diabetes?  No    Do you have a diagnosis of cystic fibrosis (CF)?  No    On a regular basis do you go 3 -5 days between bowel movements?  Yes (Extended Prep)    BMI > 40?  No    Preferred Pharmacy:    [facility] - [address] [[id number]]     Final Scheduling Details   Colonoscopy prep sent?  MiraLAX (No Mag Citrate)    Procedure scheduled  Colonoscopy / Upper endoscopy (EGD)    Surgeon:  Nichol     Date of procedure:  10/12/23     Schedule PAC:   No    Location  [facility] - ASC    Sedation   Moderate Sedation    Patient Reminders:   You will receive a call from a Nurse to review instructions and health history.  This assessment must be completed prior to your procedure.  Failure to complete the Nurse assessment may result in the procedure being cancelled.      On the day of your procedure, please designate an adult(s) who can drive you home stay with you for the next 24 hours. The medicines used in the exam will make you sleepy. You will not be able to drive.      You cannot take public transportation, ride share services, or non-medical taxi service without a responsible caregiver.  Medical transport services are allowed with the requirement that a responsible caregiver will receive you at your destination.  We require that drivers and caregivers are confirmed prior to your procedure.

## 2023-07-25 NOTE — RESULT ENCOUNTER NOTE
"Potassium is low.  Increase intake of bananas and citrus fruits/ juices.    The hemoglobin a1c is barely into the \"prediabetes\" range.  No medication needed for this; just keep working on healthy diet/exercise as you are able.     Do the abdominal ultrasound as we discussed.    Mike Gomez MD  "

## 2023-07-27 ENCOUNTER — TELEPHONE (OUTPATIENT)
Dept: FAMILY MEDICINE | Facility: CLINIC | Age: 63
End: 2023-07-27

## 2023-07-27 ENCOUNTER — ANCILLARY PROCEDURE (OUTPATIENT)
Dept: ULTRASOUND IMAGING | Facility: CLINIC | Age: 63
End: 2023-07-27
Attending: FAMILY MEDICINE
Payer: MEDICARE

## 2023-07-27 DIAGNOSIS — R10.9 LEFT LATERAL ABDOMINAL PAIN: ICD-10-CM

## 2023-07-27 LAB — RADIOLOGIST FLAGS: NORMAL

## 2023-07-27 PROCEDURE — 76700 US EXAM ABDOM COMPLETE: CPT | Mod: TC | Performed by: INTERNAL MEDICINE

## 2023-07-27 NOTE — LETTER
July 28, 2023    Geronimo Coats  [address]  [place] [address]          Dear ,    We are writing to inform you of your test results.  I just tried to call you regarding this ultrasound result     It suggests that you may have cirrhosis of the liver.  It is unclear if this would be causing any symptoms.      Go ahead and schedule the upper and lower scope exams ( EGD and colonoscopy ) and then follow up in clinic after that with your primary doctor.   Resulted Orders   US Abdomen Complete   Result Value Ref Range    Radiologist flags Liver lesion. Borderline aorta dilatation.     Narrative    US ABDOMEN COMPLETE 7/27/2023 10:36 AM    CLINICAL HISTORY: Left lateral abdominal pain    TECHNIQUE: Complete abdominal ultrasound.    COMPARISON: Ultrasound 10/4/2022 and 6/25/2019.    FINDINGS:    GALLBLADDER: Surgically absent.    BILE Ducts: the common duct measures 8 mm.    LIVER: Coarsened heterogeneous echotexture with nodular contour  suggestive of cirrhosis. There is a new focal echogenic lesion within  the left hepatic lobe that measures up to 9 mm. Additional smaller  echogenic foci are noted within the right hepatic lobe and were not  previously seen.    RIGHT KIDNEY: Normal size. Normal echogenicity with no hydronephrosis  or mass. Benign simple cyst, not requiring additional follow-up.    LEFT KIDNEY: Normal size. Normal echogenicity with no hydronephrosis  or mass. Benign simple cysts measuring up to 4.6 cm, not requiring  additional follow-up.    SPLEEN: Normal.    PANCREAS: The visualized portions are normal.    AORTA: Maximum diameter of the aorta of approximately 3.0 cm.     IVC: Normal where visualized.    No ascites.      Impression    IMPRESSION:  1.  Cirrhotic morphology of the liver. There is a 9 mm echogenic  lesion within the left hepatic lobe which was not previously seen.  Additional echogenic foci within the right hepatic lobe. Recommend  liver MRI with and without  contrast for further evaluation.  2.  Mildly dilated common bile duct, nonspecific in the  postcholecystectomy state. Correlate with clinical and laboratory  values.  3.  Borderline aneurysmal dilatation of the proximal abdominal aorta.  See follow-up recommendations below.  4.  Bilateral benign renal simple cysts which do not require dedicated  follow-up.    AAA Size: 3.0 cm to 3.4 cm, Recommended Follow Up Ultrasound Aorta:  Every 3 years    REFERENCE:  SVS practice guidelines for the care of patients with an abdominal  aortic aneurysm: Executive summary. Brent Espinoza MD, PhD et al.  Journal of Vascular Surgery Oct 2009.    [Recommend Follow Up: Liver lesion. Borderline aorta dilatation.]    This report will be copied to the [facility] to ensure a  provider acknowledges the finding. [facility] is available Monday  through Friday 8am-3:30 pm.    GERTRUDIS MCGEE MD         SYSTEM ID:  [id number]       If you have any questions or concerns, please call the clinic at the number listed above.       Sincerely,      Mike Gomez MD

## 2023-07-27 NOTE — TELEPHONE ENCOUNTER
"Lorene calling to report an incidental finding on patient's US from today     \"IMPRESSION:  1.  Cirrhotic morphology of the liver. There is a 9 mm echogenic  lesion within the left hepatic lobe which was not previously seen.  Additional echogenic foci within the right hepatic lobe. Recommend  liver MRI with and without contrast for further evaluation.  2.  Mildly dilated common bile duct, nonspecific in the  postcholecystectomy state. Correlate with clinical and laboratory  values.  3.  Borderline aneurysmal dilatation of the proximal abdominal aorta.  See follow-up recommendations below.  4.  Bilateral benign renal simple cysts which do not require dedicated  follow-up.     AAA Size: 3.0 cm to 3.4 cm, Recommended Follow Up Ultrasound Aorta:  Every 3 years     REFERENCE:  SVS practice guidelines for the care of patients with an abdominal  aortic aneurysm: Executive summary. Brent Espinoza MD, PhD et al.  Journal of Vascular Surgery Oct 2009.     [Recommend Follow Up: Liver lesion. Borderline aorta dilatation.]     This report will be copied to the [facility] to ensure a  provider acknowledges the finding. [facility] Center is available Monday  through Friday 8am-3:30 pm.\"    Kaylin Crews RN  [facility]    "

## 2023-07-28 NOTE — RESULT ENCOUNTER NOTE
I just tried to call you regarding this ultrasound result    It suggests that you may have cirrhosis of the liver.  It is unclear if this would be causing any symptoms.      Go ahead and schedule the upper and lower scope exams ( EGD and colonoscopy ) and then follow up in clinic after that with your primary doctor.     Mike Gomez MD

## 2023-07-28 NOTE — TELEPHONE ENCOUNTER
I tried to call patient with these results.    Patient not available.    Did result note, advised he get the upper and lower scopes done as we had advised in clinic.     Then follow up in clinic with primary provider.     Mike Gomez MD

## 2023-09-27 DIAGNOSIS — I15.2 HYPERTENSION DUE TO ENDOCRINE DISORDER: ICD-10-CM

## 2023-09-27 RX ORDER — AMLODIPINE BESYLATE 10 MG/1
10 TABLET ORAL DAILY
Qty: 90 TABLET | Refills: 3 | Status: SHIPPED | OUTPATIENT
Start: 2023-09-27 | End: 2024-07-25

## 2023-09-27 RX ORDER — CLONIDINE HYDROCHLORIDE 0.1 MG/1
0.1 TABLET ORAL 2 TIMES DAILY
Qty: 180 TABLET | Refills: 3 | Status: SHIPPED | OUTPATIENT
Start: 2023-09-27 | End: 2023-11-16

## 2023-09-27 RX ORDER — CARVEDILOL 12.5 MG/1
12.5 TABLET ORAL 2 TIMES DAILY WITH MEALS
Qty: 60 TABLET | Refills: 2 | Status: SHIPPED | OUTPATIENT
Start: 2023-09-27 | End: 2023-11-16

## 2023-10-06 ENCOUNTER — TELEPHONE (OUTPATIENT)
Dept: GASTROENTEROLOGY | Facility: CLINIC | Age: 63
End: 2023-10-06
Payer: MEDICARE

## 2023-10-06 NOTE — TELEPHONE ENCOUNTER
Attempted to reach patient 4x since 10/4 to confirm whether or not he was taking methadone/suboxone. It says yes on his screening questions, but he was scheduled with moderate sedation.     Per RN Talia GIL, ok to cancel patient because if it was PA call, he also was still not answering to complete pre-assessment and would have been canceled anyway.       Caller: Writer to patient  Reason for Reschedule/Cancellation (please be detailed, any staff messages or encounters to note?): Needs MAC if taking suboxone.       Prior to reschedule please review:  Ordering Provider: Mike Gomez MD  Sedation per order: Moderate, but needs MAC if using suboxone  Does patient have any ASC Exclusions, please identify?: No      Notes on Cancelled Procedure:  Procedure: Upper and Lower Endoscopy [EGD and Colonoscopy]   Date: 10/12/2023  [facility] [address] [place] [address], 5th Floor, [place] [address]  Surgeon: Lorraine      Rescheduled: No, if patient calls back , please confirm whether or not he uses methadone/suboxone. If so, will need MAC.

## 2023-10-06 NOTE — TELEPHONE ENCOUNTER
From: Mary Elizondo RN   Sent: 10/4/2023   8:34 AM CDT   To: Endoscopy Scheduling Pool     This patients TE with scheduling shows that he is taking suboxone and should have been scheduled with MAC. I thought I sent a message, not sure if it was a staff message or to the email. But he does need to be rescheduled if he is truly taking suboxone.     Mary

## 2023-11-16 ENCOUNTER — OFFICE VISIT (OUTPATIENT)
Dept: FAMILY MEDICINE | Facility: CLINIC | Age: 63
End: 2023-11-16
Payer: MEDICARE

## 2023-11-16 VITALS
OXYGEN SATURATION: 98 % | HEIGHT: 66 IN | BODY MASS INDEX: 25.52 KG/M2 | TEMPERATURE: 97.7 F | HEART RATE: 72 BPM | DIASTOLIC BLOOD PRESSURE: 85 MMHG | SYSTOLIC BLOOD PRESSURE: 135 MMHG | RESPIRATION RATE: 18 BRPM | WEIGHT: 158.8 LBS

## 2023-11-16 DIAGNOSIS — E78.5 HYPERLIPIDEMIA LDL GOAL <100: ICD-10-CM

## 2023-11-16 DIAGNOSIS — Z23 NEEDS FLU SHOT: ICD-10-CM

## 2023-11-16 DIAGNOSIS — Z12.11 SCREEN FOR COLON CANCER: Primary | ICD-10-CM

## 2023-11-16 DIAGNOSIS — I15.2 HYPERTENSION DUE TO ENDOCRINE DISORDER: ICD-10-CM

## 2023-11-16 DIAGNOSIS — N18.2 CHRONIC KIDNEY DISEASE, STAGE 2 (MILD): ICD-10-CM

## 2023-11-16 DIAGNOSIS — I63.9 CEREBROVASCULAR ACCIDENT (CVA), UNSPECIFIED MECHANISM (H): ICD-10-CM

## 2023-11-16 LAB
CHOLEST SERPL-MCNC: 89 MG/DL
HDLC SERPL-MCNC: 43 MG/DL
LDLC SERPL CALC-MCNC: 34 MG/DL
NONHDLC SERPL-MCNC: 46 MG/DL
TRIGL SERPL-MCNC: 62 MG/DL

## 2023-11-16 PROCEDURE — 36415 COLL VENOUS BLD VENIPUNCTURE: CPT | Performed by: INTERNAL MEDICINE

## 2023-11-16 PROCEDURE — 99214 OFFICE O/P EST MOD 30 MIN: CPT | Mod: 25 | Performed by: INTERNAL MEDICINE

## 2023-11-16 PROCEDURE — 82274 ASSAY TEST FOR BLOOD FECAL: CPT | Performed by: INTERNAL MEDICINE

## 2023-11-16 PROCEDURE — 80061 LIPID PANEL: CPT | Performed by: INTERNAL MEDICINE

## 2023-11-16 PROCEDURE — G0008 ADMIN INFLUENZA VIRUS VAC: HCPCS | Performed by: INTERNAL MEDICINE

## 2023-11-16 PROCEDURE — 90662 IIV NO PRSV INCREASED AG IM: CPT | Performed by: INTERNAL MEDICINE

## 2023-11-16 RX ORDER — LOSARTAN POTASSIUM 100 MG/1
100 TABLET ORAL DAILY
Qty: 90 TABLET | Refills: 3 | Status: SHIPPED | OUTPATIENT
Start: 2023-11-16 | End: 2024-07-25

## 2023-11-16 RX ORDER — ATORVASTATIN CALCIUM 20 MG/1
20 TABLET, FILM COATED ORAL EVERY EVENING
Qty: 90 TABLET | Refills: 3 | Status: SHIPPED | OUTPATIENT
Start: 2023-11-16 | End: 2024-07-25

## 2023-11-16 RX ORDER — CARVEDILOL 12.5 MG/1
12.5 TABLET ORAL 2 TIMES DAILY WITH MEALS
Qty: 180 TABLET | Refills: 4 | Status: SHIPPED | OUTPATIENT
Start: 2023-11-16 | End: 2024-07-25

## 2023-11-16 RX ORDER — CLONIDINE HYDROCHLORIDE 0.1 MG/1
0.1 TABLET ORAL 2 TIMES DAILY
Qty: 180 TABLET | Refills: 3 | Status: SHIPPED | OUTPATIENT
Start: 2023-11-16 | End: 2024-07-25

## 2023-11-16 NOTE — LETTER
November 29, 2023    Geronimo Coats  [address]  [place] [address]          Dear ,    We are writing to inform you of your test results.  This test is normal   No increased risk of colon cancer based on this test       Resulted Orders   Fecal colorectal cancer screen FIT - Future (S+30)   Result Value Ref Range    Occult Blood Screen FIT Negative Negative   Lipid panel reflex to direct LDL Non-fasting   Result Value Ref Range    Cholesterol 89 <200 mg/dL    Triglycerides 62 <150 mg/dL    Direct Measure HDL 43 >=40 mg/dL    LDL Cholesterol Calculated 34 <=100 mg/dL    Non HDL Cholesterol 46 <130 mg/dL    Narrative    Cholesterol  Desirable:  <200 mg/dL    Triglycerides  Normal:  Less than 150 mg/dL  Borderline High:  150-199 mg/dL  High:  200-499 mg/dL  Very High:  Greater than or equal to 500 mg/dL    Direct Measure HDL  Female:  Greater than or equal to 50 mg/dL   Male:  Greater than or equal to 40 mg/dL    LDL Cholesterol  Desirable:  <100mg/dL  Above Desirable:  100-129 mg/dL   Borderline High:  130-159 mg/dL   High:  160-189 mg/dL   Very High:  >= 190 mg/dL    Non HDL Cholesterol  Desirable:  130 mg/dL  Above Desirable:  130-159 mg/dL  Borderline High:  160-189 mg/dL  High:  190-219 mg/dL  Very High:  Greater than or equal to 220 mg/dL       If you have any questions or concerns, please call the clinic at the number listed above.       Sincerely,      Michlee Saldana MD

## 2023-11-16 NOTE — LETTER
November 16, 2023      Geronimo Coats  [address]  [place] [address]        To Whom It May Concern:    Geronimo Coats was seen in our clinic. He may return to work without restrictions. He is able to work an 8 hour shift without restrictions at this time .        Sincerely,        Michele Saldana MD

## 2023-11-16 NOTE — COMMUNITY RESOURCES LIST (ENGLISH)
11/16/2023   [facility]  N/A  For questions about this resource list or additional care needs, please contact your primary care clinic or care manager.  Phone: [phone]   Email: N/A   Address: [address] [place] [address]   Hours: N/A        Food and Nutrition       Food pantry  1  [facility] Distance: 0.88 miles      Pickup   [address] [place] [address]  Language: English  Hours: Tue 4:00 PM - 6:00 PM  Fees: Free   Phone: (209) 922-4231 Email: office@[facility] Website: http://Oatmeal     2  [facility] Distance: 1.63 miles      In-Person   [address] [address]  Language: English  Hours: Tue 10:00 AM - 11:30 AM , Wed 5:00 PM - 6:30 PM , Fri 10:00 AM - 11:30 AM  Fees: Free   Phone: (661) 104-2762 Email: info@[employer].org Website: http://www.[facility].org/     SNAP application assistance  3  [facility] [place]  [employer] [facility] ([employer] Distance: 1.39 miles      In-Person, Phone/Virtual   [address] [place] [address]  Language: Hebrew, Turkmen, English, Gujarati, Jaspal, Italian, French, Romanian, Tamazight, Italian  Hours: Mon - Wed 9:00 AM - 5:00 PM , Thu 12:00 PM - 6:00 PM , Fri 9:00 AM - 5:00 PM , Sun 10:30 AM - 2:00 PM Appt. Only  Fees: Free   Phone: (928) 166-8668 Email: info@[facility].org Website: https://www.sewa-aifw.org/     4  [employer]  [facility] Distance: 2.52 miles      In-Person, Phone/Virtual   [address] [place] [address]  Language: English, Hmong  Hours: Mon - Fri 8:30 AM - 4:30 PM  Fees: Free   Phone: (524) 216-9017 Ext.1 Email: info@[employer].org Website: https://www.[employer].org/     Soup kitchen or free meals  5  [facility] [employer] Meal Distance: 1.34 miles      [address] [address]  Language: English  Hours: Sat 11:30 AM - 12:30 PM  Fees: Free   Phone:  (185) 909-7450 Email: ramsey@[facility] Website: http://www.[facility].[employer]/     6  [facility] Distance: 1.55 miles      Pickup   [address] [place] [address]  Language: English  Hours: Tue 5:00 PM - 6:30 PM  Fees: Free   Phone: (401) 673-2005 Email: admin@[employer] Website: http://www.[employer]/          Important Numbers & Websites       Emergency Services   911  [facility]   311  Poison Control   (591) 441-2033  Suicide Prevention Lifeline   (836) 820-9485 (TALK)  Child Abuse Hotline   (232) 278-6333 (4-A-Child)  Sexual Assault Hotline   (171) 938-9513 (HOPE)  National Runaway Safeline   (207) 873-4547 (RUNAWAY)  All-Options Talkline   (873) 299-9335  Substance Abuse Referral   (680) 529-4639 (HELP)

## 2023-11-16 NOTE — LETTER
November 21, 2023    Geronimo Coats  [address]  [place] [address]          Dear ,    We are writing to inform you of your test results.  Labs look good Geronimo       Resulted Orders   Lipid panel reflex to direct LDL Non-fasting   Result Value Ref Range    Cholesterol 89 <200 mg/dL    Triglycerides 62 <150 mg/dL    Direct Measure HDL 43 >=40 mg/dL    LDL Cholesterol Calculated 34 <=100 mg/dL    Non HDL Cholesterol 46 <130 mg/dL    Narrative    Cholesterol  Desirable:  <200 mg/dL    Triglycerides  Normal:  Less than 150 mg/dL  Borderline High:  150-199 mg/dL  High:  200-499 mg/dL  Very High:  Greater than or equal to 500 mg/dL    Direct Measure HDL  Female:  Greater than or equal to 50 mg/dL   Male:  Greater than or equal to 40 mg/dL    LDL Cholesterol  Desirable:  <100mg/dL  Above Desirable:  100-129 mg/dL   Borderline High:  130-159 mg/dL   High:  160-189 mg/dL   Very High:  >= 190 mg/dL    Non HDL Cholesterol  Desirable:  130 mg/dL  Above Desirable:  130-159 mg/dL  Borderline High:  160-189 mg/dL  High:  190-219 mg/dL  Very High:  Greater than or equal to 220 mg/dL       If you have any questions or concerns, please call the clinic at the number listed above.       Sincerely,      Michele Saldana MD

## 2023-11-16 NOTE — PROGRESS NOTES
"  Assessment & Plan   Problem List Items Addressed This Visit       Hypertension (Chronic)    Relevant Medications    carvedilol (COREG) 12.5 MG tablet    cloNIDine (CATAPRES) 0.1 MG tablet    losartan (COZAAR) 100 MG tablet    Chronic kidney disease, stage 2 (mild)     Other Visit Diagnoses       Screen for colon cancer    -  Primary    Relevant Orders    Fecal colorectal cancer screen FIT - Future (S+30)    Needs flu shot        Relevant Orders    INFLUENZA VACCINE 65+ (FLUZONE HD) (Completed)    Hyperlipidemia LDL goal <100        Relevant Medications    atorvastatin (LIPITOR) 20 MG tablet    Other Relevant Orders    Lipid panel reflex to direct LDL Non-fasting    Cerebrovascular accident (CVA), unspecified mechanism (H)        Relevant Medications    atorvastatin (LIPITOR) 20 MG tablet                      BMI:   Estimated body mass index is 25.98 kg/m  as calculated from the following:    Height as of this encounter: 1.665 m (5' 5.55\").    Weight as of this encounter: 72 kg (158 lb 12.8 oz).   Weight management plan: Discussed healthy diet and exercise guidelines    Work on weight loss  Regular exercise    Michele Saldana MD  [facility] SHAREE Melton is a 63 year old, presenting for the following health issues:  work note      11/16/2023    10:43 AM   Additional Questions   Roomed by Xenia   Accompanied by Self       History of Present Illness       Reason for visit:  Needs to go back to work    He eats 2-3 servings of fruits and vegetables daily.He consumes 0 sweetened beverage(s) daily.He exercises with enough effort to increase his heart rate 20 to 29 minutes per day.  He exercises with enough effort to increase his heart rate 3 or less days per week.   He is taking medications regularly.     Left symptoms are residual   Home care giving   Company   Asigned up to feeling   Go to work unrestrictted   6 hours - 8 hours a day no restrictions                 Patient is here to " "discuss going back to work with more hours. He states he is feeling better.           Review of Systems   Constitutional, HEENT, cardiovascular, pulmonary, gi and gu systems are negative, except as otherwise noted.      Objective    /85   Pulse 72   Temp 97.7  F (36.5  C) (Temporal)   Resp 18   Ht 1.665 m (5' 5.55\")   Wt 72 kg (158 lb 12.8 oz)   SpO2 98%   BMI 25.98 kg/m    Body mass index is 25.98 kg/m .  Physical Exam   GENERAL: healthy, alert and no distress  EYES: Eyes grossly normal to inspection, PERRL and conjunctivae and sclerae normal  HENT: ear canals and TM's normal, nose and mouth without ulcers or lesions  NECK: no adenopathy, no asymmetry, masses, or scars and thyroid normal to palpation  RESP: lungs clear to auscultation - no rales, rhonchi or wheezes  CV: regular rate and rhythm, normal S1 S2, no S3 or S4, no murmur, click or rub, no peripheral edema and peripheral pulses strong  ABDOMEN: soft, nontender, no hepatosplenomegaly, no masses and bowel sounds normal  MS: no gross musculoskeletal defects noted, no edema  SKIN: no suspicious lesions or rashes  NEURO: Normal strength and tone, mentation intact and speech normal  PSYCH: mentation appears normal, affect normal/bright  LYMPH: no cervical, supraclavicular, axillary, or inguinal adenopathy  CRANIAL NERVES: Discs flat. Pupils equal, round and reactive to light. Extraocular movements full. Visual fields full. Face moves symmetrically. Tongue midline. Hearing intact to finger rubbing. CARDIOVASCULAR: S1, S2.   NEUROLOGIC: Motor strength 5/5, reflexes 2/4. Toe signs are down-going. Good finger-nose-finger, fine finger movement, and heel-shin maneuvers. Gait normal-based.    No results found for any visits on 11/16/23.                  "

## 2023-11-16 NOTE — COMMUNITY RESOURCES LIST (ENGLISH)
11/16/2023   [facility] [place]  N/A  For questions about this resource list or additional care needs, please contact your primary care clinic or care manager.  Phone: [phone]   Email: N/A   Address: [address] [place] [id number]   Hours: N/A        Food and Nutrition       Food pantry  1  [facility] Distance: 0.88 miles      Pickup   [address] [place] [address]  Language: English  Hours: Tue 4:00 PM - 6:00 PM  Fees: Free   Phone: (734) 648-9672 Email: office@[facility].OneTwoSee Website: http://[employer]     2  [facility] Distance: 1.63 miles      In-Person   [address] [address]  Language: English  Hours: Tue 10:00 AM - 11:30 AM , Wed 5:00 PM - 6:30 PM , Fri 10:00 AM - 11:30 AM  Fees: Free   Phone: (218) 338-3445 Email: info@[employer].org Website: http://www.\[facility]\"".org/     SNAP application assistance  3  [facility] ([facility]) Distance: 1.39 miles      In-Person, Phone/Virtual   [address] [place] [address]  Language: Albanian, Kyrgyz, English, Gujarati, Jaspal, Thai, Maori, Korean, Wolof, Ukrainian  Hours: Mon - Wed 9:00 AM - 5:00 PM , Thu 12:00 PM - 6:00 PM , Fri 9:00 AM - 5:00 PM , Sun 10:30 AM - 2:00 PM Appt. Only  Fees: Free   Phone: (726) 825-7885 Email: info@sewa-aifw.org Website: https://www.sewa-aifw.org/     4  [employer]  [facility] Distance: 2.52 miles      In-Person, Phone/Virtual   [address] [place] [address]  Language: English, Hmong  Hours: Mon - Fri 8:30 AM - 4:30 PM  Fees: Free   Phone: (698) 211-8971 Ext.1 Email: info@Chango.org Website: https://www.Chango.org/     Soup kitchen or free meals  5  [facility] - Family Table Meal Distance: 1.34 miles      [address] [address]  Language: English  Hours: Sat 11:30 AM - 12:30 PM  Fees: Free   Phone:  (217) 838-5232 Email: ramsey@[facility] Website: http://www.[facility].[employer]/     6  [facility] Dinner Distance: 1.55 miles      Pickup   [address] [place] [address]  Language: English  Hours: Tue 5:00 PM - 6:30 PM  Fees: Free   Phone: (601) 158-6856 Email: admin@[employer] Website: http://www.[employer]/          Important Numbers & Websites       Emergency Services   911  [facility]   311  Poison Control   (763) 377-5844  Suicide Prevention Lifeline   (980) 228-1237 (TALK)  Child Abuse Hotline   (782) 515-5398 (4-A-Child)  Sexual Assault Hotline   (416) 409-4477 (HOPE)  National Runaway Safeline   (687) 601-6444 (RUNAWAY)  All-Options Talkline   (464) 454-1884  Substance Abuse Referral   (499) 546-1995 (HELP)

## 2023-11-28 LAB — HEMOCCULT STL QL IA: NEGATIVE

## 2024-01-31 ENCOUNTER — OFFICE VISIT (OUTPATIENT)
Dept: FAMILY MEDICINE | Facility: CLINIC | Age: 64
End: 2024-01-31
Payer: MEDICARE

## 2024-01-31 VITALS
HEIGHT: 66 IN | HEART RATE: 94 BPM | RESPIRATION RATE: 16 BRPM | TEMPERATURE: 97.5 F | WEIGHT: 158 LBS | SYSTOLIC BLOOD PRESSURE: 132 MMHG | DIASTOLIC BLOOD PRESSURE: 79 MMHG | BODY MASS INDEX: 25.39 KG/M2 | OXYGEN SATURATION: 96 %

## 2024-01-31 DIAGNOSIS — K40.90 LEFT INGUINAL HERNIA: Primary | ICD-10-CM

## 2024-01-31 PROCEDURE — 99213 OFFICE O/P EST LOW 20 MIN: CPT | Performed by: FAMILY MEDICINE

## 2024-01-31 RX ORDER — RESPIRATORY SYNCYTIAL VIRUS VACCINE 120MCG/0.5
0.5 KIT INTRAMUSCULAR ONCE
Qty: 1 EACH | Refills: 0 | Status: CANCELLED | OUTPATIENT
Start: 2024-01-31 | End: 2024-01-31

## 2024-01-31 NOTE — PROGRESS NOTES
"  Assessment & Plan   Problem List Items Addressed This Visit    None  Visit Diagnoses       Left inguinal hernia    -  Primary    Relevant Orders    Adult General Surg Referral           Tylenol/Ibuprofen for pain, not strangulated, reducible today        Kari Melton is a 63 year old, presenting for the following health issues:  left side pain  (In groin area)        1/31/2024     1:51 PM   Additional Questions   Roomed by Nina ADAMS CMA     History of Present Illness       Reason for visit:  Left side pain in groin area  Symptom onset:  More than a month  Symptoms include:  Pain in groin area on left side  Symptom intensity:  Severe  Symptom progression:  Worsening  Had these symptoms before:  No  What makes it worse:  Walking  What makes it better:  Nothing    He eats 2-3 servings of fruits and vegetables daily.He consumes 0 sweetened beverage(s) daily.He exercises with enough effort to increase his heart rate 9 or less minutes per day.  He exercises with enough effort to increase his heart rate 3 or less days per week.   He is taking medications regularly.         Objective    /79 (BP Location: Right arm, Patient Position: Chair, Cuff Size: Adult Regular)   Pulse 94   Temp 97.5  F (36.4  C) (Temporal)   Resp 16   Ht 1.664 m (5' 5.5\")   Wt 71.7 kg (158 lb)   SpO2 96%   BMI 25.89 kg/m    Body mass index is 25.89 kg/m .  Physical Exam  Constitutional:       Appearance: Normal appearance. He is not ill-appearing.   Genitourinary:      Neurological:      Mental Status: He is alert.                    Signed Electronically by: YASHIRA SUAREZ DO    "

## 2024-02-05 NOTE — TELEPHONE ENCOUNTER
REFERRAL INFORMATION:  Referring Provider: Dr. Johnny Cowan  Referring Clinic: [facility] - Primary Care  Reason for Visit/Diagnosis: Left inguinal Hernia       FUTURE VISIT INFORMATION:  Appointment Date: 2/8/20024  Appointment Time: 12:30 PM     NOTES RECORD STATUS  DETAILS   OFFICE NOTE from Referring Provider Internal [facility]:  1/31/24 - [facility] OV with Dr. Cowan   OFFICE NOTE from Other Specialists Internal [facility]:  11/16/23 - [facility] OV with Dr. Saldana  7/24/23 - [facility] OV with Dr. Gomez   HOSPITAL DISCHARGE SUMMARY/ ED VISITS  N/A    OPERATIVE REPORT N/A    PERTINENT LABS Internal    IMAGING (CT, MRI, US, XR)  Internal [facility]:  7/27/23 - US Abdomen  10/4/22 - US Renal

## 2024-02-08 ENCOUNTER — OFFICE VISIT (OUTPATIENT)
Dept: SURGERY | Facility: CLINIC | Age: 64
End: 2024-02-08
Attending: FAMILY MEDICINE
Payer: MEDICARE

## 2024-02-08 ENCOUNTER — PRE VISIT (OUTPATIENT)
Dept: SURGERY | Facility: CLINIC | Age: 64
End: 2024-02-08

## 2024-02-08 VITALS
WEIGHT: 159.3 LBS | BODY MASS INDEX: 25.6 KG/M2 | SYSTOLIC BLOOD PRESSURE: 171 MMHG | OXYGEN SATURATION: 98 % | HEART RATE: 63 BPM | HEIGHT: 66 IN | DIASTOLIC BLOOD PRESSURE: 96 MMHG

## 2024-02-08 DIAGNOSIS — K40.90 LEFT INGUINAL HERNIA: Primary | ICD-10-CM

## 2024-02-08 PROCEDURE — 99203 OFFICE O/P NEW LOW 30 MIN: CPT | Mod: GC | Performed by: SURGERY

## 2024-02-08 ASSESSMENT — PAIN SCALES - GENERAL: PAINLEVEL: MILD PAIN (2)

## 2024-02-08 NOTE — LETTER
2/8/2024       RE: Geronimo Coats  [id number]  [place] [address]     Dear Colleague,    Thank you for referring your patient, Geronimo Coats, to the [facility] GENERAL SURGERY [facility] [place] at [facility]. Please see a copy of my visit note below.    GENERAL SURGERY - NEW PATIENT OFFICE VISIT      CC inguinal hernia    HPI  Geronimo Coats is a 63 year old year old year-old male with history of HTN, CKD, CVA (on daily aspirin), thrombocytopenia, and cirrhosis seen on US 7 months ago who presents with a left inguinal bulge.  This has been present for 2-3 years and had been getting progressively bigger.  It is occasionally painful, he can always push it back in.  He denies any n/v or changes in bowel habits.   He did  have an open right inguinal hernia repair in [facility] in the 1970s and has not had any issues with the right side since.  Can climb a flight of stairs without becoming SOB.  No history of bleeding/clotting or problems with anesthesia.    He has not been told that he has cirrhosis and it does not appear any further workup has been done since the US in July.  He does not drink.    Previsit Tests   none    PMH    Past Medical History:   Diagnosis Date    Right pontine stroke (H) 10/1/2022   HTN  CKD  Cirrhosis  GERD  Thrombocytopenia    ETOH No  TOB No  No other drugs    PSH    Past Surgical History:   Procedure Laterality Date    HERNIA REPAIR  1974    LAPAROSCOPIC CHOLECYSTECTOMY  2014       Physical examination  BMI 26  Gen: Well-appearing, interactive  HEENT: Atraumatic  Neck: trachea midline  Resp: Nonlabored breathing on RA  CV: wwp  Abd: soft  Groins: Well healed oblique scar in the R groin, no hernias on the R.  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  He as evidence of cirrhosis on an US last summer and has not had further workup or recent labs.    PLAN  I spent a total of 0 minutes with Mr. Coats, more than 50% of which were spent in counseling, coordination of care, and face-to-face time. I reviewed the plan as follows:    We will place a referral to hepatology to further evaluate his cirrhosis and medically optimize him for surgery prior to planning an open left inguinal hernia repair.    Seen and discussed with staff, Dr. Garnett.    Emily Gallegos  General Surgery PGY5      Attestation signed by Esteban Garnett MD at 2/8/2024  2:00 PM:  Staff note and  Physician Attestation     I, Esteban Garnett, saw this patient, discussed at length with the resident, and agree with the resident s findings and plan of care as documented in the resident s note.       I personally reviewed vital signs, medications, labs and imaging.     Key findings were: left inguinal hernia      Date of Service (when I saw the patient): 2/8/24      (Patient seen, interviewed and examined, I agree with the note above and the plan as outlined.)        Again, thank you for allowing me to participate in the care of your patient.      Sincerely,    Esteban Garnett MD

## 2024-02-08 NOTE — PROGRESS NOTES
GENERAL SURGERY - NEW PATIENT OFFICE VISIT      CC inguinal hernia    HPI  Geronimo Coats is a 63 year old year old year-old male with history of HTN, CKD, CVA (on daily aspirin), thrombocytopenia, and cirrhosis seen on US 7 months ago who presents with a left inguinal bulge.  This has been present for 2-3 years and had been getting progressively bigger.  It is occasionally painful, he can always push it back in.  He denies any n/v or changes in bowel habits.   He did  have an open right inguinal hernia repair in [facility] in the 1970s and has not had any issues with the right side since.  Can climb a flight of stairs without becoming SOB.  No history of bleeding/clotting or problems with anesthesia.    He has not been told that he has cirrhosis and it does not appear any further workup has been done since the US in July.  He does not drink.    Previsit Tests   none    PMH    Past Medical History:   Diagnosis Date    Right pontine stroke (H) 10/1/2022   HTN  CKD  Cirrhosis  GERD  Thrombocytopenia    ETOH No  TOB No  No other drugs    PSH    Past Surgical History:   Procedure Laterality Date    HERNIA REPAIR  1974    LAPAROSCOPIC CHOLECYSTECTOMY  2014       Physical examination  BMI 26  Gen: Well-appearing, interactive  HEENT: Atraumatic  Neck: trachea midline  Resp: Nonlabored breathing on RA  CV: wwp  Abd: soft  Groins: Well healed oblique scar in the R groin, no hernias on the R.  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  He as evidence of cirrhosis on an US last summer and has not had further workup or recent labs.    PLAN  I spent a total of 0 minutes with Mr. Coats, more than 50% of which were spent in counseling,  coordination of care, and face-to-face time. I reviewed the plan as follows:    We will place a referral to hepatology to further evaluate his cirrhosis and medically optimize him for surgery prior to planning an open left inguinal hernia repair.    Seen and discussed with staff, Dr. Garnett.    Emily Gallegos  General Surgery PGY5

## 2024-02-08 NOTE — PATIENT INSTRUCTIONS
You met with Dr. Esteban Garnett.      Today's visit instructions:    Please follow-up with Hepatology for work up on your liver disease. If they determine you are optimized for surgery, please call the Nurse Advice line listed below.     If you have questions please contact Lorene RN or Alison RN during regular clinic hours, Monday through Friday 7:30 AM - 4:00 PM, or you can contact us via [employer] at anytime.       If you have urgent needs after-hours, weekends, or holidays please call the hospital at [phone] and ask to speak with our on-call General Surgery Team.    Appointment [phone]  Nurse Advice (Lorene or Alison): [phone]   Surgery Scheduler (Saritha): [phone]  Fax: [phone]

## 2024-02-08 NOTE — NURSING NOTE
"Chief Complaint   Patient presents with    New Patient     Left inguinal hernia, cirrhosis       Vitals:    02/08/24 1212   BP: (!) 171/96   BP Location: Left arm   Patient Position: Sitting   Cuff Size: Adult Regular   Pulse: 63   SpO2: 98%   Weight: 72.3 kg (159 lb 4.8 oz)   Height: 1.664 m (5' 5.5\")       Body mass index is 26.11 kg/m .                          Nile Cesar, EMT    "

## 2024-03-01 NOTE — CONFIDENTIAL NOTE
DIAGNOSIS:   Cirrhosis of liver (H)    Appt Date:  04.18.2024     NOTES STATUS DETAILS   OFFICE NOTE from referring provider Internal 02.08.2024 Johnny Cowan DO    OFFICE NOTES from other specialists     DISCHARGE SUMMARY from hospital     MEDICATION LIST Internal    LIVER BIOSPY (IF APPLICABLE)      PATHOLOGY REPORTS      IMAGING     ENDOSCOPY (IF AVAILABLE)     COLONOSCOPY (IF AVAILABLE)     ULTRASOUND LIVER Internal 07.27.2023 US Abd Complete   CT OF ABDOMEN     MRI OF LIVER     FIBROSCAN, US ELASTOGRAPHY, FIBROSIS SCAN, MR ELASTOGRAPHY     LABS     HEPATIC PANEL (LIVER PANEL) Internal 11.24.2020   BASIC METABOLIC PANEL Internal 07.24.2023    COMPLETE METABOLIC PANEL Internal 05.22.2023   COMPLETE BLOOD COUNT (CBC) Internal 05.22.2023    INTERNATIONAL NORMALIZED RATIO (INR) Internal 10.01.2022   HEPATITIS C ANTIBODY     HEPATITIS C VIRAL LOAD/PCR     HEPATITIS C GENOTYPE     HEPATITIS B SURFACE ANTIGEN Internal 11.24.2020   HEPATITIS B SURFACE ANTIBODY Internal 11.24.2020   HEPATITIS B DNA QUANT LEVEL     HEPATITIS B CORE ANTIBODY Internal 11.24.2020

## 2024-03-14 ENCOUNTER — TRANSFERRED RECORDS (OUTPATIENT)
Dept: HEALTH INFORMATION MANAGEMENT | Facility: CLINIC | Age: 64
End: 2024-03-14
Payer: MEDICARE

## 2024-03-14 ENCOUNTER — MYC MEDICAL ADVICE (OUTPATIENT)
Dept: FAMILY MEDICINE | Facility: CLINIC | Age: 64
End: 2024-03-14
Payer: MEDICARE

## 2024-03-14 NOTE — LETTER
March 14, 2024    To  Geronimo Coats  [address]  [place] [address]    Your team at [facility] cares about your health. We have reviewed your chart and based on our findings; we are making the following recommendations to better manage your health.     You are in particular need of attention regarding the following:     Schedule a DIABETIC FOLLOW UP appointment for Lab Only Visit. Patients with diabetes should see their provider regularly.  HYPERTENSION FOLLOW UP: Office Visit  PREVENTATIVE VISIT: Annual Medicare Wellness:Schedule an Annual Medicare Wellness Exam. Please call your [facility] clinic to set up your appointment.  Please schedule a Nurse Only Appointment with your primary care clinic to update your immunizations that are due.    If you have already completed these items, please contact the clinic via phone or   MyChart so your care team can review and update your records. Thank you for   choosing [facility] Clinics for your healthcare needs. For any questions,   concerns, or to schedule an appointment please contact our clinic.    Healthy Regards,      Your [facility] Care Team

## 2024-03-14 NOTE — TELEPHONE ENCOUNTER
Patient Quality Outreach    Patient is due for the following:   Diabetes -  Microalbumin  Hypertension -  Hypertension follow-up visit  Physical Annual Wellness Visit      Topic Date Due    Hepatitis A Vaccine (1 of 2 - Risk 2-dose series) Never done    Zoster (Shingles) Vaccine (1 of 2) Never done    Hepatitis B Vaccine (1 of 3 - Risk 3-dose series) Never done    Pneumococcal Vaccine (2 of 2 - PCV) 05/28/2023    COVID-19 Vaccine (5 - 2023-24 season) 09/01/2023       Next Steps:   Schedule a Annual Wellness Visit    Type of outreach:    Sent letter to patient      Questions for provider review:    None           Shannon Jacques CMA  Chart routed to Care Team.

## 2024-03-15 ENCOUNTER — TELEPHONE (OUTPATIENT)
Dept: FAMILY MEDICINE | Facility: CLINIC | Age: 64
End: 2024-03-15
Payer: MEDICARE

## 2024-04-15 ENCOUNTER — TELEPHONE (OUTPATIENT)
Dept: GASTROENTEROLOGY | Facility: CLINIC | Age: 64
End: 2024-04-15
Payer: MEDICARE

## 2024-04-15 DIAGNOSIS — K74.60 CIRRHOSIS OF LIVER WITHOUT ASCITES, UNSPECIFIED HEPATIC CIRRHOSIS TYPE (H): Primary | ICD-10-CM

## 2024-04-15 DIAGNOSIS — F55.8 ABUSE OF OTHER NON-PSYCHOACTIVE SUBSTANCES: ICD-10-CM

## 2024-04-15 DIAGNOSIS — Z11.59 ENCOUNTER FOR SCREENING FOR OTHER VIRAL DISEASES: ICD-10-CM

## 2024-04-15 NOTE — PROGRESS NOTES
[facility] Hepatology    New Patient Visit    Referring provider:  Esteban Garnett  Chief complaint:  Liver Disease, concern for cirrhosis     Assessment  63 year old male with past medical history of hypertension, hyperlipidemia, preDM, CKD, and history of CVA.  Prior testing demonstrated hepatitis B surface antigen positivity, however subsequent imaging demonstrated hepatitis B surface antigen negative.    #. Concern for advanced fibrosis vs cirrhosis  #. Hepatitis B s Ag positive (2019)   Patient with cross-sectional imaging with concern for cirrhosis since 2019.  Patient never underwent ultrasound elastography which was ordered in 2020.  Patient with platelet count less than 150 which raises the concern for portal hypertension; however spleen was within normal limits on ultrasound in 2020.  INR elevated to 1.22, raising the concern for hepatic to synthetic dysfunction. Given the concern for advanced fibrosis we will pursue upper endoscopy for variceal screening    In terms of etiology of advanced fibrosis versus cirrhosis there is a concern for hepatitis B given past surface antigen positivity however the year following the patient's hepatitis B surface Ag was negative. Unclear hepatitis delta status.  The patient is also at increased risk of metabolic associated steatotic liver disease in the setting of being overweight, hypertension, preDM, hyperlipidemia and history of CVA.  Plan for testing for other etiologies of liver disease and for further hepatitis B and hepatitis delta studies. Also plan to obtain [facility] records regarding Hep B s Ag status    #. 9 mm echogenic lesion within the left hepatic lobe  #. Metabolic syndrome  #. Constipation     Plan  -- Follow up labs: CBC, CMP and INR along with: hepatitis B studies, hepatitis delta, hepatitis A, F-actin, RIMA, iron studies, peripheral smear  -- MRI abdomen to evaluate 9 mm echogenic lesion within the left hepatic lobe  -- Variceal Screening next  due: now; ordered   -- Pending the above may consider liver biopsy for further evaluation   -- Lifestyle and dietary changes to promote a healthy weight, appropriate blood pressure and manage hyperlipidemia.  Patient to follow-up with primary care doctor regarding metabolic syndrome management  -- Prescribed psyllium daily.  Advised patient to take with plenty of water  -- [place] records requested     Health Maintenance:  -- Recommend yearly influenza vaccination   -- Colon Cancer Screening: due, ordered     RTC: 6 months    Shannon Mcnair MD (Lizzie)  Advanced & Transplant Hepatology  [facility]    I spent 60 minutes on this encounter performing the following: reviewing the patient's medical record (clinic visits, hospital records, lab results, imaging and procedural documentation), history taking, physical exam and documentation on the date of the encounter. I also spent part of the time in coordination of care and counseling.     HPI:    Seen by Dr. Thomas Leventhal in 2020.    Noted that he had hepatitis labs checked in June 2019 which demonstrated positive surface antigen and positive core antibody.  Labs were rechecked in November 2020 which now demonstrated surface antigen was negative, surface antibody was not protective at 0.79, core antibody was positive.  Abdominal ultrasound was performed in June 2019 demonstrated heterogeneous echotexture and lobular contour of the liver without intrahepatic lesion identified.  The spleen measured 13 cm on this exam.     ----    Patient presented alone.    He reports never being told that he had hepatitis B and never being told that he had any form of liver disease.     He denies any signs or symptoms of decompensated liver disease such as abdominal swelling, lower extremity swelling, melena, lethargy or confusion.    He reports doing generally well.  He denies any abdominal pain.  He will occasionally get heartburn for which he takes Tums.   His heartburn is exacerbated by food intake.  He sometimes will have constipation with straining and a small amount of blood when he wipes.    His weight has been stable around 158 pounds.  He denies any nausea or vomiting.  He has hypertension and hyperlipidemia for which he takes medications    He was born in [place] and immigrated in [place].  He denies any known family history of liver disease or liver cancer.  He has 1 sibling who lives in the [place].  He has 7 children who live in [place] still.  He currently lives with his wife.    He denies any history of alcohol overuse or tobacco.  He denies any history of drug use or tattoos.    Medical hx Surgical hx   Past Medical History:   Diagnosis Date    Benign essential hypertension     Hyperlipidemia     Right pontine stroke (H) 10/01/2022      Past Surgical History:   Procedure Laterality Date    HERNIA REPAIR  1974    LAPAROSCOPIC CHOLECYSTECTOMY  2014          Medications  Current Outpatient Medications   Medication Sig Dispense Refill    amLODIPine (NORVASC) 10 MG tablet TAKE ONE TABLET BY MOUTH ONCE DAILY 90 tablet 3    aspirin (ASA) 81 MG EC tablet Take 1 tablet (81 mg) by mouth daily 90 tablet 4    atorvastatin (LIPITOR) 20 MG tablet Take 1 tablet (20 mg) by mouth every evening 90 tablet 3    carvedilol (COREG) 12.5 MG tablet Take 1 tablet (12.5 mg) by mouth 2 times daily (with meals) 180 tablet 4    cloNIDine (CATAPRES) 0.1 MG tablet Take 1 tablet (0.1 mg) by mouth 2 times daily 180 tablet 3    losartan (COZAAR) 100 MG tablet Take 1 tablet (100 mg) by mouth daily 90 tablet 3    Multiple Vitamins-Minerals (ONE-A-DAY MENS 50+ PO) Take 1 tablet by mouth daily      psyllium (METAMUCIL/KONSYL) capsule Take 1 capsule by mouth daily 90 capsule 3    sildenafil (VIAGRA) 50 MG tablet Take 1 tablet (50 mg) by mouth daily as needed (intercourse) 20 tablet 4    docusate sodium (COLACE) 100 MG capsule Take 1 capsule (100 mg) by mouth 2 times daily as needed for constipation  "(Patient not taking: Reported on 4/18/2024) 60 capsule 3       Allergies  Allergies   Allergen Reactions    Chocolate     Nuts     Orange Fruit [[place]] Cough       Family hx Social hx   Family History   Problem Relation Age of Onset    Hypertension Sister     Liver Disease No family hx of     Cirrhosis No family hx of       Social History     Tobacco Use    Smoking status: Never     Passive exposure: Never    Smokeless tobacco: Never   Vaping Use    Vaping status: Never Used   Substance Use Topics    Alcohol use: Yes     Comment: rarely 2-3 times per year    Drug use: Never     - 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  Has 7 children who are still in [facility].  Born in [place] and immigrated in 2012.  - Alcohol: Denies. No hx of overuse   - Tobacco: Denies  - Drugs: Denies       Review of systems  A 10-point review of systems was negative.    Examination  /77 (BP Location: Right arm, Patient Position: Sitting, Cuff Size: Adult Regular)   Pulse 77   Temp 97.9  F (36.6  C) (Oral)   Resp 16   Ht 1.664 m (5' 5.51\")   Wt 71.9 kg (158 lb 9.6 oz)   SpO2 98%   BMI 25.98 kg/m     Body mass index is 25.98 kg/m .    Gen-NAD  Eye- EOMI  ENT- MMM  CVS- RRR, no murmurs  RS- CTA bilaterally  Abd- Soft, non-tender, non-distended   Extr- 2+ radial pulses bilaterally, no lower extremity edema bilaterally  MS- hands without clubbing  Neuro- A+Ox3, no asterixis  Skin- no jaundice  Psych- normal mood    Laboratory  Pending labs     Hep B c Ab reactive  Hep B s Ab < 8 - non-immune (11/2020)  Hep B s Ag non-reactive (2020), but was reactive 2019  Hep C Ab non-reactive (2019)    Radiology  Abdominal US 7/2023  1.  Cirrhotic morphology of the liver. There is a 9 mm echogenic lesion within the left hepatic lobe which was not previously seen. Additional echogenic foci within the right hepatic lobe. Recommend liver MRI with and without contrast for further evaluation.  2.  Mildly dilated " common bile duct, nonspecific in the postcholecystectomy state. Correlate with clinical and laboratory values.  3.  Borderline aneurysmal dilatation of the proximal abdominal aorta. See follow-up recommendations below.  4.  Bilateral benign renal simple cysts which do not require dedicated follow-up.    Abdominal US 6/2019:  Ultrasound findings suggest cirrhosis of the liver.    Endoscopy  None available for review

## 2024-04-15 NOTE — TELEPHONE ENCOUNTER
Was the patient contacted by phone and reminded of the upcoming visit? Yes    Was the patient instructed to bring a current list of all medications to the appointment or instructed to bring in all medication bottles? Yes, patient verbalized understanding    Was the patient instructed to arrive prior to the appointment time to have ordered labs drawn? Yes, patient verbalized understanding    Were the needed lab orders placed? Yes    Was lab appointment made 1 hour or more prior to visit? Writer made lab appointment, he verbally understood and agreed.     Soheila Connor [facility]  4/15/2024 3:08 PM

## 2024-04-16 ENCOUNTER — TELEPHONE (OUTPATIENT)
Dept: GASTROENTEROLOGY | Facility: CLINIC | Age: 64
End: 2024-04-16
Payer: MEDICARE

## 2024-04-18 ENCOUNTER — PRE VISIT (OUTPATIENT)
Dept: GASTROENTEROLOGY | Facility: CLINIC | Age: 64
End: 2024-04-18
Payer: MEDICARE

## 2024-04-18 ENCOUNTER — OFFICE VISIT (OUTPATIENT)
Dept: GASTROENTEROLOGY | Facility: CLINIC | Age: 64
End: 2024-04-18
Attending: SURGERY
Payer: MEDICARE

## 2024-04-18 ENCOUNTER — LAB (OUTPATIENT)
Dept: LAB | Facility: CLINIC | Age: 64
End: 2024-04-18
Payer: MEDICARE

## 2024-04-18 VITALS
WEIGHT: 158.6 LBS | OXYGEN SATURATION: 98 % | TEMPERATURE: 97.9 F | HEART RATE: 77 BPM | BODY MASS INDEX: 25.49 KG/M2 | HEIGHT: 66 IN | SYSTOLIC BLOOD PRESSURE: 123 MMHG | DIASTOLIC BLOOD PRESSURE: 77 MMHG | RESPIRATION RATE: 16 BRPM

## 2024-04-18 DIAGNOSIS — D69.6 THROMBOCYTOPENIA (H): ICD-10-CM

## 2024-04-18 DIAGNOSIS — Z11.59 ENCOUNTER FOR SCREENING FOR OTHER VIRAL DISEASES: ICD-10-CM

## 2024-04-18 DIAGNOSIS — K59.00 CONSTIPATION, UNSPECIFIED CONSTIPATION TYPE: Primary | ICD-10-CM

## 2024-04-18 DIAGNOSIS — F55.8 ABUSE OF OTHER NON-PSYCHOACTIVE SUBSTANCES: ICD-10-CM

## 2024-04-18 DIAGNOSIS — R79.0 ABNORMAL IRON SATURATION: ICD-10-CM

## 2024-04-18 DIAGNOSIS — E83.10 DISORDER OF IRON METABOLISM, UNSPECIFIED: ICD-10-CM

## 2024-04-18 DIAGNOSIS — D64.9 ANEMIA, UNSPECIFIED TYPE: ICD-10-CM

## 2024-04-18 DIAGNOSIS — K74.60 CIRRHOSIS OF LIVER (H): Primary | ICD-10-CM

## 2024-04-18 DIAGNOSIS — N18.2 CHRONIC KIDNEY DISEASE, STAGE 2 (MILD): ICD-10-CM

## 2024-04-18 DIAGNOSIS — K74.60 CIRRHOSIS OF LIVER WITHOUT ASCITES, UNSPECIFIED HEPATIC CIRRHOSIS TYPE (H): ICD-10-CM

## 2024-04-18 LAB
ALBUMIN SERPL BCG-MCNC: 4.4 G/DL (ref 3.5–5.2)
ALP SERPL-CCNC: 105 U/L (ref 40–150)
ALT SERPL W P-5'-P-CCNC: 43 U/L (ref 0–70)
ANION GAP SERPL CALCULATED.3IONS-SCNC: 12 MMOL/L (ref 7–15)
AST SERPL W P-5'-P-CCNC: 57 U/L (ref 0–45)
BASOPHILS # BLD AUTO: 0 10E3/UL (ref 0–0.2)
BASOPHILS NFR BLD AUTO: 0 %
BILIRUB DIRECT SERPL-MCNC: 0.24 MG/DL (ref 0–0.3)
BILIRUB SERPL-MCNC: 0.9 MG/DL
BUN SERPL-MCNC: 17.4 MG/DL (ref 8–23)
CALCIUM SERPL-MCNC: 9.8 MG/DL (ref 8.8–10.2)
CHLORIDE SERPL-SCNC: 107 MMOL/L (ref 98–107)
CREAT SERPL-MCNC: 1.41 MG/DL (ref 0.67–1.17)
DEPRECATED HCO3 PLAS-SCNC: 26 MMOL/L (ref 22–29)
EGFRCR SERPLBLD CKD-EPI 2021: 55 ML/MIN/1.73M2
EOSINOPHIL # BLD AUTO: 0.1 10E3/UL (ref 0–0.7)
EOSINOPHIL NFR BLD AUTO: 2 %
ERYTHROCYTE [DISTWIDTH] IN BLOOD BY AUTOMATED COUNT: 13.4 % (ref 10–15)
FERRITIN SERPL-MCNC: 166 NG/ML (ref 31–409)
GLUCOSE SERPL-MCNC: 72 MG/DL (ref 70–99)
HCT VFR BLD AUTO: 46.6 % (ref 40–53)
HGB BLD-MCNC: 15.8 G/DL (ref 13.3–17.7)
IMM GRANULOCYTES # BLD: 0 10E3/UL
IMM GRANULOCYTES NFR BLD: 0 %
INR PPP: 1.22 (ref 0.85–1.15)
IRON BINDING CAPACITY (ROCHE): 288 UG/DL (ref 240–430)
IRON SATN MFR SERPL: 48 % (ref 15–46)
IRON SERPL-MCNC: 137 UG/DL (ref 61–157)
LYMPHOCYTES # BLD AUTO: 1.5 10E3/UL (ref 0.8–5.3)
LYMPHOCYTES NFR BLD AUTO: 50 %
MCH RBC QN AUTO: 28.8 PG (ref 26.5–33)
MCHC RBC AUTO-ENTMCNC: 33.9 G/DL (ref 31.5–36.5)
MCV RBC AUTO: 85 FL (ref 78–100)
MONOCYTES # BLD AUTO: 0.3 10E3/UL (ref 0–1.3)
MONOCYTES NFR BLD AUTO: 10 %
NEUTROPHILS # BLD AUTO: 1.2 10E3/UL (ref 1.6–8.3)
NEUTROPHILS NFR BLD AUTO: 38 %
NRBC # BLD AUTO: 0 10E3/UL
NRBC BLD AUTO-RTO: 0 /100
PLATELET # BLD AUTO: 95 10E3/UL (ref 150–450)
POTASSIUM SERPL-SCNC: 3.5 MMOL/L (ref 3.4–5.3)
PROT SERPL-MCNC: 7.7 G/DL (ref 6.4–8.3)
RBC # BLD AUTO: 5.49 10E6/UL (ref 4.4–5.9)
RETICS # AUTO: 0.07 10E6/UL (ref 0.03–0.1)
RETICS/RBC NFR AUTO: 1.3 % (ref 0.5–2)
SODIUM SERPL-SCNC: 145 MMOL/L (ref 135–145)
WBC # BLD AUTO: 3.1 10E3/UL (ref 4–11)

## 2024-04-18 PROCEDURE — 87517 HEPATITIS B DNA QUANT: CPT | Performed by: INTERNAL MEDICINE

## 2024-04-18 PROCEDURE — 86803 HEPATITIS C AB TEST: CPT | Performed by: INTERNAL MEDICINE

## 2024-04-18 PROCEDURE — G0480 DRUG TEST DEF 1-7 CLASSES: HCPCS | Mod: 90 | Performed by: PATHOLOGY

## 2024-04-18 PROCEDURE — 86707 HEPATITIS BE ANTIBODY: CPT | Mod: 90 | Performed by: PATHOLOGY

## 2024-04-18 PROCEDURE — 83516 IMMUNOASSAY NONANTIBODY: CPT | Mod: 90 | Performed by: PATHOLOGY

## 2024-04-18 PROCEDURE — 99205 OFFICE O/P NEW HI 60 MIN: CPT | Performed by: INTERNAL MEDICINE

## 2024-04-18 PROCEDURE — 87350 HEPATITIS BE AG IA: CPT | Mod: 90 | Performed by: PATHOLOGY

## 2024-04-18 PROCEDURE — 86706 HEP B SURFACE ANTIBODY: CPT | Performed by: INTERNAL MEDICINE

## 2024-04-18 PROCEDURE — 86704 HEP B CORE ANTIBODY TOTAL: CPT | Performed by: INTERNAL MEDICINE

## 2024-04-18 PROCEDURE — 85045 AUTOMATED RETICULOCYTE COUNT: CPT | Performed by: PATHOLOGY

## 2024-04-18 PROCEDURE — 36415 COLL VENOUS BLD VENIPUNCTURE: CPT | Performed by: PATHOLOGY

## 2024-04-18 PROCEDURE — 82105 ALPHA-FETOPROTEIN SERUM: CPT | Performed by: INTERNAL MEDICINE

## 2024-04-18 PROCEDURE — 87340 HEPATITIS B SURFACE AG IA: CPT | Performed by: INTERNAL MEDICINE

## 2024-04-18 PROCEDURE — 86038 ANTINUCLEAR ANTIBODIES: CPT | Performed by: INTERNAL MEDICINE

## 2024-04-18 PROCEDURE — 82728 ASSAY OF FERRITIN: CPT | Performed by: PATHOLOGY

## 2024-04-18 PROCEDURE — 85025 COMPLETE CBC W/AUTO DIFF WBC: CPT | Performed by: PATHOLOGY

## 2024-04-18 PROCEDURE — 85610 PROTHROMBIN TIME: CPT | Performed by: PATHOLOGY

## 2024-04-18 PROCEDURE — 82103 ALPHA-1-ANTITRYPSIN TOTAL: CPT | Mod: 90 | Performed by: PATHOLOGY

## 2024-04-18 PROCEDURE — 83540 ASSAY OF IRON: CPT | Performed by: PATHOLOGY

## 2024-04-18 PROCEDURE — 83550 IRON BINDING TEST: CPT | Performed by: PATHOLOGY

## 2024-04-18 PROCEDURE — 86705 HEP B CORE ANTIBODY IGM: CPT | Performed by: INTERNAL MEDICINE

## 2024-04-18 PROCEDURE — 99207 BLOOD MORPHOLOGY PATHOLOGIST REVIEW: CPT | Performed by: STUDENT IN AN ORGANIZED HEALTH CARE EDUCATION/TRAINING PROGRAM

## 2024-04-18 PROCEDURE — 81332 SERPINA1 GENE: CPT | Mod: 90 | Performed by: PATHOLOGY

## 2024-04-18 PROCEDURE — 82043 UR ALBUMIN QUANTITATIVE: CPT | Performed by: INTERNAL MEDICINE

## 2024-04-18 PROCEDURE — 86692 HEPATITIS DELTA AGENT ANTBDY: CPT | Mod: 90 | Performed by: PATHOLOGY

## 2024-04-18 PROCEDURE — G0463 HOSPITAL OUTPT CLINIC VISIT: HCPCS | Performed by: INTERNAL MEDICINE

## 2024-04-18 PROCEDURE — 86708 HEPATITIS A ANTIBODY: CPT | Performed by: INTERNAL MEDICINE

## 2024-04-18 PROCEDURE — 82248 BILIRUBIN DIRECT: CPT | Performed by: PATHOLOGY

## 2024-04-18 PROCEDURE — 80053 COMPREHEN METABOLIC PANEL: CPT | Performed by: PATHOLOGY

## 2024-04-18 PROCEDURE — 99000 SPECIMEN HANDLING OFFICE-LAB: CPT | Performed by: PATHOLOGY

## 2024-04-18 ASSESSMENT — PAIN SCALES - GENERAL: PAINLEVEL: NO PAIN (0)

## 2024-04-18 NOTE — NURSING NOTE
"Chief Complaint   Patient presents with    Consult     Abnormal liver imaging, possible cirrhosis      Vital signs:  Temp: 97.9  F (36.6  C) Temp src: Oral BP: 123/77 Pulse: 77   Resp: 16 SpO2: 98 %     Height: 166.4 cm (5' 5.51\") Weight: 71.9 kg (158 lb 9.6 oz) (with coat and shoes on)  Estimated body mass index is 25.98 kg/m  as calculated from the following:    Height as of this encounter: 1.664 m (5' 5.51\").    Weight as of this encounter: 71.9 kg (158 lb 9.6 oz).      Sohiela Connor, [facility]  4/18/2024 12:55 PM    "

## 2024-04-18 NOTE — PATIENT INSTRUCTIONS
It was a pleasure taking care of you today.  I've included a brief summary of our discussion and care plan from today's visit below.  Please review this information with your primary care provider.  _______________________________________________________________________    My recommendations are summarized as follows:    - Labs today  - Upper endoscopy for variceal screening   - Colonoscopy for screening for colon cancer screening  - Psyllium/fiber daily to help with constipation. Make sure you take with plenty of water   - MRI of your liver  - Recommend scheduling an appointment with primary care doctor     Return to Liver/Hepatology Clinic in 6 months.    _______________________________________________________________________    Scheduling Procedures or Tests  - To schedule endoscopic procedures call: [phone]  - To schedule radiology tests call: [phone] (for [facility]) or [phone] (for [facility])   _______________________________________________________________________    Who do I call with any questions after my visit?  Please be in touch if there are any further questions that arise following today's visit.  There are multiple ways to contact your gastroenterology care team.      To schedule or reschedule an appointment, please call [phone] and choose option 2 (for hepatology) and then option 1 (for scheduling).    During business hours, you may reach a nurse by calling the appointment line ([phone]) and asking to speak with a nurse    You can send a secure message through [employer] for non-urgent questions. MyChart messages are answered by your nurse or doctor typically within 24 to 48 hours. Please allow extra time on weekends and holidays.      For urgent/emergent questions after business hours, you may reach the on-call GI Fellow by contacting the [facility]  at [phone].     How will I get the results of any tests ordered?    - If you have signed  up for MyChart, any tests ordered at your visit will be available to you after your physician reviews them.  Typically this takes 1-2 weeks.    - If you do not have MyChart, your results will either be mailed to you as a letter or via a telephone call.  - If there are urgent results that require a change in your care plan, your physician or nurse will call you to discuss the next steps.     What is [employer]?  [employer] is a secure way for you to access all of your healthcare records from the [facility].  It is a web based computer program, so you can sign on to it from any location.  It also allows you to send secure messages to your care team.  I recommend signing up for [employer] access if you have not already done so and are comfortable with using a computer.      How to I schedule a follow-up visit?  If you did not schedule a follow-up visit today, please call [phone] to schedule a follow-up office visit.     Sincerely,    Shannon Mcnair MD (Lizzie)   of Medicine  Advanced & Transplant Hepatology  [facility]

## 2024-04-18 NOTE — LETTER
4/18/2024         RE: Geronimo Coats  [id number]  [place] [address]        Dear Colleague,    Thank you for referring your patient, Geronimo Coats, to the [facility] [place]. Please see a copy of my visit note below.    [facility] Hepatology    New Patient Visit    Referring provider:  Esteban Garnett  Chief complaint:  Liver Disease, concern for cirrhosis     Assessment  63 year old male with past medical history of hypertension, hyperlipidemia, preDM, CKD, and history of CVA.  Prior testing demonstrated hepatitis B surface antigen positivity, however subsequent imaging demonstrated hepatitis B surface antigen negative.    #. Concern for advanced fibrosis vs cirrhosis  #. Hepatitis B s Ag positive (2019)   Patient with cross-sectional imaging with concern for cirrhosis since 2019.  Patient never underwent ultrasound elastography which was ordered in 2020.  Patient with platelet count less than 150 which raises the concern for portal hypertension; however spleen was within normal limits on ultrasound in 2020.  INR elevated to 1.22, raising the concern for hepatic to synthetic dysfunction. Given the concern for advanced fibrosis we will pursue upper endoscopy for variceal screening    In terms of etiology of advanced fibrosis versus cirrhosis there is a concern for hepatitis B given past surface antigen positivity however the year following the patient's hepatitis B surface Ag was negative. Unclear hepatitis delta status.  The patient is also at increased risk of metabolic associated steatotic liver disease in the setting of being overweight, hypertension, preDM, hyperlipidemia and history of CVA.  Plan for testing for other etiologies of liver disease and for further hepatitis B and hepatitis delta studies. Also plan to obtain [facility] records regarding Hep B s Ag status    #. 9 mm echogenic lesion within the left hepatic lobe  #. Metabolic  syndrome  #. Constipation     Plan  -- Follow up labs: CBC, CMP and INR along with: hepatitis B studies, hepatitis delta, hepatitis A, F-actin, RIMA, iron studies, peripheral smear  -- MRI abdomen to evaluate 9 mm echogenic lesion within the left hepatic lobe  -- Variceal Screening next due: now; ordered   -- Pending the above may consider liver biopsy for further evaluation   -- Lifestyle and dietary changes to promote a healthy weight, appropriate blood pressure and manage hyperlipidemia.  Patient to follow-up with primary care doctor regarding metabolic syndrome management  -- Prescribed psyllium daily.  Advised patient to take with plenty of water  -- [place] records requested     Health Maintenance:  -- Recommend yearly influenza vaccination   -- Colon Cancer Screening: due, ordered     RTC: 6 months    Shannon Mcnair MD (Lizzie)  Advanced & Transplant Hepatology  [facility]    I spent 60 minutes on this encounter performing the following: reviewing the patient's medical record (clinic visits, hospital records, lab results, imaging and procedural documentation), history taking, physical exam and documentation on the date of the encounter. I also spent part of the time in coordination of care and counseling.     HPI:    Seen by Dr. Thomas Leventhal in 2020.    Noted that he had hepatitis labs checked in June 2019 which demonstrated positive surface antigen and positive core antibody.  Labs were rechecked in November 2020 which now demonstrated surface antigen was negative, surface antibody was not protective at 0.79, core antibody was positive.  Abdominal ultrasound was performed in June 2019 demonstrated heterogeneous echotexture and lobular contour of the liver without intrahepatic lesion identified.  The spleen measured 13 cm on this exam.     ----    Patient presented alone.    He reports never being told that he had hepatitis B and never being told that he had any form of liver  disease.     He denies any signs or symptoms of decompensated liver disease such as abdominal swelling, lower extremity swelling, melena, lethargy or confusion.    He reports doing generally well.  He denies any abdominal pain.  He will occasionally get heartburn for which he takes Tums.  His heartburn is exacerbated by food intake.  He sometimes will have constipation with straining and a small amount of blood when he wipes.    His weight has been stable around 158 pounds.  He denies any nausea or vomiting.  He has hypertension and hyperlipidemia for which he takes medications    He was born in [place] and immigrated in [place].  He denies any known family history of liver disease or liver cancer.  He has 1 sibling who lives in the [place].  He has 7 children who live in [place] still.  He currently lives with his wife.    He denies any history of alcohol overuse or tobacco.  He denies any history of drug use or tattoos.    Medical hx Surgical hx   Past Medical History:   Diagnosis Date     Benign essential hypertension      Hyperlipidemia      Right pontine stroke (H) 10/01/2022      Past Surgical History:   Procedure Laterality Date     HERNIA REPAIR  1974     LAPAROSCOPIC CHOLECYSTECTOMY  2014          Medications  Current Outpatient Medications   Medication Sig Dispense Refill     amLODIPine (NORVASC) 10 MG tablet TAKE ONE TABLET BY MOUTH ONCE DAILY 90 tablet 3     aspirin (ASA) 81 MG EC tablet Take 1 tablet (81 mg) by mouth daily 90 tablet 4     atorvastatin (LIPITOR) 20 MG tablet Take 1 tablet (20 mg) by mouth every evening 90 tablet 3     carvedilol (COREG) 12.5 MG tablet Take 1 tablet (12.5 mg) by mouth 2 times daily (with meals) 180 tablet 4     cloNIDine (CATAPRES) 0.1 MG tablet Take 1 tablet (0.1 mg) by mouth 2 times daily 180 tablet 3     losartan (COZAAR) 100 MG tablet Take 1 tablet (100 mg) by mouth daily 90 tablet 3     Multiple Vitamins-Minerals (ONE-A-DAY MENS 50+ PO) Take 1 tablet by mouth daily        "psyllium (METAMUCIL/KONSYL) capsule Take 1 capsule by mouth daily 90 capsule 3     sildenafil (VIAGRA) 50 MG tablet Take 1 tablet (50 mg) by mouth daily as needed (intercourse) 20 tablet 4     docusate sodium (COLACE) 100 MG capsule Take 1 capsule (100 mg) by mouth 2 times daily as needed for constipation (Patient not taking: Reported on 4/18/2024) 60 capsule 3       Allergies  Allergies   Allergen Reactions     Chocolate      Nuts      Orange Fruit [Day] Cough       Family hx Social hx   Family History   Problem Relation Age of Onset     Hypertension Sister      Liver Disease No family hx of      Cirrhosis No family hx of       Social History     Tobacco Use     Smoking status: Never     Passive exposure: Never     Smokeless tobacco: Never   Vaping Use     Vaping status: Never Used   Substance Use Topics     Alcohol use: Yes     Comment: rarely 2-3 times per year     Drug use: Never     - 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  Has 7 children who are still in [place].  Born in [place] and immigrated in [place].  - Alcohol: Denies. No hx of overuse   - Tobacco: Denies  - Drugs: Denies       Review of systems  A 10-point review of systems was negative.    Examination  /77 (BP Location: Right arm, Patient Position: Sitting, Cuff Size: Adult Regular)   Pulse 77   Temp 97.9  F (36.6  C) (Oral)   Resp 16   Ht 1.664 m (5' 5.51\")   Wt 71.9 kg (158 lb 9.6 oz)   SpO2 98%   BMI 25.98 kg/m     Body mass index is 25.98 kg/m .    Gen-NAD  Eye- EOMI  ENT- MMM  CVS- RRR, no murmurs  RS- CTA bilaterally  Abd- Soft, non-tender, non-distended   Extr- 2+ radial pulses bilaterally, no lower extremity edema bilaterally  MS- hands without clubbing  Neuro- A+Ox3, no asterixis  Skin- no jaundice  Psych- normal mood    Laboratory  Pending labs     Hep B c Ab reactive  Hep B s Ab < 8 - non-immune (11/2020)  Hep B s Ag non-reactive (2020), but was reactive 2019  Hep C Ab non-reactive " (2019)    Radiology  Abdominal US 7/2023  1.  Cirrhotic morphology of the liver. There is a 9 mm echogenic lesion within the left hepatic lobe which was not previously seen. Additional echogenic foci within the right hepatic lobe. Recommend liver MRI with and without contrast for further evaluation.  2.  Mildly dilated common bile duct, nonspecific in the postcholecystectomy state. Correlate with clinical and laboratory values.  3.  Borderline aneurysmal dilatation of the proximal abdominal aorta. See follow-up recommendations below.  4.  Bilateral benign renal simple cysts which do not require dedicated follow-up.    Abdominal US 6/2019:  Ultrasound findings suggest cirrhosis of the liver.    Endoscopy  None available for review      Again, thank you for allowing me to participate in the care of your patient.        Sincerely,        Shannon Mcnair MD

## 2024-04-19 ENCOUNTER — TELEPHONE (OUTPATIENT)
Dept: MULTI SPECIALTY CLINIC | Facility: CLINIC | Age: 64
End: 2024-04-19
Payer: MEDICARE

## 2024-04-19 DIAGNOSIS — K74.60 CIRRHOSIS OF LIVER WITHOUT ASCITES, UNSPECIFIED HEPATIC CIRRHOSIS TYPE (H): Primary | ICD-10-CM

## 2024-04-19 DIAGNOSIS — B18.1 CHRONIC VIRAL HEPATITIS B WITHOUT DELTA AGENT AND WITHOUT COMA (H): ICD-10-CM

## 2024-04-19 LAB
AFP SERPL-MCNC: 2.9 NG/ML
ANA SER QL IF: NEGATIVE
CREAT UR-MCNC: 135 MG/DL
HAV AB SER QL IA: REACTIVE
HBV CORE AB SERPL QL IA: REACTIVE
HBV CORE IGM SERPL QL IA: NONREACTIVE
HBV DNA SERPL NAA+PROBE-ACNC: 115 IU/ML
HBV DNA SERPL NAA+PROBE-LOG IU: 2.1 {LOG_IU}/ML
HBV E AB SERPL QL IA: POSITIVE
HBV E AG SERPL QL IA: NEGATIVE
HBV SURFACE AB SERPL IA-ACNC: <3.5 M[IU]/ML
HBV SURFACE AB SERPL IA-ACNC: NONREACTIVE M[IU]/ML
HBV SURFACE AG SERPL QL IA: NONREACTIVE
HCV AB SERPL QL IA: NONREACTIVE
MICROALBUMIN UR-MCNC: 59.7 MG/L
MICROALBUMIN/CREAT UR: 44.22 MG/G CR (ref 0–17)

## 2024-04-19 RX ORDER — TENOFOVIR DISOPROXIL FUMARATE 300 MG/1
300 TABLET, FILM COATED ORAL DAILY
Qty: 90 TABLET | Refills: 3 | Status: ON HOLD | OUTPATIENT
Start: 2024-04-19 | End: 2024-10-02

## 2024-04-19 NOTE — TELEPHONE ENCOUNTER
Attempted to call the patient regarding his hepatitis B virus level. No answer and voicemail box is full, thus I will plan to send a letter.    He continues to have hepatitis B present in his blood at very low levels.  Pending additional HBV and HDV studies.    Given the concern for advanced fibrosis and cirrhosis and that he has elevated AST, he would benefit from initiating tenofovir therapy daily.  This has been prescribed to his local pharmacy.

## 2024-04-20 LAB
LABORATORY REPORT: NORMAL
PETH INTERPRETATION: NORMAL
PLPETH BLD-MCNC: <10 NG/ML
POPETH BLD-MCNC: <10 NG/ML
SMA IGG SER-ACNC: 13 UNITS

## 2024-04-22 DIAGNOSIS — B16.1 ACUTE HEPATITIS B WITH DELTA-AGENT WITHOUT HEPATIC COMA: Primary | ICD-10-CM

## 2024-04-22 DIAGNOSIS — T56.894A TOXIC EFFECT OF OTHER METALS, UNDETERMINED, INITIAL ENCOUNTER: ICD-10-CM

## 2024-04-22 LAB
A1AT PHENOTYP SERPL-IMP: NORMAL
A1AT SERPL-MCNC: 148 MG/DL
A1AT SS SERPL-MCNC: NEGATIVE G/L
A1AT SZ SERPL-MCNC: NORMAL G/L
A1AT ZZ SERPL-MCNC: NEGATIVE G/L
HDV AB SER QL IA: POSITIVE
PATH REPORT.COMMENTS IMP SPEC: NORMAL
PATH REPORT.COMMENTS IMP SPEC: NORMAL
PATH REPORT.FINAL DX SPEC: NORMAL
PATH REPORT.MICROSCOPIC SPEC OTHER STN: NORMAL
PATH REPORT.MICROSCOPIC SPEC OTHER STN: NORMAL
PATH REPORT.RELEVANT HX SPEC: NORMAL
SPECIMEN SOURCE: NORMAL

## 2024-04-23 DIAGNOSIS — T56.894A TOXIC EFFECT OF OTHER METALS, UNDETERMINED, INITIAL ENCOUNTER: ICD-10-CM

## 2024-04-23 DIAGNOSIS — B16.1: Primary | ICD-10-CM

## 2024-04-23 NOTE — CONFIDENTIAL NOTE
Patient is hepatitis delta Ab positive. Ordered HDV IgM and PCR to confirm. Will ask team to help contact the patient so he can return for labs

## 2024-04-25 ENCOUNTER — LAB (OUTPATIENT)
Dept: LAB | Facility: CLINIC | Age: 64
End: 2024-04-25
Payer: MEDICARE

## 2024-04-25 DIAGNOSIS — T56.894A TOXIC EFFECT OF OTHER METALS, UNDETERMINED, INITIAL ENCOUNTER: ICD-10-CM

## 2024-04-25 DIAGNOSIS — B16.1 ACUTE HEPATITIS B WITH DELTA-AGENT WITHOUT HEPATIC COMA: ICD-10-CM

## 2024-04-25 DIAGNOSIS — E83.10 DISORDER OF IRON METABOLISM, UNSPECIFIED: ICD-10-CM

## 2024-04-25 DIAGNOSIS — K74.60 CIRRHOSIS OF LIVER WITHOUT ASCITES, UNSPECIFIED HEPATIC CIRRHOSIS TYPE (H): ICD-10-CM

## 2024-04-25 DIAGNOSIS — B16.1: ICD-10-CM

## 2024-04-25 DIAGNOSIS — R79.0 ABNORMAL IRON SATURATION: ICD-10-CM

## 2024-04-25 DIAGNOSIS — B18.1 CHRONIC VIRAL HEPATITIS B WITHOUT DELTA AGENT AND WITHOUT COMA (H): ICD-10-CM

## 2024-04-25 LAB
AFP SERPL-MCNC: 3 NG/ML
ALBUMIN SERPL BCG-MCNC: 4.3 G/DL (ref 3.5–5.2)
ALP SERPL-CCNC: 96 U/L (ref 40–150)
ALT SERPL W P-5'-P-CCNC: 40 U/L (ref 0–70)
ANION GAP SERPL CALCULATED.3IONS-SCNC: 8 MMOL/L (ref 7–15)
AST SERPL W P-5'-P-CCNC: 40 U/L (ref 0–45)
BILIRUB DIRECT SERPL-MCNC: <0.2 MG/DL (ref 0–0.3)
BILIRUB SERPL-MCNC: 0.6 MG/DL
BUN SERPL-MCNC: 16.7 MG/DL (ref 8–23)
CALCIUM SERPL-MCNC: 9.6 MG/DL (ref 8.8–10.2)
CHLORIDE SERPL-SCNC: 105 MMOL/L (ref 98–107)
CREAT SERPL-MCNC: 1.37 MG/DL (ref 0.67–1.17)
DEPRECATED HCO3 PLAS-SCNC: 30 MMOL/L (ref 22–29)
EGFRCR SERPLBLD CKD-EPI 2021: 57 ML/MIN/1.73M2
ERYTHROCYTE [DISTWIDTH] IN BLOOD BY AUTOMATED COUNT: 13.2 % (ref 10–15)
GLUCOSE SERPL-MCNC: 96 MG/DL (ref 70–99)
HCT VFR BLD AUTO: 44.5 % (ref 40–53)
HGB BLD-MCNC: 15 G/DL (ref 13.3–17.7)
INR PPP: 1.24 (ref 0.85–1.15)
LAB DIRECTOR COMMENTS: NORMAL
LAB DIRECTOR DISCLAIMER: NORMAL
LAB DIRECTOR INTERPRETATION: NORMAL
LAB DIRECTOR METHODOLOGY: NORMAL
LAB DIRECTOR RESULTS: NORMAL
LOCATION OF TASK: NORMAL
Lab: NORMAL
Lab: NORMAL
MCH RBC QN AUTO: 29.1 PG (ref 26.5–33)
MCHC RBC AUTO-ENTMCNC: 33.7 G/DL (ref 31.5–36.5)
MCV RBC AUTO: 86 FL (ref 78–100)
PERFORMING LABORATORY: NORMAL
PERFORMING LABORATORY: NORMAL
PLATELET # BLD AUTO: 100 10E3/UL (ref 150–450)
POTASSIUM SERPL-SCNC: 3.8 MMOL/L (ref 3.4–5.3)
PROT SERPL-MCNC: 7.4 G/DL (ref 6.4–8.3)
RBC # BLD AUTO: 5.16 10E6/UL (ref 4.4–5.9)
SODIUM SERPL-SCNC: 143 MMOL/L (ref 135–145)
SPECIMEN DESCRIPTION: NORMAL
SPECIMEN STATUS: NORMAL
SPECIMEN STATUS: NORMAL
TEST NAME: NORMAL
TEST NAME: NORMAL
WBC # BLD AUTO: 2.8 10E3/UL (ref 4–11)

## 2024-04-25 PROCEDURE — G0452 MOLECULAR PATHOLOGY INTERPR: HCPCS | Mod: XU | Performed by: PATHOLOGY

## 2024-04-25 PROCEDURE — 81256 HFE GENE: CPT

## 2024-04-25 PROCEDURE — 86692 HEPATITIS DELTA AGENT ANTBDY: CPT | Mod: 59

## 2024-04-25 PROCEDURE — 86692 HEPATITIS DELTA AGENT ANTBDY: CPT | Mod: 90

## 2024-04-25 PROCEDURE — 82248 BILIRUBIN DIRECT: CPT

## 2024-04-25 PROCEDURE — 85610 PROTHROMBIN TIME: CPT

## 2024-04-25 PROCEDURE — 85027 COMPLETE CBC AUTOMATED: CPT

## 2024-04-25 PROCEDURE — 87517 HEPATITIS B DNA QUANT: CPT | Mod: 59

## 2024-04-25 PROCEDURE — 99000 SPECIMEN HANDLING OFFICE-LAB: CPT

## 2024-04-25 PROCEDURE — 80053 COMPREHEN METABOLIC PANEL: CPT

## 2024-04-25 PROCEDURE — 36415 COLL VENOUS BLD VENIPUNCTURE: CPT

## 2024-04-25 PROCEDURE — 82105 ALPHA-FETOPROTEIN SERUM: CPT

## 2024-04-26 LAB
HBV DNA SERPL NAA+PROBE-ACNC: 129 IU/ML
HBV DNA SERPL NAA+PROBE-LOG IU: 2.1 {LOG_IU}/ML

## 2024-04-28 LAB — MISCELLANEOUS TEST 1 (ARUP): NORMAL

## 2024-05-01 LAB — HDV IGM SER IA-ACNC: NEGATIVE

## 2024-07-17 ENCOUNTER — OFFICE VISIT (OUTPATIENT)
Dept: FAMILY MEDICINE | Facility: CLINIC | Age: 64
End: 2024-07-17
Payer: COMMERCIAL

## 2024-07-17 VITALS
TEMPERATURE: 97.3 F | BODY MASS INDEX: 26.87 KG/M2 | WEIGHT: 157.4 LBS | HEIGHT: 64 IN | DIASTOLIC BLOOD PRESSURE: 84 MMHG | HEART RATE: 67 BPM | SYSTOLIC BLOOD PRESSURE: 127 MMHG | RESPIRATION RATE: 16 BRPM | OXYGEN SATURATION: 99 %

## 2024-07-17 DIAGNOSIS — Z71.84 TRAVEL ADVICE ENCOUNTER: Primary | ICD-10-CM

## 2024-07-17 PROCEDURE — 90472 IMMUNIZATION ADMIN EACH ADD: CPT | Mod: GA | Performed by: NURSE PRACTITIONER

## 2024-07-17 PROCEDURE — 99402 PREV MED CNSL INDIV APPRX 30: CPT | Mod: 25 | Performed by: NURSE PRACTITIONER

## 2024-07-17 PROCEDURE — 90471 IMMUNIZATION ADMIN: CPT | Mod: GA | Performed by: NURSE PRACTITIONER

## 2024-07-17 PROCEDURE — 90717 YELLOW FEVER VACCINE SUBQ: CPT | Mod: GA | Performed by: NURSE PRACTITIONER

## 2024-07-17 PROCEDURE — 90691 TYPHOID VACCINE IM: CPT | Mod: GA | Performed by: NURSE PRACTITIONER

## 2024-07-17 PROCEDURE — 90713 POLIOVIRUS IPV SC/IM: CPT | Mod: GA | Performed by: NURSE PRACTITIONER

## 2024-07-17 RX ORDER — AZITHROMYCIN 500 MG/1
500 TABLET, FILM COATED ORAL DAILY
Qty: 3 TABLET | Refills: 0 | Status: SHIPPED | OUTPATIENT
Start: 2024-07-17 | End: 2024-07-20

## 2024-07-17 RX ORDER — ATOVAQUONE AND PROGUANIL HYDROCHLORIDE 250; 100 MG/1; MG/1
1 TABLET, FILM COATED ORAL DAILY
Qty: 46 TABLET | Refills: 0 | Status: ON HOLD | OUTPATIENT
Start: 2024-07-31 | End: 2024-10-02

## 2024-07-17 ASSESSMENT — PAIN SCALES - GENERAL: PAINLEVEL: NO PAIN (0)

## 2024-07-17 NOTE — PATIENT INSTRUCTIONS
Thank you for visiting the [facility] : [phone]  Today July 17, 2024 you received the    Yellow Fever (YF)    Polio (IPV) Vaccine    Typhoid - injectable. This vaccine is valid for two years.     Follow up vaccine appointments can be made as a NURSE ONLY visit at the Travel Clinic, (BE PREPARED TO WAIT, ) or at designated [facility].    If you are receiving the Rabies vaccines series, it is important that you follow the exact schedule ordered.     Pre-travel     We recommend that you purchase Medical Evacuation Insurance prior to your departure.  Https://wwwnc.cdc.gov/travel/page/insurance    [place] your travel plans with the  Department of State through STEP ( Smart Traveler Enrollment Program ) https://step.state.gov.  STEP is a free service to allow U[place] citizens and nationals traveling and living abroad to enroll their trip with the nearest U.S. Embassy or Consulate.    Animal Exposure: Avoid all mammals even if they look healthy.  If there is a bite, scratch or even a lick, wash area immediately with soap and water for 15 minutes and seek medical care within 24 hours for evaluation of Rabies post exposure treatment.  Contact your Medical Evacuation Insurance.    Repiratory illness prevention strategies ( Covid and Influenza ) CDC recommendations:  Consider wearing a mask in crowded or poorly ventilated indoor areas, including on public transportation and in transportation hubs.  Hand washing: frequent, thorough handwashing with soap and water for 20 seconds. Use an alcohol-based hand  with at least 60% alcohol if soap and water are not readily available. Avoid touching face, nose, eyes, mouth unless you have done appropriate hand washing as above.  VACCINES: Staying up to date on COVID-19 vaccines significantly lowers the risk of getting very sick, being hospitalized, or dying from COVID-19. CDC recommends that everyone stay up to date on their  COVID-19 vaccines, especially people with weakened immune systems.    Travel Covid 19 Testing:  updated 12/06/2021  International travelers: Pre-travel:  See country specific Embassy websites or airline websites.    Post travel: CDC recommends getting tested 3-5 days after your trip     Post-travel illness:  Contact your provider or [facility] if you develop a fever, rash, cough, diarrhea or other symptoms for up to 1 year after travel.  Inform your healthcare provider when and where you traveled to.    Please call the [facility] with any questions [phone]  Or send your provider a 'My Chart' note.

## 2024-07-17 NOTE — PROGRESS NOTES
"Nurse Note ( Pre-Travel Consult)    Itinerary: [place]    Departure Date: 8/1/2024    Return Date: 9/6/2024    Length of Trip: about 1 month    Reason for Travel: Visiting friends and relatives         Urban or rural: both    Accommodations: Private dwelling    IMMUNIZATION HISTORY  Have you received any immunizations within the past 4 weeks?  No  Have you ever fainted from having your blood drawn or from an injection?  No  Have you ever had a fever reaction to vaccination?  No  Have you ever had any bad reaction or side effect from any vaccination?  No  Have you ever had hepatitis A or B vaccine?  No  Do you live (or work closely) with anyone who has AIDS, an AIDS-like condition, any other immune disorder or who is on chemotherapy for cancer?  No  Do you have a family history of immunodeficiency?  No  Have you received any injection of immune globulin or any blood products during the past 12 months?  No    Patient roomed by BOBBY Dowd is a 64 year old male seen today alone for counsultation for international travel.   Patient will be departing in  2 week(s) and  traveling alone.      Patient itinerary :  will be in the urban region of [place] and in the interior for 1 week which risk for Malaria, Yellow Fever, food borne illnesses, motor vehicle accidents, and Typhoid. exposure.      Patient's activities will include visiting friends and relatives.    Patient's country of birth is [place]   In [place] for 11 years     Special medical concerns: hypertension ,  H/o CVA    Pre-travel questionnaire was completed by patient and reviewed by provider.     Vitals: /84 (BP Location: Right arm, Patient Position: Sitting, Cuff Size: Adult Regular)   Pulse 67   Temp 97.3  F (36.3  C) (Temporal)   Resp 16   Ht 1.632 m (5' 4.25\")   Wt 71.4 kg (157 lb 6.4 oz)   SpO2 99%   BMI 26.81 kg/m    BMI= Body mass index is 26.81 kg/m .    EXAM:  General:  Well-nourished, well-developed in no acute " distress.  Appears to be stated age, interacts appropriately and expresses understanding of information given to patient.    Current Outpatient Medications   Medication Sig Dispense Refill    amLODIPine (NORVASC) 10 MG tablet TAKE ONE TABLET BY MOUTH ONCE DAILY 90 tablet 3    aspirin (ASA) 81 MG EC tablet Take 1 tablet (81 mg) by mouth daily 90 tablet 4    atorvastatin (LIPITOR) 20 MG tablet Take 1 tablet (20 mg) by mouth every evening 90 tablet 3    carvedilol (COREG) 12.5 MG tablet Take 1 tablet (12.5 mg) by mouth 2 times daily (with meals) 180 tablet 4    cloNIDine (CATAPRES) 0.1 MG tablet Take 1 tablet (0.1 mg) by mouth 2 times daily 180 tablet 3    losartan (COZAAR) 100 MG tablet Take 1 tablet (100 mg) by mouth daily 90 tablet 3    Multiple Vitamins-Minerals (ONE-A-DAY MENS 50+ PO) Take 1 tablet by mouth daily      docusate sodium (COLACE) 100 MG capsule Take 1 capsule (100 mg) by mouth 2 times daily as needed for constipation (Patient not taking: Reported on 7/17/2024) 60 capsule 3    psyllium (METAMUCIL/KONSYL) capsule Take 1 capsule by mouth daily (Patient not taking: Reported on 7/17/2024) 90 capsule 3    sildenafil (VIAGRA) 50 MG tablet Take 1 tablet (50 mg) by mouth daily as needed (intercourse) (Patient not taking: Reported on 7/17/2024) 20 tablet 4    tenofovir (VIREAD) 300 MG tablet Take 1 tablet (300 mg) by mouth daily (Patient not taking: Reported on 7/17/2024) 90 tablet 3     Patient Active Problem List   Diagnosis    Hepatitis B, chronic (H)    Thrombocytopenia (H24)    Gastroesophageal reflux disease    Hypertension    History of CVA (cerebrovascular accident)    Hyperaldosteronism (H24)    Chronic insomnia    Chronic kidney disease, stage 2 (mild)     Allergies   Allergen Reactions    Chocolate     Nuts     Orange Fruit [Citrus] Cough         Immunizations discussed include: Works in a group home [employer]   Covid 19: vaccine is not available  Hepatitis A:  immune  Hepatitis B: Chronic    Influenza: vaccine is not available  Typhoid: Ordered/given today, risks, benefits and side effects reviewed  Rabies: Declined  reviewed managment of a animal bite or scratch (washing wound, seek medical care within 24 hours for post exposure prophylaxis )  Yellow Fever: Yellow Fever ordered/given today - side effects, precautions, allergies, risks discussed. Patient expressed understanding.  Divehi Encephalitis: Not indicated  Meningococcus: Not indicated  Tetanus/Diphtheria: Up to date  Measles/Mumps/Rubella: has been vaccinated   Cholera: Not needed  Polio: Ordered/given today, risks, benefits and side effects reviewed  Pneumococcal: Up to date  Varicella: status unoknown  Shingrix: deferred  HPV:  Not indicated     TB: low risk     Altitude Exposure on this trip: no  Past tolerance to Altitude: na    ASSESSMENT/PLAN:  Geronimo was seen today for travel clinic.    Diagnoses and all orders for this visit:    Travel advice encounter  -     azithromycin (ZITHROMAX) 500 MG tablet; Take 1 tablet (500 mg) by mouth daily for 3 doses Take 1 tablet a day for up to 3 days for severe diarrhea  -     atovaquone-proguanil (MALARONE) 250-100 MG tablet; Take 1 tablet by mouth daily Start 2 days before exposure to Malaria and continue daily till  7 days after exposure.    Other orders  -     YELLOW FEVER, LIVE SQ  -     TYPHOID VACCINE, IM  -     POLIOVIRUS 6W-18Y (IPOL)      I have reviewed general recommendations for safe travel   including: food/water precautions, insect precautions, safer sex   practices given high prevalence of Zika, HIV and other STDs,   roadway safety. Educational materials and [employer] report provided.    Malaraia prophylaxis recommended: Malarone  Symptomatic treatment for traveler's diarrhea: azithromycin      Evacuation insurance advised and resources were provided to patient.    Total visit time 30 minutes  with over 50% of time spent counseling patient and shared decision making as detailed  above.    Lillian Castellanos CNP  Certificate in Travel Health

## 2024-07-25 ENCOUNTER — OFFICE VISIT (OUTPATIENT)
Dept: FAMILY MEDICINE | Facility: CLINIC | Age: 64
End: 2024-07-25
Payer: COMMERCIAL

## 2024-07-25 VITALS
RESPIRATION RATE: 18 BRPM | TEMPERATURE: 97.7 F | BODY MASS INDEX: 26.22 KG/M2 | WEIGHT: 153.6 LBS | OXYGEN SATURATION: 98 % | HEIGHT: 64 IN | SYSTOLIC BLOOD PRESSURE: 99 MMHG | HEART RATE: 70 BPM | DIASTOLIC BLOOD PRESSURE: 68 MMHG

## 2024-07-25 DIAGNOSIS — I15.2 HYPERTENSION DUE TO ENDOCRINE DISORDER: ICD-10-CM

## 2024-07-25 DIAGNOSIS — E26.9 HYPERALDOSTERONISM (H): ICD-10-CM

## 2024-07-25 DIAGNOSIS — Z23 NEED FOR SHINGLES VACCINE: ICD-10-CM

## 2024-07-25 DIAGNOSIS — Z29.11 NEED FOR VACCINATION AGAINST RESPIRATORY SYNCYTIAL VIRUS: ICD-10-CM

## 2024-07-25 DIAGNOSIS — Z23 NEED FOR PROPHYLACTIC VACCINATION AGAINST HEPATITIS B VIRUS: ICD-10-CM

## 2024-07-25 DIAGNOSIS — Z23 NEED FOR PROPHYLACTIC VACCINATION AGAINST STREPTOCOCCUS PNEUMONIAE (PNEUMOCOCCUS): ICD-10-CM

## 2024-07-25 DIAGNOSIS — N18.2 CHRONIC KIDNEY DISEASE, STAGE 2 (MILD): Primary | ICD-10-CM

## 2024-07-25 DIAGNOSIS — Z23 NEED FOR PROPHYLACTIC VACCINATION AGAINST HEPATITIS A: ICD-10-CM

## 2024-07-25 DIAGNOSIS — I63.9 CEREBROVASCULAR ACCIDENT (CVA), UNSPECIFIED MECHANISM (H): ICD-10-CM

## 2024-07-25 PROCEDURE — G0010 ADMIN HEPATITIS B VACCINE: HCPCS | Performed by: INTERNAL MEDICINE

## 2024-07-25 PROCEDURE — 99214 OFFICE O/P EST MOD 30 MIN: CPT | Mod: 25 | Performed by: INTERNAL MEDICINE

## 2024-07-25 PROCEDURE — G0009 ADMIN PNEUMOCOCCAL VACCINE: HCPCS | Performed by: INTERNAL MEDICINE

## 2024-07-25 PROCEDURE — 90746 HEPB VACCINE 3 DOSE ADULT IM: CPT | Performed by: INTERNAL MEDICINE

## 2024-07-25 PROCEDURE — 90677 PCV20 VACCINE IM: CPT | Performed by: INTERNAL MEDICINE

## 2024-07-25 RX ORDER — LOSARTAN POTASSIUM 100 MG/1
100 TABLET ORAL DAILY
Qty: 90 TABLET | Refills: 3 | Status: SHIPPED | OUTPATIENT
Start: 2024-07-25

## 2024-07-25 RX ORDER — ATORVASTATIN CALCIUM 20 MG/1
20 TABLET, FILM COATED ORAL EVERY EVENING
Qty: 90 TABLET | Refills: 3 | Status: SHIPPED | OUTPATIENT
Start: 2024-07-25

## 2024-07-25 RX ORDER — CARVEDILOL 12.5 MG/1
12.5 TABLET ORAL 2 TIMES DAILY WITH MEALS
Qty: 180 TABLET | Refills: 4 | Status: ON HOLD | OUTPATIENT
Start: 2024-07-25 | End: 2024-10-03

## 2024-07-25 RX ORDER — CLONIDINE HYDROCHLORIDE 0.1 MG/1
0.1 TABLET ORAL 2 TIMES DAILY
Qty: 180 TABLET | Refills: 3 | Status: SHIPPED | OUTPATIENT
Start: 2024-07-25

## 2024-07-25 RX ORDER — AMLODIPINE BESYLATE 10 MG/1
10 TABLET ORAL DAILY
Qty: 90 TABLET | Refills: 3 | Status: SHIPPED | OUTPATIENT
Start: 2024-07-25

## 2024-07-25 NOTE — PROGRESS NOTES
"  Assessment & Plan   Problem List Items Addressed This Visit       Hypertension (Chronic)    Relevant Medications    amLODIPine (NORVASC) 10 MG tablet    carvedilol (COREG) 12.5 MG tablet    cloNIDine (CATAPRES) 0.1 MG tablet    losartan (COZAAR) 100 MG tablet    Chronic kidney disease, stage 2 (mild) - Primary    Relevant Orders    REVIEW OF HEALTH MAINTENANCE PROTOCOL ORDERS (Completed)    Hyperaldosteronism (H24)     Other Visit Diagnoses       Need for shingles vaccine        Need for prophylactic vaccination against hepatitis A        Need for prophylactic vaccination against hepatitis B virus        Need for vaccination against respiratory syncytial virus        Need for prophylactic vaccination against Streptococcus pneumoniae (pneumococcus)        Relevant Orders    Pneumococcal 20 Valent Conjugate (PCV20) (Completed)    Cerebrovascular accident (CVA), unspecified mechanism (H)        Relevant Medications    atorvastatin (LIPITOR) 20 MG tablet                  BMI  Estimated body mass index is 26.16 kg/m  as calculated from the following:    Height as of this encounter: 1.632 m (5' 4.25\").    Weight as of this encounter: 69.7 kg (153 lb 9.6 oz).   Weight management plan: Discussed healthy diet and exercise guidelines    Work on weight loss  Regular exercise      Kari Melton is a 64 year old, presenting for the following health issues:  Recheck Medication        7/25/2024     8:45 AM   Additional Questions   Roomed by Christy     History of Present Illness       Hypertension: He presents for follow up of hypertension.  He does check blood pressure  regularly outside of the clinic. Outpatient blood pressures have not been over 140/90. He does not follow a low salt diet.     He eats 0-1 servings of fruits and vegetables daily.He consumes 4 sweetened beverage(s) daily.He exercises with enough effort to increase his heart rate 9 or less minutes per day.  He exercises with enough effort to increase his " "heart rate 3 or less days per week.   He is taking medications regularly.   [facility]  1 month   August 1 st                 Review of Systems  Constitutional, HEENT, cardiovascular, pulmonary, gi and gu systems are negative, except as otherwise noted.      Objective    BP 99/68 (BP Location: Left arm, Patient Position: Sitting, Cuff Size: Adult Regular)   Pulse 70   Temp 97.7  F (36.5  C) (Temporal)   Resp 18   Ht 1.632 m (5' 4.25\")   Wt 69.7 kg (153 lb 9.6 oz)   SpO2 98%   BMI 26.16 kg/m    Body mass index is 26.16 kg/m .  Physical Exam   GENERAL: alert and no distress  EYES: Eyes grossly normal to inspection, PERRL and conjunctivae and sclerae normal  HENT: ear canals and TM's normal, nose and mouth without ulcers or lesions  NECK: no adenopathy, no asymmetry, masses, or scars  RESP: lungs clear to auscultation - no rales, rhonchi or wheezes  CV: regular rate and rhythm, normal S1 S2, no S3 or S4, no murmur, click or rub, no peripheral edema  ABDOMEN: soft, nontender, no hepatosplenomegaly, no masses and bowel sounds normal  MS: no gross musculoskeletal defects noted, no edema  SKIN: no suspicious lesions or rashes  NEURO: Normal strength and tone, mentation intact and speech normal  BACK: no CVA tenderness, no paralumbar tenderness  PSYCH: mentation appears normal, affect normal/bright  LYMPH: no cervical, supraclavicular, axillary, or inguinal adenopathy    No results found for any visits on 07/25/24.        Signed Electronically by: Michele Saldana MD    "

## 2024-10-01 ENCOUNTER — APPOINTMENT (OUTPATIENT)
Dept: CT IMAGING | Facility: CLINIC | Age: 64
DRG: 641 | End: 2024-10-01
Attending: EMERGENCY MEDICINE
Payer: MEDICARE

## 2024-10-01 ENCOUNTER — HOSPITAL ENCOUNTER (INPATIENT)
Facility: CLINIC | Age: 64
LOS: 2 days | Discharge: HOME | DRG: 641 | End: 2024-10-03
Attending: EMERGENCY MEDICINE | Admitting: INTERNAL MEDICINE
Payer: MEDICARE

## 2024-10-01 DIAGNOSIS — B18.1 HEPATITIS B, CHRONIC (H): ICD-10-CM

## 2024-10-01 DIAGNOSIS — E87.6 HYPOKALEMIA: ICD-10-CM

## 2024-10-01 DIAGNOSIS — M54.2 CERVICALGIA: ICD-10-CM

## 2024-10-01 DIAGNOSIS — B18.1 CHRONIC VIRAL HEPATITIS B WITHOUT DELTA AGENT AND WITHOUT COMA (H): ICD-10-CM

## 2024-10-01 DIAGNOSIS — K74.60 CIRRHOSIS OF LIVER WITHOUT ASCITES, UNSPECIFIED HEPATIC CIRRHOSIS TYPE (H): ICD-10-CM

## 2024-10-01 DIAGNOSIS — E26.9 HYPERALDOSTERONISM (H): ICD-10-CM

## 2024-10-01 DIAGNOSIS — R00.1 SINUS BRADYCARDIA: ICD-10-CM

## 2024-10-01 DIAGNOSIS — R55 SYNCOPE AND COLLAPSE: ICD-10-CM

## 2024-10-01 DIAGNOSIS — I10 PRIMARY HYPERTENSION: Chronic | ICD-10-CM

## 2024-10-01 DIAGNOSIS — R42 DIZZINESS AND GIDDINESS: ICD-10-CM

## 2024-10-01 DIAGNOSIS — I44.0 FIRST DEGREE ATRIOVENTRICULAR BLOCK: ICD-10-CM

## 2024-10-01 DIAGNOSIS — R53.81 PHYSICAL DECONDITIONING: Primary | ICD-10-CM

## 2024-10-01 LAB
ALBUMIN SERPL BCG-MCNC: 3.9 G/DL (ref 3.5–5.2)
ALP SERPL-CCNC: 82 U/L (ref 40–150)
ALT SERPL W P-5'-P-CCNC: 27 U/L (ref 0–70)
ANION GAP SERPL CALCULATED.3IONS-SCNC: 12 MMOL/L (ref 7–15)
AST SERPL W P-5'-P-CCNC: 37 U/L (ref 0–45)
ATRIAL RATE - MUSE: 59 BPM
BASOPHILS # BLD AUTO: 0 10E3/UL (ref 0–0.2)
BASOPHILS NFR BLD AUTO: 0 %
BILIRUB SERPL-MCNC: 0.8 MG/DL
BUN SERPL-MCNC: 18.8 MG/DL (ref 8–23)
CALCIUM SERPL-MCNC: 9.2 MG/DL (ref 8.8–10.4)
CHLORIDE SERPL-SCNC: 106 MMOL/L (ref 98–107)
CREAT SERPL-MCNC: 1.41 MG/DL (ref 0.67–1.17)
DIASTOLIC BLOOD PRESSURE - MUSE: NORMAL MMHG
EGFRCR SERPLBLD CKD-EPI 2021: 55 ML/MIN/1.73M2
EOSINOPHIL # BLD AUTO: 0.1 10E3/UL (ref 0–0.7)
EOSINOPHIL NFR BLD AUTO: 1 %
ERYTHROCYTE [DISTWIDTH] IN BLOOD BY AUTOMATED COUNT: 13.1 % (ref 10–15)
GLUCOSE BLDC GLUCOMTR-MCNC: 155 MG/DL (ref 70–99)
GLUCOSE SERPL-MCNC: 136 MG/DL (ref 70–99)
HCO3 SERPL-SCNC: 23 MMOL/L (ref 22–29)
HCT VFR BLD AUTO: 44.7 % (ref 40–53)
HGB BLD-MCNC: 15.1 G/DL (ref 13.3–17.7)
IMM GRANULOCYTES # BLD: 0 10E3/UL
IMM GRANULOCYTES NFR BLD: 1 %
INR PPP: 1.24 (ref 0.85–1.15)
INTERPRETATION ECG - MUSE: NORMAL
LYMPHOCYTES # BLD AUTO: 1 10E3/UL (ref 0.8–5.3)
LYMPHOCYTES NFR BLD AUTO: 24 %
MAGNESIUM SERPL-MCNC: 2.1 MG/DL (ref 1.7–2.3)
MCH RBC QN AUTO: 29 PG (ref 26.5–33)
MCHC RBC AUTO-ENTMCNC: 33.8 G/DL (ref 31.5–36.5)
MCV RBC AUTO: 86 FL (ref 78–100)
MONOCYTES # BLD AUTO: 0.3 10E3/UL (ref 0–1.3)
MONOCYTES NFR BLD AUTO: 7 %
NEUTROPHILS # BLD AUTO: 2.8 10E3/UL (ref 1.6–8.3)
NEUTROPHILS NFR BLD AUTO: 67 %
NRBC # BLD AUTO: 0 10E3/UL
NRBC BLD AUTO-RTO: 0 /100
P AXIS - MUSE: 68 DEGREES
PLATELET # BLD AUTO: 96 10E3/UL (ref 150–450)
POTASSIUM SERPL-SCNC: 3 MMOL/L (ref 3.4–5.3)
PR INTERVAL - MUSE: 230 MS
PROT SERPL-MCNC: 7 G/DL (ref 6.4–8.3)
QRS DURATION - MUSE: 72 MS
QT - MUSE: 428 MS
QTC - MUSE: 423 MS
R AXIS - MUSE: 38 DEGREES
RBC # BLD AUTO: 5.21 10E6/UL (ref 4.4–5.9)
SODIUM SERPL-SCNC: 141 MMOL/L (ref 135–145)
SYSTOLIC BLOOD PRESSURE - MUSE: NORMAL MMHG
T AXIS - MUSE: 265 DEGREES
TROPONIN T SERPL HS-MCNC: 13 NG/L
VENTRICULAR RATE- MUSE: 59 BPM
WBC # BLD AUTO: 4.2 10E3/UL (ref 4–11)

## 2024-10-01 PROCEDURE — 85610 PROTHROMBIN TIME: CPT | Performed by: EMERGENCY MEDICINE

## 2024-10-01 PROCEDURE — 85025 COMPLETE CBC W/AUTO DIFF WBC: CPT | Performed by: EMERGENCY MEDICINE

## 2024-10-01 PROCEDURE — 99285 EMERGENCY DEPT VISIT HI MDM: CPT | Mod: 25 | Performed by: EMERGENCY MEDICINE

## 2024-10-01 PROCEDURE — 36415 COLL VENOUS BLD VENIPUNCTURE: CPT | Performed by: EMERGENCY MEDICINE

## 2024-10-01 PROCEDURE — G1010 CDSM STANSON: HCPCS

## 2024-10-01 PROCEDURE — 250N000013 HC RX MED GY IP 250 OP 250 PS 637

## 2024-10-01 PROCEDURE — 96374 THER/PROPH/DIAG INJ IV PUSH: CPT | Performed by: EMERGENCY MEDICINE

## 2024-10-01 PROCEDURE — 82962 GLUCOSE BLOOD TEST: CPT

## 2024-10-01 PROCEDURE — 93005 ELECTROCARDIOGRAM TRACING: CPT | Performed by: EMERGENCY MEDICINE

## 2024-10-01 PROCEDURE — 250N000011 HC RX IP 250 OP 636: Performed by: EMERGENCY MEDICINE

## 2024-10-01 PROCEDURE — 84484 ASSAY OF TROPONIN QUANT: CPT | Performed by: EMERGENCY MEDICINE

## 2024-10-01 PROCEDURE — 82040 ASSAY OF SERUM ALBUMIN: CPT | Performed by: EMERGENCY MEDICINE

## 2024-10-01 PROCEDURE — 99285 EMERGENCY DEPT VISIT HI MDM: CPT | Performed by: EMERGENCY MEDICINE

## 2024-10-01 PROCEDURE — 72125 CT NECK SPINE W/O DYE: CPT | Mod: 26 | Performed by: RADIOLOGY

## 2024-10-01 PROCEDURE — 93010 ELECTROCARDIOGRAM REPORT: CPT | Performed by: EMERGENCY MEDICINE

## 2024-10-01 PROCEDURE — 258N000003 HC RX IP 258 OP 636: Performed by: EMERGENCY MEDICINE

## 2024-10-01 PROCEDURE — 99222 1ST HOSP IP/OBS MODERATE 55: CPT | Mod: GC | Performed by: INTERNAL MEDICINE

## 2024-10-01 PROCEDURE — 83735 ASSAY OF MAGNESIUM: CPT | Performed by: EMERGENCY MEDICINE

## 2024-10-01 PROCEDURE — 120N000002 HC R&B MED SURG/OB UMMC

## 2024-10-01 PROCEDURE — 70450 CT HEAD/BRAIN W/O DYE: CPT | Mod: 26 | Performed by: RADIOLOGY

## 2024-10-01 RX ORDER — LOSARTAN POTASSIUM 50 MG/1
100 TABLET ORAL DAILY
Status: DISCONTINUED | OUTPATIENT
Start: 2024-10-02 | End: 2024-10-03 | Stop reason: HOSPADM

## 2024-10-01 RX ORDER — ACETAMINOPHEN 650 MG/1
650 SUPPOSITORY RECTAL EVERY 4 HOURS PRN
Status: DISCONTINUED | OUTPATIENT
Start: 2024-10-01 | End: 2024-10-03 | Stop reason: HOSPADM

## 2024-10-01 RX ORDER — MULTIPLE VITAMINS W/ MINERALS TAB 9MG-400MCG
1 TAB ORAL DAILY
Status: DISCONTINUED | OUTPATIENT
Start: 2024-10-02 | End: 2024-10-03 | Stop reason: HOSPADM

## 2024-10-01 RX ORDER — AMOXICILLIN 250 MG
2 CAPSULE ORAL 2 TIMES DAILY PRN
Status: DISCONTINUED | OUTPATIENT
Start: 2024-10-01 | End: 2024-10-03 | Stop reason: HOSPADM

## 2024-10-01 RX ORDER — CALCIUM CARBONATE 500 MG/1
1000 TABLET, CHEWABLE ORAL 4 TIMES DAILY PRN
Status: DISCONTINUED | OUTPATIENT
Start: 2024-10-01 | End: 2024-10-03 | Stop reason: HOSPADM

## 2024-10-01 RX ORDER — POLYETHYLENE GLYCOL 3350 17 G/17G
17 POWDER, FOR SOLUTION ORAL 2 TIMES DAILY PRN
Status: DISCONTINUED | OUTPATIENT
Start: 2024-10-01 | End: 2024-10-03 | Stop reason: HOSPADM

## 2024-10-01 RX ORDER — ACETAMINOPHEN 325 MG/1
650 TABLET ORAL EVERY 4 HOURS PRN
Status: DISCONTINUED | OUTPATIENT
Start: 2024-10-01 | End: 2024-10-03 | Stop reason: HOSPADM

## 2024-10-01 RX ORDER — ASPIRIN 81 MG/1
81 TABLET ORAL DAILY
Status: DISCONTINUED | OUTPATIENT
Start: 2024-10-02 | End: 2024-10-03 | Stop reason: HOSPADM

## 2024-10-01 RX ORDER — LIDOCAINE 40 MG/G
CREAM TOPICAL
Status: DISCONTINUED | OUTPATIENT
Start: 2024-10-01 | End: 2024-10-03 | Stop reason: HOSPADM

## 2024-10-01 RX ORDER — ATORVASTATIN CALCIUM 20 MG/1
20 TABLET, FILM COATED ORAL EVERY EVENING
Status: DISCONTINUED | OUTPATIENT
Start: 2024-10-01 | End: 2024-10-03 | Stop reason: HOSPADM

## 2024-10-01 RX ORDER — DOCUSATE SODIUM 100 MG/1
100 CAPSULE, LIQUID FILLED ORAL 2 TIMES DAILY PRN
Status: DISCONTINUED | OUTPATIENT
Start: 2024-10-01 | End: 2024-10-03 | Stop reason: HOSPADM

## 2024-10-01 RX ORDER — ATOVAQUONE AND PROGUANIL HYDROCHLORIDE 250; 100 MG/1; MG/1
1 TABLET, FILM COATED ORAL DAILY
Status: DISCONTINUED | OUTPATIENT
Start: 2024-10-02 | End: 2024-10-01

## 2024-10-01 RX ORDER — CARVEDILOL 12.5 MG/1
12.5 TABLET ORAL 2 TIMES DAILY WITH MEALS
Status: DISCONTINUED | OUTPATIENT
Start: 2024-10-02 | End: 2024-10-02

## 2024-10-01 RX ORDER — ENOXAPARIN SODIUM 100 MG/ML
40 INJECTION SUBCUTANEOUS EVERY 24 HOURS
Status: DISCONTINUED | OUTPATIENT
Start: 2024-10-02 | End: 2024-10-03 | Stop reason: HOSPADM

## 2024-10-01 RX ORDER — TENOFOVIR DISOPROXIL FUMARATE 300 MG/1
300 TABLET, FILM COATED ORAL DAILY
Status: DISCONTINUED | OUTPATIENT
Start: 2024-10-02 | End: 2024-10-03 | Stop reason: HOSPADM

## 2024-10-01 RX ORDER — AMLODIPINE BESYLATE 10 MG/1
10 TABLET ORAL DAILY
Status: DISCONTINUED | OUTPATIENT
Start: 2024-10-02 | End: 2024-10-03 | Stop reason: HOSPADM

## 2024-10-01 RX ORDER — CLONIDINE HYDROCHLORIDE 0.1 MG/1
0.1 TABLET ORAL 2 TIMES DAILY
Status: DISCONTINUED | OUTPATIENT
Start: 2024-10-01 | End: 2024-10-03 | Stop reason: HOSPADM

## 2024-10-01 RX ORDER — AMOXICILLIN 250 MG
1 CAPSULE ORAL 2 TIMES DAILY PRN
Status: DISCONTINUED | OUTPATIENT
Start: 2024-10-01 | End: 2024-10-03 | Stop reason: HOSPADM

## 2024-10-01 RX ORDER — POTASSIUM CHLORIDE 7.45 MG/ML
10 INJECTION INTRAVENOUS ONCE
Status: COMPLETED | OUTPATIENT
Start: 2024-10-01 | End: 2024-10-01

## 2024-10-01 RX ORDER — SILDENAFIL 50 MG/1
50 TABLET, FILM COATED ORAL DAILY PRN
Status: DISCONTINUED | OUTPATIENT
Start: 2024-10-01 | End: 2024-10-01

## 2024-10-01 RX ADMIN — CALCIUM CARBONATE (ANTACID) CHEW TAB 500 MG 1000 MG: 500 CHEW TAB at 21:56

## 2024-10-01 RX ADMIN — SODIUM CHLORIDE 500 ML: 9 INJECTION, SOLUTION INTRAVENOUS at 19:16

## 2024-10-01 RX ADMIN — POTASSIUM CHLORIDE 10 MEQ: 7.46 INJECTION, SOLUTION INTRAVENOUS at 19:16

## 2024-10-01 RX ADMIN — ATORVASTATIN CALCIUM 20 MG: 20 TABLET, FILM COATED ORAL at 23:53

## 2024-10-01 ASSESSMENT — ACTIVITIES OF DAILY LIVING (ADL)
ADLS_ACUITY_SCORE: 35

## 2024-10-01 NOTE — ED PROVIDER NOTES
[place] EMERGENCY DEPARTMENT ([facility])    10/01/24       ED PROVIDER NOTE    History     Chief Complaint   Patient presents with    Fall     HPI    Geronimo Coats is a 64 year old male with a past medical history of syncope and collapse, chronic insomnia, CKD stage 2, hepatitis B, CVA, hypertension, and thrombocytopenia, who presents to the ED for evaluation of syncope.  Patient reports that he was sitting at the table eating, he stood up to get a glass of water and suddenly became very flushed and dizzy.  He denies any chest pain or shortness of breath but did report some palpitations.  No nausea or vomiting.  He subsequently had a syncopal episode and hit his head on the stove.  He does not remember falling.  His wife heard him fall, came and found him on the floor and called 911.  Currently patient complains of headache.  Denies being on any anticoagulation, per chart review he is on aspirin 81 mg.  Also complaining of neck pain, c-collar was placed prior to arrival.  He currently denies any chest pain, shortness of breath, or abdominal pain.  No back pain.  He reports some groin pain which he attributes to chronic hernias.  No unilateral weakness or numbness.    Patient had syncopal episode on 03/11/24. Patient was admitted to Medical Surgery at [facility].     He had a Holter monitor done as an outpatient as result of that hospitalization, record review shows that he had an underlying rhythm of first-degree block, but multiple episodes of SVT.    Per [facility], most recent tetanus was in 2016.    ---------------------------------------------------------------------------------------------------  Per Chart Review:  Impression CT HEAD W/O CONTRAST 03/11/2024:  1.  Moderate burden of chronic small vessel ischemic changes including multiple chronic appearing lacunar infarcts within the white matter and left thalamus.  2.  No definite acute infarct, but MRI would be considerably more  sensitive for small infarct in this patient.  3.  No hemorrhage.  --------------------------------------------------------------------------------------------------  Past Medical History  Past Medical History:   Diagnosis Date    Benign essential hypertension     Hyperlipidemia     Right pontine stroke (H) 10/01/2022     Past Surgical History:   Procedure Laterality Date    HERNIA REPAIR  1974    LAPAROSCOPIC CHOLECYSTECTOMY  2014     amLODIPine (NORVASC) 10 MG tablet  aspirin (ASA) 81 MG EC tablet  atorvastatin (LIPITOR) 20 MG tablet  atovaquone-proguanil (MALARONE) 250-100 MG tablet  carvedilol (COREG) 12.5 MG tablet  cloNIDine (CATAPRES) 0.1 MG tablet  docusate sodium (COLACE) 100 MG capsule  losartan (COZAAR) 100 MG tablet  Multiple Vitamins-Minerals (ONE-A-DAY MENS 50+ PO)  psyllium (METAMUCIL/KONSYL) capsule  sildenafil (VIAGRA) 50 MG tablet  tenofovir (VIREAD) 300 MG tablet      Allergies   Allergen Reactions    Chocolate     Nuts     Orange Fruit [Cuero] Cough     Family History  Family History   Problem Relation Age of Onset    Hypertension Sister     Liver Disease No family hx of     Cirrhosis No family hx of      Social History   Social History     Tobacco Use    Smoking status: Never     Passive exposure: Never    Smokeless tobacco: Never   Vaping Use    Vaping status: Never Used   Substance Use Topics    Alcohol use: Yes     Comment: rarely 2-3 times per year    Drug use: Never      A complete review of systems was performed with pertinent positives and negatives noted in the HPI, and all other systems negative.    Physical Exam   BP: 124/77  Pulse: 57  Temp: 97.5  F (36.4  C)  Resp: 18  SpO2: 98 %  Physical Exam  Vitals and nursing note reviewed.   Constitutional:       General: He is not in acute distress.     Appearance: He is not diaphoretic.      Comments: Adult male, alert, cooperative, no acute distress   HENT:      Mouth/Throat:      Mouth: Mucous membranes are moist.      Pharynx: Oropharynx  is clear. No oropharyngeal exudate.   Eyes:      General: No scleral icterus.     Pupils: Pupils are equal, round, and reactive to light.   Neck:      Comments: C-collar in place, some midline tenderness to palpation noted  Cardiovascular:      Rate and Rhythm: Normal rate.      Pulses: Normal pulses.      Heart sounds: No murmur heard.  Pulmonary:      Effort: No respiratory distress.      Breath sounds: Normal breath sounds.   Abdominal:      General: Bowel sounds are normal.      Palpations: Abdomen is soft.      Tenderness: There is no abdominal tenderness. There is no guarding.   Musculoskeletal:         General: No tenderness.   Skin:     General: Skin is warm.      Findings: No rash.   Neurological:      General: No focal deficit present.      Mental Status: He is alert.      GCS: GCS eye subscore is 4. GCS verbal subscore is 5. GCS motor subscore is 6.      Cranial Nerves: Cranial nerves 2-12 are intact.      Sensory: Sensation is intact.      Motor: Motor function is intact.      Coordination: Coordination is intact.   Psychiatric:         Mood and Affect: Mood normal.         ED Course, Procedures, & Data      Procedures            EKG Interpretation:      Interpreted by Ermelinda Mcfadden MD  Time reviewed:1827   Symptoms at time of EKG: None   Rhythm: Sinus bradycardia and 1 degree AV block  Rate: 50-60  Axis: Normal  Ectopy: None  Conduction: 1st degree AV block  ST Segments/ T Waves: somewhat flattened T waves diffusely, no ST segment elevation/depression  Q Waves: None  Comparison to prior: Unchanged    Clinical Impression: no acute changes            Results for orders placed or performed during the hospital encounter of 10/01/24   Head CT w/o contrast     Status: None    Narrative    EXAM: CT HEAD W/O CONTRAST  10/1/2024 6:27 PM     HISTORY: syncope, fall, eval for bleed       COMPARISON: MRI brain 10/1/2022    TECHNIQUE: Using multidetector thin collimation helical acquisition  technique, axial,  coronal and sagittal CT images from the skull base  to the vertex were obtained without intravenous contrast.   (topogram) image(s) also obtained and reviewed. Dose reduction  techniques were used.    FINDINGS:  No acute intracranial hemorrhage, mass effect, or midline shift. No  acute loss of gray-white matter differentiation in the cerebral  hemispheres. Ventricles are proportionate to the cerebral sulci. Clear  basal cisterns. Moderate scattered periventricular hypoattenuating  foci, as can be seen in chronic small vessel ischemic disease.  Multiple small chronic infarcts within bilateral centrum semiovale  bilaterally, left coronal radiata, bilateral thalami.     The bony calvaria and the bones of the skull base are normal. Mucosal  thickening at the base of the bilateral maxillary sinuses otherwise.  Mucous retention cysts in the maxillary sinus. Mastoid air cells are  clear. Grossly normal orbits.      Impression    IMPRESSION:   1. No acute intracranial pathology.  2. Multiple small chronic infarcts within bilateral centrum semiovale  bilaterally, left coronal radiata, and bilateral thalami.   .    I have personally reviewed the examination and initial interpretation  and I agree with the findings.    ANITA HERRON MD         SYSTEM ID:  [id number]   CT Cervical Spine w/o Contrast     Status: None (Preliminary result)    Impression    RESIDENT PRELIMINARY INTERPRETATION  IMPRESSION:  1. No acute fracture or traumatic subluxation.  2. Degenerative changes throughout the cervical spine. No severe  neural foraminal or spinal canal narrowing.   Comprehensive metabolic panel     Status: Abnormal   Result Value Ref Range    Sodium 141 135 - 145 mmol/L    Potassium 3.0 (L) 3.4 - 5.3 mmol/L    Carbon Dioxide (CO2) 23 22 - 29 mmol/L    Anion Gap 12 7 - 15 mmol/L    Urea Nitrogen 18.8 8.0 - 23.0 mg/dL    Creatinine 1.41 (H) 0.67 - 1.17 mg/dL    GFR Estimate 55 (L) >60 mL/min/1.73m2    Calcium 9.2 8.8 - 10.4 mg/dL     Chloride 106 98 - 107 mmol/L    Glucose 136 (H) 70 - 99 mg/dL    Alkaline Phosphatase 82 40 - 150 U/L    AST 37 0 - 45 U/L    ALT 27 0 - 70 U/L    Protein Total 7.0 6.4 - 8.3 g/dL    Albumin 3.9 3.5 - 5.2 g/dL    Bilirubin Total 0.8 <=1.2 mg/dL   INR     Status: Abnormal   Result Value Ref Range    INR 1.24 (H) 0.85 - 1.15   Troponin T, High Sensitivity     Status: Normal   Result Value Ref Range    Troponin T, High Sensitivity 13 <=22 ng/L   Magnesium     Status: Normal   Result Value Ref Range    Magnesium 2.1 1.7 - 2.3 mg/dL   CBC with platelets and differential     Status: Abnormal   Result Value Ref Range    WBC Count 4.2 4.0 - 11.0 10e3/uL    RBC Count 5.21 4.40 - 5.90 10e6/uL    Hemoglobin 15.1 13.3 - 17.7 g/dL    Hematocrit 44.7 40.0 - 53.0 %    MCV 86 78 - 100 fL    MCH 29.0 26.5 - 33.0 pg    MCHC 33.8 31.5 - 36.5 g/dL    RDW 13.1 10.0 - 15.0 %    Platelet Count 96 (L) 150 - 450 10e3/uL    % Neutrophils 67 %    % Lymphocytes 24 %    % Monocytes 7 %    % Eosinophils 1 %    % Basophils 0 %    % Immature Granulocytes 1 %    NRBCs per 100 WBC 0 <1 /100    Absolute Neutrophils 2.8 1.6 - 8.3 10e3/uL    Absolute Lymphocytes 1.0 0.8 - 5.3 10e3/uL    Absolute Monocytes 0.3 0.0 - 1.3 10e3/uL    Absolute Eosinophils 0.1 0.0 - 0.7 10e3/uL    Absolute Basophils 0.0 0.0 - 0.2 10e3/uL    Absolute Immature Granulocytes 0.0 <=0.4 10e3/uL    Absolute NRBCs 0.0 10e3/uL   Glucose by meter     Status: Abnormal   Result Value Ref Range    GLUCOSE BY METER POCT 155 (H) 70 - 99 mg/dL   EKG 12 lead     Status: None   Result Value Ref Range    Systolic Blood Pressure  mmHg    Diastolic Blood Pressure  mmHg    Ventricular Rate 59 BPM    Atrial Rate 59 BPM    MO Interval 230 ms    QRS Duration 72 ms     ms    QTc 423 ms    P Axis 68 degrees    R AXIS 38 degrees    T Axis 265 degrees    Interpretation ECG       Sinus bradycardia with 1st degree A-V block with Premature atrial complexes  Nonspecific T wave abnormality  Abnormal  ECG  Unconfirmed report - interpretation of this ECG is computer generated - see medical record for final interpretation  Confirmed by - EMERGENCY ROOM, PHYSICIAN ([id number]),  CHRISTIANO HELM ([id number]) on 10/1/2024 6:25:10 PM     CBC with Platelets & Differential     Status: Abnormal    Narrative    The following orders were created for panel order CBC with Platelets & Differential.  Procedure                               Abnormality         Status                     ---------                               -----------         ------                     CBC with platelets and d...[[id number]]  Abnormal            Final result                 Please view results for these tests on the individual orders.     Medications   sodium chloride 0.9% BOLUS 500 mL (has no administration in time range)   potassium chloride 10 mEq in 100 mL sterile water infusion (has no administration in time range)     Labs Ordered and Resulted from Time of ED Arrival to Time of ED Departure   COMPREHENSIVE METABOLIC PANEL - Abnormal       Result Value    Sodium 141      Potassium 3.0 (*)     Carbon Dioxide (CO2) 23      Anion Gap 12      Urea Nitrogen 18.8      Creatinine 1.41 (*)     GFR Estimate 55 (*)     Calcium 9.2      Chloride 106      Glucose 136 (*)     Alkaline Phosphatase 82      AST 37      ALT 27      Protein Total 7.0      Albumin 3.9      Bilirubin Total 0.8     INR - Abnormal    INR 1.24 (*)    CBC WITH PLATELETS AND DIFFERENTIAL - Abnormal    WBC Count 4.2      RBC Count 5.21      Hemoglobin 15.1      Hematocrit 44.7      MCV 86      MCH 29.0      MCHC 33.8      RDW 13.1      Platelet Count 96 (*)     % Neutrophils 67      % Lymphocytes 24      % Monocytes 7      % Eosinophils 1      % Basophils 0      % Immature Granulocytes 1      NRBCs per 100 WBC 0      Absolute Neutrophils 2.8      Absolute Lymphocytes 1.0      Absolute Monocytes 0.3      Absolute Eosinophils 0.1      Absolute Basophils 0.0      Absolute Immature  Granulocytes 0.0      Absolute NRBCs 0.0     GLUCOSE BY METER - Abnormal    GLUCOSE BY METER POCT 155 (*)    TROPONIN T, HIGH SENSITIVITY - Normal    Troponin T, High Sensitivity 13     MAGNESIUM - Normal    Magnesium 2.1     GLUCOSE MONITOR NURSING POCT   TROPONIN T, HIGH SENSITIVITY     CT Cervical Spine w/o Contrast   Preliminary Result   RESIDENT PRELIMINARY INTERPRETATION   IMPRESSION:   1. No acute fracture or traumatic subluxation.   2. Degenerative changes throughout the cervical spine. No severe   neural foraminal or spinal canal narrowing.      Head CT w/o contrast   Final Result   IMPRESSION:    1. No acute intracranial pathology.   2. Multiple small chronic infarcts within bilateral centrum semiovale   bilaterally, left coronal radiata, and bilateral thalami.    .      I have personally reviewed the examination and initial interpretation   and I agree with the findings.      ANITA HERRON MD            SYSTEM ID:  [id number]             Critical care was not performed.     Medical Decision Making  The patient's presentation was of high complexity (an acute health issue posing potential threat to life or bodily function).    The patient's evaluation involved:  review of external note(s) from 1 sources (see separate area of note for details)  review of 3+ test result(s) ordered prior to this encounter (see separate area of note for details)  ordering and/or review of 3+ test(s) in this encounter (see separate area of note for details)  discussion of management or test interpretation with another health professional (see separate area of note for details)    The patient's management necessitated high risk (a decision regarding hospitalization).    Assessment & Plan    This is a 64-year-old male who presents to the ED via EMS after a syncopal event at home.  Patient noted prodromal symptoms including palpitations and feeling warm, subsequently had a syncopal episode.    Differential diagnosis certainly needs  to include cardiac etiology.  Patient had a hospitalization for syncope at [facility] in March 2024, he did have a Holter monitor as a result of that hospitalization which did show multiple episodes of SVT.  Other etiologies of syncope could include ACS, also need to consider stroke, hypovolemia, hypoglycemia, amongst others.  EKG was done upon arrival and this demonstrates sinus bradycardia with first-degree AV block, similar to previous.  We did establish IV access in the digital blood for laboratory analysis.  Point-of-care glucose is 155.  CBC normal except for thrombocytopenia with platelets of 96, consistent with previous, CMP demonstrates elevated creatinine at 1.41, consistent with previous.  Potassium is low at 3.0, electrolytes otherwise within normal limits and LFTs are within normal limits, INR 1.24, troponin is 13, magnesium 2.1.  Head CT was read by radiology as no evidence of acute intracranial hemorrhage, hydrocephalus or transcortical infarct, no fracture.  There are presumed sequela of moderate chronic microvascular ischemic disease.  C-spine CT shows no acute fracture or traumatic subluxation, degenerative changes noted throughout cervical spine without severe neuroforaminal narrowing or spinal canal narrowing.    We have provided IV KCl for hypokalemia.  I believe that it would be reasonable to admit this patient given his syncopal event, mainly for cardiac monitoring and consideration of echocardiogram in the morning.  Given the fact that he previously was noted to have SVT, it certainly is a consideration that this could have caused his syncope today.    I have reviewed the nursing notes. I have reviewed the findings, diagnosis, plan and need for follow up with the patient.    New Prescriptions    No medications on file       Final diagnoses:   Syncope and collapse   Hypokalemia     Ermelinda Mcfadden MD  [facility] EMERGENCY DEPARTMENT  10/1/2024     Ermelinda Mcfadden,  MD  10/01/24 [id number]

## 2024-10-01 NOTE — ED TRIAGE NOTES
BIBA from home with c/c of syncope episode while standing at the sink. Dizziness and fainted. Hit his head on a stove door that is glass. Glass shards remain in pt. Arrived with c-collar. Not on thinners. Hx: HTN

## 2024-10-02 ENCOUNTER — APPOINTMENT (OUTPATIENT)
Dept: CARDIOLOGY | Facility: CLINIC | Age: 64
DRG: 641 | End: 2024-10-02
Payer: MEDICARE

## 2024-10-02 LAB
ANION GAP SERPL CALCULATED.3IONS-SCNC: 8 MMOL/L (ref 7–15)
BUN SERPL-MCNC: 13.7 MG/DL (ref 8–23)
CALCIUM SERPL-MCNC: 9.1 MG/DL (ref 8.8–10.4)
CHLORIDE SERPL-SCNC: 109 MMOL/L (ref 98–107)
CREAT SERPL-MCNC: 1.16 MG/DL (ref 0.67–1.17)
EGFRCR SERPLBLD CKD-EPI 2021: 69 ML/MIN/1.73M2
ERYTHROCYTE [DISTWIDTH] IN BLOOD BY AUTOMATED COUNT: 13.2 % (ref 10–15)
GLUCOSE SERPL-MCNC: 89 MG/DL (ref 70–99)
HCO3 SERPL-SCNC: 23 MMOL/L (ref 22–29)
HCT VFR BLD AUTO: 44.4 % (ref 40–53)
HGB BLD-MCNC: 14.5 G/DL (ref 13.3–17.7)
LVEF ECHO: NORMAL
MCH RBC QN AUTO: 28.6 PG (ref 26.5–33)
MCHC RBC AUTO-ENTMCNC: 32.7 G/DL (ref 31.5–36.5)
MCV RBC AUTO: 88 FL (ref 78–100)
PLATELET # BLD AUTO: 84 10E3/UL (ref 150–450)
POTASSIUM SERPL-SCNC: 3.8 MMOL/L (ref 3.4–5.3)
RBC # BLD AUTO: 5.07 10E6/UL (ref 4.4–5.9)
SODIUM SERPL-SCNC: 140 MMOL/L (ref 135–145)
TROPONIN T SERPL HS-MCNC: 12 NG/L
WBC # BLD AUTO: 5.2 10E3/UL (ref 4–11)

## 2024-10-02 PROCEDURE — 80048 BASIC METABOLIC PNL TOTAL CA: CPT

## 2024-10-02 PROCEDURE — 93306 TTE W/DOPPLER COMPLETE: CPT | Mod: 26 | Performed by: INTERNAL MEDICINE

## 2024-10-02 PROCEDURE — 250N000011 HC RX IP 250 OP 636

## 2024-10-02 PROCEDURE — 93306 TTE W/DOPPLER COMPLETE: CPT

## 2024-10-02 PROCEDURE — 250N000013 HC RX MED GY IP 250 OP 250 PS 637

## 2024-10-02 PROCEDURE — 36415 COLL VENOUS BLD VENIPUNCTURE: CPT

## 2024-10-02 PROCEDURE — 120N000002 HC R&B MED SURG/OB UMMC

## 2024-10-02 PROCEDURE — 82310 ASSAY OF CALCIUM: CPT

## 2024-10-02 PROCEDURE — 85027 COMPLETE CBC AUTOMATED: CPT

## 2024-10-02 RX ORDER — SPIRONOLACTONE 25 MG/1
25 TABLET ORAL DAILY
Status: CANCELLED | OUTPATIENT
Start: 2024-10-02

## 2024-10-02 RX ORDER — SPIRONOLACTONE 25 MG/1
25 TABLET ORAL DAILY
Status: DISCONTINUED | OUTPATIENT
Start: 2024-10-02 | End: 2024-10-03 | Stop reason: HOSPADM

## 2024-10-02 RX ORDER — POTASSIUM CHLORIDE 1.5 G/1.58G
40 POWDER, FOR SOLUTION ORAL ONCE
Status: COMPLETED | OUTPATIENT
Start: 2024-10-02 | End: 2024-10-02

## 2024-10-02 RX ADMIN — SPIRONOLACTONE 25 MG: 25 TABLET, FILM COATED ORAL at 10:40

## 2024-10-02 RX ADMIN — CLONIDINE HYDROCHLORIDE 0.1 MG: 0.1 TABLET ORAL at 19:53

## 2024-10-02 RX ADMIN — AMLODIPINE BESYLATE 10 MG: 10 TABLET ORAL at 08:02

## 2024-10-02 RX ADMIN — LOSARTAN POTASSIUM 100 MG: 50 TABLET, FILM COATED ORAL at 08:02

## 2024-10-02 RX ADMIN — Medication 1 CAPSULE: at 08:02

## 2024-10-02 RX ADMIN — ENOXAPARIN SODIUM 40 MG: 40 INJECTION SUBCUTANEOUS at 08:02

## 2024-10-02 RX ADMIN — Medication 1 TABLET: at 08:02

## 2024-10-02 RX ADMIN — ASPIRIN 81 MG: 81 TABLET ORAL at 08:02

## 2024-10-02 RX ADMIN — ATORVASTATIN CALCIUM 20 MG: 20 TABLET, FILM COATED ORAL at 19:53

## 2024-10-02 RX ADMIN — TENOFOVIR DISOPROXIL FUMARATE 300 MG: 300 TABLET, FILM COATED ORAL at 08:02

## 2024-10-02 RX ADMIN — POTASSIUM CHLORIDE 40 MEQ: 1.5 POWDER, FOR SOLUTION ORAL at 02:05

## 2024-10-02 ASSESSMENT — ACTIVITIES OF DAILY LIVING (ADL)
ADLS_ACUITY_SCORE: 25
ADLS_ACUITY_SCORE: 25
ADLS_ACUITY_SCORE: 36
ADLS_ACUITY_SCORE: 23
ADLS_ACUITY_SCORE: 36
ADLS_ACUITY_SCORE: 23
ADLS_ACUITY_SCORE: 23
ADLS_ACUITY_SCORE: 25
ADLS_ACUITY_SCORE: 23
ADLS_ACUITY_SCORE: 23
ADLS_ACUITY_SCORE: 36
ADLS_ACUITY_SCORE: 25
ADLS_ACUITY_SCORE: 25
ADLS_ACUITY_SCORE: 36
ADLS_ACUITY_SCORE: 25
ADLS_ACUITY_SCORE: 23
ADLS_ACUITY_SCORE: 25
ADLS_ACUITY_SCORE: 23
ADLS_ACUITY_SCORE: 23
ADLS_ACUITY_SCORE: 36
ADLS_ACUITY_SCORE: 23

## 2024-10-02 NOTE — PHARMACY-ADMISSION MEDICATION HISTORY
Pharmacist Admission Medication History    Admission medication history is complete. The information provided in this note is only as accurate as the sources available at the time of the update.    Information Source(s): Patient and CareEverywhere/SureScripts via in-person    Pertinent Information: Updated med list and last doses per patient's testimony.   Patient reports he doesn't take tenofovir, he didn't recognize the name of the medication or indication. Per surescripts, last fill was in April.     Changes made to PTA medication list:  Added: None  Deleted: Aspirin, malarone, docusate, MVI, psylium capsule, tenofovir  Changed: None    Allergies reviewed with patient and updates made in EHR: no    Medication History Completed By: Huber Melton RPH 10/2/2024 11:23 AM    PTA Med List   Medication Sig Last Dose    amLODIPine (NORVASC) 10 MG tablet Take 1 tablet (10 mg) by mouth daily 10/1/2024 at am    atorvastatin (LIPITOR) 20 MG tablet Take 1 tablet (20 mg) by mouth every evening 9/30/2024 at pm    carvedilol (COREG) 12.5 MG tablet Take 1 tablet (12.5 mg) by mouth 2 times daily (with meals) 10/1/2024 at afternoon    cloNIDine (CATAPRES) 0.1 MG tablet Take 1 tablet (0.1 mg) by mouth 2 times daily 10/1/2024 at afternoon    losartan (COZAAR) 100 MG tablet Take 1 tablet (100 mg) by mouth daily 10/1/2024 at am

## 2024-10-02 NOTE — PLAN OF CARE
Goal Outcome Evaluation:       BP (!) 157/90 (BP Location: Right arm)   Pulse 56   Temp 98.2  F (36.8  C) (Oral)   Resp 16   SpO2 100%     Pt endorse head pain, ice packs applied on the back of head. Denies nausea. Call light appropriate. PIV SL. Replaced Potassium this shift. Voids spontaneously, Bedside commode.        Overall Patient Progress: no changeOverall Patient Progress: no change    Outcome Evaluation: Pt endorse pain behing on the back head. Given ice packs,

## 2024-10-02 NOTE — H&P
[facility]    History and Physical - Medicine Service, [facility] TEAM        Date of Admission:  10/1/2024    Assessment & Plan      Geronimo Coats is a 64 year old male with a past medical history of syncope and collapse, chronic insomnia, CKD stage 2, hepatitis B, CVA, hypertension, and thrombocytopenia, who was brought to the ED via EMS following an episode of LOC while standing in his kitchen at home, found to have hypokalemia and bradycardia with first-degree heart block, concerning for vasovagal v. cardiogenic syncope.    #Recurrent Syncopal Episode   #Bradycardia  #First-Degree Heart Block  Unclear etiology, likely multifactorial. Possibly vasovagal given prodrome versus cardiogenic cause (per records TTE 3/2024 showing normal cardiac structure and function and holter monitor non-suggestive) versus medication (BP medications). Patient with abdominal warmth while eating preceding this episode and similar episode in 3/2024 suggestive of vasovagal physiology. Less likely neurogenic as no evidence of seizure (no urinary incontinence of tongue biting) or stroke (no weakness, sensation changes). Also no signs of hypoglycemia (). EKG in ED showing bradycardia with first-degree heart block, CT head negative for acute bleed. Patient currently asymptomatic.  - Consider EP cardiology discussion/consult in AM  - Telemetry  - Orthostatic BP  - KCl 40 mEq po once for potassium repletion  - Repeat BMP in AM    #Moderate Hypokalemia, asymptomatic  Unclear etiology, decreased intake versus medication related.  - replete potasium    #Medication Refractory HTN  #CKD, Stage 2  - Continue PTA amlodipine  - Continue PTA losartan  - Hold PTA carvedilol given bradycardia and syncopal episode  - Hold PTA Clonidine    #Thrombocytopenia  Unclear etiology, possible drug induced from tenofovir.   - CTM    #Chronic Hepatitis B   - Continue PTA tenofovir    #Hx CVA without residual  deficits  - Continue PTA ASA 81 mg po daily  - Continue PTA atorvastatin          Diet:  Regular adult diet  DVT Prophylaxis: Enoxaparin (Lovenox) SQ  Hood Catheter: Not present  Fluids: None  Lines: None     Cardiac Monitoring: None  Code Status:  Full code       Disposition Plan      Expected Discharge Date: 10/03/2024                The patient's care was discussed with the Attending Physician, Dr. Cox .      Vinod Dominique MD  Medicine Service, [facility]  Securely message with Vocera (more info)  Text page via [facility] Paging/Directory   See signed in provider for up to date coverage information  ______________________________________________________________________    Chief Complaint   LOC with prodrome    History is obtained from the patient    History of Present Illness   Geronimo Coats is a 64 year old male with a past medical history of syncope and collapse, chronic insomnia, CKD stage 2, hepatitis B, CVA, hypertension, and thrombocytopenia, who was brought to the ED via EMS following an episode of LOC while standing in his kitchen at home.    Patient reports he was eating at his kitchen table and got up to get a drink. As he approached his counter, he first felt warmth in his abdomen, which climbed to his chest and face, he then felt diaphoretic, dizzy and his vision dimmed. He awoke on the floor of his kitchen with his wife calling his name. He estimates he was unconscious for 10 minutes. He hit the back left occipital region of his head on his stove cover and had cuts from glass. When EMS arrived he felt like himself and was able to stand and walk to the stretcher without further dizziness or other symptoms. He denied any confusion after the event. He further denies CP, SOB, palpitations, tremors, focal weakness or sensation changes, tongue biting, urinary incontinence.    He reports he has been eating and drinking well and has not  been ill recently. He further denies any emotional stress. He recently traveled to [facility] from 8/1-9/6, but did not get sick while there or since returning. He reports having a chronic left inguinal hernia which does not hurt at the moment but was painful yesterday. He states he has not started any new medications in the past 6 months.    He reports he had the same symptoms in March 2024, for which he was hospitalized at [facility].. At that time, patient reports he was also eating at his kitchen table when he had warmth in his abdomen, followed by diaphoresis, dizziness and brief LOC. He underwent cardiac work-up during that admission with negative troponin, normal TTE and EKG showing only sinus bradycardia with first-degree AV block. Syncopal episode at that time was believed to be vasovagal in nature but patient was discharged home with a 2-week Holter monitor, which showed sinus rhythm with first-degree AV block and occasional SVT which was not clinically significant. Patient states he was informed that his holter test did not show anything wrong with his heart to explain his previous collapse.      Past Medical History    Past Medical History:   Diagnosis Date    Benign essential hypertension     Hyperlipidemia     Right pontine stroke (H) 10/01/2022       Past Surgical History   Past Surgical History:   Procedure Laterality Date    HERNIA REPAIR  1974    LAPAROSCOPIC CHOLECYSTECTOMY  2014       Prior to Admission Medications   Prior to Admission Medications   Prescriptions Last Dose Informant Patient Reported? Taking?   Multiple Vitamins-Minerals (ONE-A-DAY MENS 50+ PO)  Self Yes No   Sig: Take 1 tablet by mouth daily   amLODIPine (NORVASC) 10 MG tablet   No No   Sig: Take 1 tablet (10 mg) by mouth daily   aspirin (ASA) 81 MG EC tablet   No No   Sig: Take 1 tablet (81 mg) by mouth daily   atorvastatin (LIPITOR) 20 MG tablet   No No   Sig: Take 1 tablet (20 mg) by mouth  every evening   atovaquone-proguanil (MALARONE) 250-100 MG tablet   No No   Sig: Take 1 tablet by mouth daily Start 2 days before exposure to Malaria and continue daily till  7 days after exposure.   carvedilol (COREG) 12.5 MG tablet   No No   Sig: Take 1 tablet (12.5 mg) by mouth 2 times daily (with meals)   cloNIDine (CATAPRES) 0.1 MG tablet   No No   Sig: Take 1 tablet (0.1 mg) by mouth 2 times daily   docusate sodium (COLACE) 100 MG capsule   No No   Sig: Take 1 capsule (100 mg) by mouth 2 times daily as needed for constipation   losartan (COZAAR) 100 MG tablet   No No   Sig: Take 1 tablet (100 mg) by mouth daily   psyllium (METAMUCIL/KONSYL) capsule   No No   Sig: Take 1 capsule by mouth daily   sildenafil (VIAGRA) 50 MG tablet   No No   Sig: Take 1 tablet (50 mg) by mouth daily as needed (intercourse)   tenofovir (VIREAD) 300 MG tablet   No No   Sig: Take 1 tablet (300 mg) by mouth daily      Facility-Administered Medications: None        Review of Systems    The 10 point Review of Systems is negative other than noted in the HPI or here.     Social History   I have reviewed this patient's social history and updated it with pertinent information if needed.  Social History     Tobacco Use    Smoking status: Never     Passive exposure: Never    Smokeless tobacco: Never   Vaping Use    Vaping status: Never Used   Substance Use Topics    Alcohol use: Yes     Comment: rarely 2-3 times per year    Drug use: Never         Family History   I have reviewed this patient's family history and updated it with pertinent information if needed.  Family History   Problem Relation Age of Onset    Hypertension Sister     Liver Disease No family hx of     Cirrhosis No family hx of          Allergies   Allergies   Allergen Reactions    Chocolate     Nuts     Orange Fruit [Citrus] Cough        Physical Exam   Vital Signs: Temp: 97.7  F (36.5  C) Temp src: Oral BP: (!) 156/80 Pulse: 71   Resp: 17 SpO2: 100 % O2 Device: None (Room  air)    Weight: 0 lbs 0 oz    Constitutional: awake, alert, cooperative, no apparent distress, and appears stated age  Eyes: Lids and lashes normal, pupils equal, round and reactive to light, extra ocular muscles intact, sclera clear, conjunctiva normal  ENT: Normocephalic, without obvious abnormality, some scratches on left occipital region, no active bleeding. oral pharynx with moist mucous membranes, tonsils without erythema or exudates, gums normal.  Respiratory: No increased work of breathing, good air exchange, clear to auscultation bilaterally, no crackles or wheezing  Cardiovascular: Normal apical impulse, bradycardic, normal rhythm, normal S1 and S2, S4 noted. No murmur. Pedal pulses 2+. No peripheral edema.  GI: No scars, normal bowel sounds, soft, non-distended, non-tender, no hepatosplenomegally. Left inguinal hernia, reducible   Skin: Small scalp laceration on occipital region, not bleeding. No rashes.  Neurologic: Awake, alert, oriented to name, place and time.  Cranial nerves II-XII are grossly intact.  Motor is 5 out of 5 bilaterally.  Cerebellar finger to nose, heel to shin intact.  Sensory is intact.      Data   Most Recent 3 CBC's:  Recent Labs   Lab Test 10/01/24  1749 04/25/24  1145 04/18/24  1429   WBC 4.2 2.8* 3.1*   HGB 15.1 15.0 15.8   MCV 86 86 85   PLT 96* 100* 95*     Most Recent 3 BMP's:  Recent Labs   Lab Test 10/01/24  1826 10/01/24  1749 04/25/24  1145 04/18/24  1429   NA  --  141 143 145   POTASSIUM  --  3.0* 3.8 3.5   CHLORIDE  --  106 105 107   CO2  --  23 30* 26   BUN  --  18.8 16.7 17.4   CR  --  1.41* 1.37* 1.41*   ANIONGAP  --  12 8 12   EUGENIO  --  9.2 9.6 9.8   * 136* 96 72     Most Recent 2 LFT's:  Recent Labs   Lab Test 10/01/24  1749 04/25/24  1145   AST 37 40   ALT 27 40   ALKPHOS 82 96   BILITOTAL 0.8 0.6     Most Recent 3 INR's:  Recent Labs   Lab Test 10/01/24  1749 04/25/24  1145 04/18/24  1429   INR 1.24* 1.24* 1.22*     Troponin 13>12    INR  1.24    IMAGING  EXAM: CT HEAD W/O CONTRAST  10/1/2024 6:27 PM      HISTORY: syncope, fall, eval for bleed        COMPARISON: MRI brain 10/1/2022     TECHNIQUE: Using multidetector thin collimation helical acquisition  technique, axial, coronal and sagittal CT images from the skull base  to the vertex were obtained without intravenous contrast.   (topogram) image(s) also obtained and reviewed. Dose reduction  techniques were used.     FINDINGS:  No acute intracranial hemorrhage, mass effect, or midline shift. No  acute loss of gray-white matter differentiation in the cerebral  hemispheres. Ventricles are proportionate to the cerebral sulci. Clear  basal cisterns. Moderate scattered periventricular hypoattenuating  foci, as can be seen in chronic small vessel ischemic disease.  Multiple small chronic infarcts within bilateral centrum semiovale  bilaterally, left coronal radiata, bilateral thalami.      The bony calvaria and the bones of the skull base are normal. Mucosal  thickening at the base of the bilateral maxillary sinuses otherwise.  Mucous retention cysts in the maxillary sinus. Mastoid air cells are  clear. Grossly normal orbits.                                                                      IMPRESSION:   1. No acute intracranial pathology.  2. Multiple small chronic infarcts within bilateral centrum semiovale  bilaterally, left coronal radiata, and bilateral thalami.   .     I have personally reviewed the examination and initial interpretation  and I agree with the findings.     ANITA HERRON MD       EXAM: CT CERVICAL SPINE W/O CONTRAST  10/1/2024 6:28 PM      HISTORY:  syncope, fall, pain, eval for injury; Neck pain; Trauma;  Mild/moderate trauma; None of the following: Spondyloarthropathy,  cervical x-ray with negative result, questionable finding, or  inadequate coverage        COMPARISON:  Same day CT head without     TECHNIQUE: Using multidetector thin collimation helical  acquisition  technique, axial, coronal and sagittal CT images through the cervical  spine were obtained without intravenous contrast.     FINDINGS:  Straightening of the cervical lordosis. No acute fracture or traumatic  subluxation. Mild intervertebral disc height loss, most prominently at  C4-C5. No prevertebral edema. Presumed chronic C6 vertebral height  loss.  Grade 1 anterolisthesis at C7-T2. Corticated radiolucent line along  the T1 spinous process, may represent nonunion versus chronic  fracture.     The findings on a level by level basis are as follows:     C2-3:  No spinal canal or neural foraminal stenosis.     C3-4: Disc osteophyte complex with severe left and mild right neural  foraminal narrowing. No spinal canal narrowing.      C4-5: Disc osteophyte complex, left greater than right. Bilateral  severe neural foraminal narrowing. No spinal canal narrowing.     C5-6:  Uncovertebral spurring. Mild left neural foraminal stenosis. No  spinal canal or right neural foraminal stenosis.     C6-7:  No spinal canal or neural foraminal stenosis.     C7-T1:  No spinal canal or neural foraminal stenosis.     No abnormality of the paraspinous soft tissues.                                                                      IMPRESSION:  1. No acute fracture or traumatic subluxation.  2. Multilevel cervical degenerative changes with severe left C3-4 and  bilateral C4-5 neural foraminal. No high grade spinal canal narrowing.     I have personally reviewed the examination and initial interpretation  and I agree with the findings.     ANITA HERRON MD

## 2024-10-02 NOTE — PROGRESS NOTES
Admission diagnosis: syncopal episode with collapse  admitted from: ED  Belongings: clothing, shoes and cell phone. Remain with pt  Admission profile: completed  Telemetry : on  Height, weight : obtained and recorded  Skin assessment : 2 RN skin assessment completed by Carli FELDER

## 2024-10-02 NOTE — CONSULTS
Electrophysiology Inpatient Consultation  October 2, 2024    Reason for Consult:  An EP consult was requested by the medicine service to provide clinical guidance regarding syncope.    Assessment:  Syncopal episode most consistent with vasovagal syncope. No clear trigger but both episodes have occurred while eating. Additionally, patient had a small VITOR after both episodes. Low concern for arrhythmia given prodromal symptoms.     Recommendations:  -History consistent with vasovagal syncope. However, recommend getting tilt table study as outpatient to confirm diagnosis given age and recurrence.   -Encourage patient to remain hydrated as he is more susceptible when dehydrated.  -Recommend counterpressure maneuvers when patient feels prodromal symptoms. This includes tensing of leg, abdominal, and buttock muscles. He should also sit/lie on the floor as soon as able when prodromal symptoms start so that he does not fall and injure himself.  -Follow up with Dr. Monreal in clinic (video visit) with first available appointment.     Plan of care discussed with Dr. Monreal, who agrees with above plan.    Thank you for consulting the cardiovascular services at the [facility]. Please do not hesitate to call us with any questions.     Kaylin Mittal MD  Cardiology Fellow  Pager: [phone]    I very much appreciated the opportunity to  assess Geronimo Coats in the hospital with CV Fellow Dr Swanson. The note above summarizes my findings and current recommendations.  Please do not hesitate to contact my office if you have any questions or concerns.      Reinier Monreal MD  Cardiac Arrhythmia Service  [facility]  [phone]     HPI:   Geronimo Coats is a 64 year old male with a past medical history of CKD stage 2, hepatitis B, CVA, hypertension, and thrombocytopenia, who presents after a syncopal episode.     Patient reports that he was eating around 1pm on 10/1 when  he decided to get some water. While he was filling his cup in the sink, he suddenly felt warmth come over his body. He had associated nasuea, sweating, and lightheadedness. He then passed out, fell onto the stove, then to the floor. His wife heard from another room and called EMS when she found him passed out. Patient reports that he has been feeling well, eating and drinking normally, and nothing out of the ordinary in the days preceding. Initial workup notable for slightly low K, mild VITOR with Cr 1.4, normal trop, platelets 90s. ECG with sinus bradycardia with first degree AV block.     This was the patient's second episode of syncope. His first episode occurred in 3/2024 for which he was admitted to [facility]. Similarly, he was eating food when a warmth came over him. He passed out sitting in his chair at the table. At that time, he had mild VITOR, normal troponin. TTE on 10/1/22 showed LVEF 55%, mild LVH. Suspected to be vagal and he was discharged home. Holter monitor after discharge showed largely sinus rhythm with first degree AV block and 1.8% asymptomatic SVT.     Home medications include amlodipine, ASA, atorvastatin, carvedilol, clonidine, losartan, sildenafil.     Review of Systems:    Complete review of systems was performed and negative except per HPI.    PMH:  Past Medical History:   Diagnosis Date    Benign essential hypertension     Hyperlipidemia     Right pontine stroke (H) 10/01/2022     Active Problems:  Patient Active Problem List    Diagnosis Date Noted    Syncope and collapse 10/01/2024     Priority: Medium    Hypokalemia 10/01/2024     Priority: Medium    Chronic kidney disease, stage 2 (mild) 11/16/2023     Priority: Medium    Chronic insomnia 12/21/2022     Priority: Medium    Hyperaldosteronism (H) 10/11/2022     Priority: Medium     Eliud : renin ratio 118 10/2022      History of CVA (cerebrovascular accident) 10/01/2022     Priority: Medium     Presented 10/1/22 with L hemibody numbness,  multifocal bilateral micro-hemorrhages and punctate infarct of R posterior deneen on MR felt to be 2/2 microvascular angiopathy 2/2 high grade medication-refractory hypertension.       Thrombocytopenia (H) 04/08/2022     Priority: Medium     Since at least 2019      Hepatitis B, chronic (H) 11/24/2020     Priority: Medium     June 2019 positive surface antigen and positive core antibody.   November 2020 surface antigen was negative, surface antibody was not protective at 0.79, core antibody was positive.    December 2020 additional labs ordered by GI but not completed...      Gastroesophageal reflux disease 04/18/2016     Priority: Medium    Hypertension 04/18/2016     Priority: Medium     Social History:  Social History     Tobacco Use    Smoking status: Never     Passive exposure: Never    Smokeless tobacco: Never   Vaping Use    Vaping status: Never Used   Substance Use Topics    Alcohol use: Yes     Comment: rarely 2-3 times per year    Drug use: Never     Family History:  Family History   Problem Relation Age of Onset    Hypertension Sister     Liver Disease No family hx of     Cirrhosis No family hx of        Medications:  Current Facility-Administered Medications   Medication Dose Route Frequency Provider Last Rate Last Admin    amLODIPine (NORVASC) tablet 10 mg  10 mg Oral Daily Vinod Dominique MD   10 mg at 10/02/24 0802    aspirin EC tablet 81 mg  81 mg Oral Daily Vinod Dominique MD   81 mg at 10/02/24 0802    atorvastatin (LIPITOR) tablet 20 mg  20 mg Oral QPM Vinod Dominique MD   20 mg at 10/01/24 3223    [Held by provider] carvedilol (COREG) tablet 12.5 mg  12.5 mg Oral BID w/meals Vinod Dominique MD        [Held by provider] cloNIDine (CATAPRES) tablet 0.1 mg  0.1 mg Oral BID Vinod Dominique MD        enoxaparin ANTICOAGULANT (LOVENOX) injection 40 mg  40 mg Subcutaneous Q24H Vinod Dominique MD   40 mg at 10/02/24 0802    losartan (COZAAR) tablet 100 mg  100 mg Oral Daily  Vinod Dominique MD   100 mg at 10/02/24 0802    multivitamin w/minerals (THERA-VIT-M) tablet 1 tablet  1 tablet Oral Daily Vindo Dominique MD   1 tablet at 10/02/24 0802    psyllium (METAMUCIL/KONSYL) capsule 1 capsule  1 capsule Oral Daily Vinod Dominique MD   1 capsule at 10/02/24 0802    sodium chloride (PF) 0.9% PF flush 3 mL  3 mL Intracatheter Q8H Vinod Dominique MD   3 mL at 10/02/24 0530    tenofovir (VIREAD) tablet 300 mg  300 mg Oral Daily Vinod Dominique MD   300 mg at 10/02/24 0802       Current Facility-Administered Medications   Medication Dose Route Frequency Provider Last Rate Last Admin       Physical Exam:  Temp:  [97.5  F (36.4  C)-98.2  F (36.8  C)] 98.2  F (36.8  C)  Pulse:  [54-71] 59  Resp:  [16-18] 18  BP: (124-157)/(69-91) 154/91  SpO2:  [98 %-100 %] 99 %    Intake/Output Summary (Last 24 hours) at 10/2/2024 0848  Last data filed at 10/2/2024 0500  Gross per 24 hour   Intake 320 ml   Output 150 ml   Net 170 ml     GEN: pleasant, no acute distress  HEENT: no icterus  CV: RRR, normal S1/S2, no murmurs/rubs/S3/S4, JVP normal  CHEST: clear to ausculation bilaterally, no crackles, rales, or wheezing  ABD: soft, non-tender  EXTR: no clubbing, cyanosis, or edema  NEURO: alert oriented, speech fluent/appropriate, motor grossly nonfocal  PSYCH: cooperative, affect appropriate, pleasant      Diagnostics:  All labs and imaging were reviewed, of note:    CMP  Recent Labs   Lab 10/02/24  0538 10/01/24  1826 10/01/24  1749     --  141   POTASSIUM 3.8  --  3.0*   CHLORIDE 109*  --  106   CO2 23  --  23   ANIONGAP 8  --  12   GLC 89 155* 136*   BUN 13.7  --  18.8   CR 1.16  --  1.41*   GFRESTIMATED 69  --  55*   EUGENIO 9.1  --  9.2   MAG  --   --  2.1   PROTTOTAL  --   --  7.0   ALBUMIN  --   --  3.9   BILITOTAL  --   --  0.8   ALKPHOS  --   --  82   AST  --   --  37   ALT  --   --  27     CBC  Recent Labs   Lab 10/02/24  0538 10/01/24  1749   WBC 5.2 4.2   RBC 5.07 5.21   HGB  14.5 15.1   HCT 44.4 44.7   MCV 88 86   MCH 28.6 29.0   MCHC 32.7 33.8   RDW 13.2 13.1   PLT 84* 96*     INR  Recent Labs   Lab 10/01/24  1749   INR 1.24*     Arterial Blood GasNo lab results found in last 7 days.    Lab Results   Component Value Date    TROPI <0.015 06/07/2021

## 2024-10-02 NOTE — PROGRESS NOTES
[facility]    Progress Note - Medicine Service, [facility] TEAM 1       Date of Admission:  10/1/2024    Assessment & Plan   Geronimo Coats is a 64 year old male with a past medical history of syncope and collapse, chronic insomnia, CKD stage 2, hepatitis B, CVA, hypertension, and thrombocytopenia, who was brought to the ED via EMS following an episode of LOC while standing in his kitchen at home, found to have hypokalemia and bradycardia with first-degree heart block, concerning for vasovagal v. cardiogenic syncope. Likely multifactorial cause of syncope including medications, vasovagal syndrome, and hypovolemia.    Updates:  - Discontinued PTA carvedilol  - Started spironolactone 25 mg daily  - ECHO today, results normal  - EP consulted  - Orthostatic BP planned to be obtained by nursing staff     #Recurrent Syncopal Episode   #Bradycardia  #First-Degree Heart Block  Unclear etiology, likely multifactorial. Possibly vasovagal given prodrome versus cardiogenic cause (per records TTE 3/2024 showing normal cardiac structure and function and holter monitor non-suggestive) versus medication (BP medications). Patient with abdominal warmth while eating preceding this episode and similar episode in 3/2024 suggestive of vasovagal physiology. Repeat ECHO obtained 10/2 given second episode; results normal. Less likely neurogenic as no evidence of seizure (no urinary incontinence of tongue biting) or stroke (no weakness, sensation changes). Also no signs of hypoglycemia (). EKG in ED showing bradycardia with first-degree heart block, CT head negative for acute bleed. Patient still asymptomatic. While EP intervention unlikely given first degree AV block, consulted to at least establish care. Will focus on adjusting medication management to prevent bradycardia and focusing on non-pharmacologic management such as compression socks, increasing water intake, and discussing  what to do when prodromal symptoms occur.  - EP cardiology consulted, appreciate recs  - Telemetry  - Orthostatic BP  - Encourage increased water intake (6+ bottles/day)      #Medication Refractory HTN  #CKD, Stage 2  #Hyperaldosteronism  Diagnosed with hyperaldosteronism in 2022, but unable to find any follow-up or medications for this. Since he is still hypertensive despite 4 medications and is having bradycardia and syncopal episodes, plan to change carvedilol to spironolactone to start with.   - Continue PTA amlodipine  - Continue PTA losartan  - Discontinued PTA carvedilol given bradycardia and second syncopal episode  - Started spironolactone 25 mg daily  - Hold PTA Clonidine    #Moderate Hypokalemia, asymptomatic, resolved  Unclear etiology, decreased intake versus medication related. Resolved after initial repletion. Will replete as needed.  - CTM     #Thrombocytopenia  Unclear etiology, possible drug induced from tenofovir.   - CTM     #Chronic Hepatitis B   - Continue PTA tenofovir (patient indicated not taking during med rec)  - Will need follow up with hepatology because of cirrhosis and to determine medication regiment, was previously seen at [facility]     #Hx CVA without residual deficits  - Continue PTA ASA 81 mg po daily  - Continue PTA atorvastatin        Diet: Regular Diet Adult    DVT Prophylaxis: Enoxaparin (Lovenox) SQ  Hood Catheter: Not present  Fluids: None  Lines: None     Cardiac Monitoring: ACTIVE order. Indication: Syncope- high cardiac risk (48 hours)  Code Status: Full Code      Clinically Significant Risk Factors Present on Admission        # Hypokalemia: Lowest K = 3 mmol/L in last 2 days, will replace as needed        # Coagulation Defect: INR = 1.24 (Ref range: 0.85 - 1.15) and/or PTT = N/A, will monitor for bleeding  # Drug Induced Platelet Defect: home medication list includes an antiplatelet medication   # Hypertension: Noted on problem list                    Disposition Plan       Expected Discharge Date: 10/03/2024      Destination: home          The patient's care was discussed with the Attending Physician, Dr. Vu .    Citlalli Ellis MD  Medicine Service, [facility] TEAM [address] [place] [address]  Securely message with [employer] (more info)  Text page via [facility] Paging/Directory   See signed in provider for up to date coverage information  ______________________________________________________________________    Interval History   Feeling better this morning. No dizziness, lightheadedness, heart palpitations. Still some pain in back of head where he hit it yesterday but otherwise okay. No headaches. No other new concerns.    He said he typically drinks 4 water bottles (~12-16oz bottles) a day.     Physical Exam   Vital Signs: Temp: 98.3  F (36.8  C) Temp src: Oral BP: 130/74 Pulse: 68   Resp: 18 SpO2: 96 % O2 Device: None (Room air)    Weight: 142 lbs 6.67 oz    General Appearance: Resting in bed, alert, cooperative, no acute distress  Respiratory: CTAB on anterior exam, no wheezing, no crackles  Cardiovascular: RRR, no murmurs noted.   GI: soft, non-distended  Skin: no rashes  Neuro: no focal deficits    Data   Recent Results (from the past 24 hour(s))   Comprehensive metabolic panel    Collection Time: 10/01/24  5:49 PM   Result Value Ref Range    Sodium 141 135 - 145 mmol/L    Potassium 3.0 (L) 3.4 - 5.3 mmol/L    Carbon Dioxide (CO2) 23 22 - 29 mmol/L    Anion Gap 12 7 - 15 mmol/L    Urea Nitrogen 18.8 8.0 - 23.0 mg/dL    Creatinine 1.41 (H) 0.67 - 1.17 mg/dL    GFR Estimate 55 (L) >60 mL/min/1.73m2    Calcium 9.2 8.8 - 10.4 mg/dL    Chloride 106 98 - 107 mmol/L    Glucose 136 (H) 70 - 99 mg/dL    Alkaline Phosphatase 82 40 - 150 U/L    AST 37 0 - 45 U/L    ALT 27 0 - 70 U/L    Protein Total 7.0 6.4 - 8.3 g/dL    Albumin 3.9 3.5 - 5.2 g/dL    Bilirubin Total 0.8 <=1.2 mg/dL   INR    Collection Time: 10/01/24  5:49 PM   Result Value Ref Range    INR  1.24 (H) 0.85 - 1.15   Troponin T, High Sensitivity    Collection Time: 10/01/24  5:49 PM   Result Value Ref Range    Troponin T, High Sensitivity 13 <=22 ng/L   Magnesium    Collection Time: 10/01/24  5:49 PM   Result Value Ref Range    Magnesium 2.1 1.7 - 2.3 mg/dL   CBC with platelets and differential    Collection Time: 10/01/24  5:49 PM   Result Value Ref Range    WBC Count 4.2 4.0 - 11.0 10e3/uL    RBC Count 5.21 4.40 - 5.90 10e6/uL    Hemoglobin 15.1 13.3 - 17.7 g/dL    Hematocrit 44.7 40.0 - 53.0 %    MCV 86 78 - 100 fL    MCH 29.0 26.5 - 33.0 pg    MCHC 33.8 31.5 - 36.5 g/dL    RDW 13.1 10.0 - 15.0 %    Platelet Count 96 (L) 150 - 450 10e3/uL    % Neutrophils 67 %    % Lymphocytes 24 %    % Monocytes 7 %    % Eosinophils 1 %    % Basophils 0 %    % Immature Granulocytes 1 %    NRBCs per 100 WBC 0 <1 /100    Absolute Neutrophils 2.8 1.6 - 8.3 10e3/uL    Absolute Lymphocytes 1.0 0.8 - 5.3 10e3/uL    Absolute Monocytes 0.3 0.0 - 1.3 10e3/uL    Absolute Eosinophils 0.1 0.0 - 0.7 10e3/uL    Absolute Basophils 0.0 0.0 - 0.2 10e3/uL    Absolute Immature Granulocytes 0.0 <=0.4 10e3/uL    Absolute NRBCs 0.0 10e3/uL   EKG 12 lead    Collection Time: 10/01/24  6:23 PM   Result Value Ref Range    Systolic Blood Pressure  mmHg    Diastolic Blood Pressure  mmHg    Ventricular Rate 59 BPM    Atrial Rate 59 BPM    OH Interval 230 ms    QRS Duration 72 ms     ms    QTc 423 ms    P Axis 68 degrees    R AXIS 38 degrees    T Axis 265 degrees    Interpretation ECG       Sinus bradycardia with 1st degree A-V block with Premature atrial complexes  Nonspecific T wave abnormality  Abnormal ECG  Unconfirmed report - interpretation of this ECG is computer generated - see medical record for final interpretation  Confirmed by - EMERGENCY ROOM, PHYSICIAN ([id number]),  CHRISTIANO HELM ([id number]) on 10/1/2024 6:25:10 PM     Glucose by meter    Collection Time: 10/01/24  6:26 PM   Result Value Ref Range    GLUCOSE BY METER POCT 155 (H)  70 - 99 mg/dL   Troponin T, High Sensitivity    Collection Time: 10/01/24 10:23 PM   Result Value Ref Range    Troponin T, High Sensitivity 12 <=22 ng/L   Basic metabolic panel    Collection Time: 10/02/24  5:38 AM   Result Value Ref Range    Sodium 140 135 - 145 mmol/L    Potassium 3.8 3.4 - 5.3 mmol/L    Chloride 109 (H) 98 - 107 mmol/L    Carbon Dioxide (CO2) 23 22 - 29 mmol/L    Anion Gap 8 7 - 15 mmol/L    Urea Nitrogen 13.7 8.0 - 23.0 mg/dL    Creatinine 1.16 0.67 - 1.17 mg/dL    GFR Estimate 69 >60 mL/min/1.73m2    Calcium 9.1 8.8 - 10.4 mg/dL    Glucose 89 70 - 99 mg/dL   CBC with platelets    Collection Time: 10/02/24  5:38 AM   Result Value Ref Range    WBC Count 5.2 4.0 - 11.0 10e3/uL    RBC Count 5.07 4.40 - 5.90 10e6/uL    Hemoglobin 14.5 13.3 - 17.7 g/dL    Hematocrit 44.4 40.0 - 53.0 %    MCV 88 78 - 100 fL    MCH 28.6 26.5 - 33.0 pg    MCHC 32.7 31.5 - 36.5 g/dL    RDW 13.2 10.0 - 15.0 %    Platelet Count 84 (L) 150 - 450 10e3/uL     Imaging results reviewed over the past 24 hrs:   Recent Results (from the past 24 hour(s))   Head CT w/o contrast    Narrative    EXAM: CT HEAD W/O CONTRAST  10/1/2024 6:27 PM     HISTORY: syncope, fall, eval for bleed       COMPARISON: MRI brain 10/1/2022    TECHNIQUE: Using multidetector thin collimation helical acquisition  technique, axial, coronal and sagittal CT images from the skull base  to the vertex were obtained without intravenous contrast.   (topogram) image(s) also obtained and reviewed. Dose reduction  techniques were used.    FINDINGS:  No acute intracranial hemorrhage, mass effect, or midline shift. No  acute loss of gray-white matter differentiation in the cerebral  hemispheres. Ventricles are proportionate to the cerebral sulci. Clear  basal cisterns. Moderate scattered periventricular hypoattenuating  foci, as can be seen in chronic small vessel ischemic disease.  Multiple small chronic infarcts within bilateral centrum semiovale  bilaterally,  left coronal radiata, bilateral thalami.     The bony calvaria and the bones of the skull base are normal. Mucosal  thickening at the base of the bilateral maxillary sinuses otherwise.  Mucous retention cysts in the maxillary sinus. Mastoid air cells are  clear. Grossly normal orbits.      Impression    IMPRESSION:   1. No acute intracranial pathology.  2. Multiple small chronic infarcts within bilateral centrum semiovale  bilaterally, left coronal radiata, and bilateral thalami.   .    I have personally reviewed the examination and initial interpretation  and I agree with the findings.    ANITA HERRON MD         SYSTEM ID:  [id number]   CT Cervical Spine w/o Contrast    Narrative    EXAM: CT CERVICAL SPINE W/O CONTRAST  10/1/2024 6:28 PM     HISTORY:  syncope, fall, pain, eval for injury; Neck pain; Trauma;  Mild/moderate trauma; None of the following: Spondyloarthropathy,  cervical x-ray with negative result, questionable finding, or  inadequate coverage       COMPARISON:  Same day CT head without    TECHNIQUE: Using multidetector thin collimation helical acquisition  technique, axial, coronal and sagittal CT images through the cervical  spine were obtained without intravenous contrast.    FINDINGS:  Straightening of the cervical lordosis. No acute fracture or traumatic  subluxation. Mild intervertebral disc height loss, most prominently at  C4-C5. No prevertebral edema. Presumed chronic C6 vertebral height  loss.  Grade 1 anterolisthesis at C7-T2. Corticated radiolucent line along  the T1 spinous process, may represent nonunion versus chronic  fracture.    The findings on a level by level basis are as follows:    C2-3:  No spinal canal or neural foraminal stenosis.    C3-4: Disc osteophyte complex with severe left and mild right neural  foraminal narrowing. No spinal canal narrowing.     C4-5: Disc osteophyte complex, left greater than right. Bilateral  severe neural foraminal narrowing. No spinal canal  narrowing.    C5-6:  Uncovertebral spurring. Mild left neural foraminal stenosis. No  spinal canal or right neural foraminal stenosis.    C6-7:  No spinal canal or neural foraminal stenosis.    C7-T1:  No spinal canal or neural foraminal stenosis.     No abnormality of the paraspinous soft tissues.      Impression    IMPRESSION:  1. No acute fracture or traumatic subluxation.  2. Multilevel cervical degenerative changes with severe left C3-4 and  bilateral C4-5 neural foraminal. No high grade spinal canal narrowing.    I have personally reviewed the examination and initial interpretation  and I agree with the findings.    ANITA HERRON MD         SYSTEM ID:  [id number]

## 2024-10-02 NOTE — PLAN OF CARE
"Goal Outcome Evaluation:      Plan of Care Reviewed With: patient    Overall Patient Progress: improvingOverall Patient Progress: improving       BP (!) 157/84 (BP Location: Right arm)   Pulse 59   Temp 98.2  F (36.8  C) (Oral)   Resp 18   Ht 1.676 m (5' 6\")   Wt 64.6 kg (142 lb 6.7 oz)   SpO2 99%   BMI 22.99 kg/m      Admit: admitted from ED for syncopal episode with LOC  Neuro: A&Ox4.   Cardiac: Afebrile, HTN- below notification parameters. Intermittent bradycardia. No reports of dizziness with position changes. Telemetry maintained   Respiratory: RA   GI/: Voiding spontaneously. No BM this shift.   Diet/appetite: Tolerating diet. Denies nausea   Activity: Up with assist of 2/gb to bsc or to use urinal bedside   Pain: . Denies   Skin: abrasion to posterior scalp. Skin otherwise intact.  Lines: piv sl'd    Rested btwn cars. Able to make needs known. Continue to monitor. Notify MD of changes/concerns       Problem: Adult Inpatient Plan of Care  Goal: Readiness for Transition of Care  Outcome: Progressing     Problem: Fall Injury Risk  Goal: Absence of Fall and Fall-Related Injury  Outcome: Progressing  Intervention: Identify and Manage Contributors  Recent Flowsheet Documentation  Taken 10/2/2024 0506 by Sharron Mohr RN  Medication Review/Management: medications reviewed  Intervention: Promote Injury-Free Environment  Recent Flowsheet Documentation  Taken 10/2/2024 0506 by Sharron Mohr, RN  Safety Promotion/Fall Prevention:   activity supervised   clutter free environment maintained   lighting adjusted   mobility aid in reach   nonskid shoes/slippers when out of bed   room near nurse's station   room organization consistent   safety round/check completed     "

## 2024-10-02 NOTE — PLAN OF CARE
Assumed cares [id number]. A&Ox4. VSS on RA. Orthostatics done. Tele NSR. Denied pain. Up SBA. No syncopal episodes or dizziness. Reg diet, good appetite. L PIV saline locked. ECHO done. Possible discharge home tomorrow after PT eval.    Goal Outcome Evaluation:      Plan of Care Reviewed With: patient    Overall Patient Progress: improving

## 2024-10-03 ENCOUNTER — APPOINTMENT (OUTPATIENT)
Dept: PHYSICAL THERAPY | Facility: CLINIC | Age: 64
DRG: 641 | End: 2024-10-03
Payer: MEDICARE

## 2024-10-03 VITALS
BODY MASS INDEX: 22.89 KG/M2 | SYSTOLIC BLOOD PRESSURE: 138 MMHG | OXYGEN SATURATION: 99 % | RESPIRATION RATE: 18 BRPM | TEMPERATURE: 98 F | WEIGHT: 142.42 LBS | DIASTOLIC BLOOD PRESSURE: 88 MMHG | HEART RATE: 69 BPM | HEIGHT: 66 IN

## 2024-10-03 LAB
ANION GAP SERPL CALCULATED.3IONS-SCNC: 8 MMOL/L (ref 7–15)
BUN SERPL-MCNC: 14.3 MG/DL (ref 8–23)
CALCIUM SERPL-MCNC: 8.9 MG/DL (ref 8.8–10.4)
CHLORIDE SERPL-SCNC: 106 MMOL/L (ref 98–107)
CREAT SERPL-MCNC: 1.18 MG/DL (ref 0.67–1.17)
EGFRCR SERPLBLD CKD-EPI 2021: 68 ML/MIN/1.73M2
ERYTHROCYTE [DISTWIDTH] IN BLOOD BY AUTOMATED COUNT: 12.9 % (ref 10–15)
GLUCOSE SERPL-MCNC: 87 MG/DL (ref 70–99)
HCO3 SERPL-SCNC: 23 MMOL/L (ref 22–29)
HCT VFR BLD AUTO: 42.9 % (ref 40–53)
HGB BLD-MCNC: 14.6 G/DL (ref 13.3–17.7)
MCH RBC QN AUTO: 28.7 PG (ref 26.5–33)
MCHC RBC AUTO-ENTMCNC: 34 G/DL (ref 31.5–36.5)
MCV RBC AUTO: 84 FL (ref 78–100)
PLATELET # BLD AUTO: 93 10E3/UL (ref 150–450)
POTASSIUM SERPL-SCNC: 3.1 MMOL/L (ref 3.4–5.3)
RBC # BLD AUTO: 5.08 10E6/UL (ref 4.4–5.9)
SODIUM SERPL-SCNC: 137 MMOL/L (ref 135–145)
WBC # BLD AUTO: 4 10E3/UL (ref 4–11)

## 2024-10-03 PROCEDURE — 85027 COMPLETE CBC AUTOMATED: CPT

## 2024-10-03 PROCEDURE — 99239 HOSP IP/OBS DSCHRG MGMT >30: CPT | Mod: GC | Performed by: STUDENT IN AN ORGANIZED HEALTH CARE EDUCATION/TRAINING PROGRAM

## 2024-10-03 PROCEDURE — 250N000013 HC RX MED GY IP 250 OP 250 PS 637

## 2024-10-03 PROCEDURE — 250N000011 HC RX IP 250 OP 636

## 2024-10-03 PROCEDURE — 36415 COLL VENOUS BLD VENIPUNCTURE: CPT

## 2024-10-03 PROCEDURE — 999N000111 HC STATISTIC OT IP EVAL DEFER

## 2024-10-03 PROCEDURE — 97116 GAIT TRAINING THERAPY: CPT | Mod: GP

## 2024-10-03 PROCEDURE — 99253 IP/OBS CNSLTJ NEW/EST LOW 45: CPT | Mod: GC | Performed by: INTERNAL MEDICINE

## 2024-10-03 PROCEDURE — 80048 BASIC METABOLIC PNL TOTAL CA: CPT

## 2024-10-03 PROCEDURE — 97530 THERAPEUTIC ACTIVITIES: CPT | Mod: GP

## 2024-10-03 PROCEDURE — 97161 PT EVAL LOW COMPLEX 20 MIN: CPT | Mod: GP

## 2024-10-03 RX ORDER — SPIRONOLACTONE 25 MG/1
25 TABLET ORAL DAILY
Qty: 30 TABLET | Refills: 0 | Status: SHIPPED | OUTPATIENT
Start: 2024-10-03

## 2024-10-03 RX ORDER — TENOFOVIR DISOPROXIL FUMARATE 300 MG/1
300 TABLET, FILM COATED ORAL DAILY
Qty: 90 TABLET | Refills: 3 | Status: SHIPPED | OUTPATIENT
Start: 2024-10-03 | End: 2024-10-03

## 2024-10-03 RX ORDER — POTASSIUM CHLORIDE 1.5 G/1.58G
40 POWDER, FOR SOLUTION ORAL ONCE
Status: COMPLETED | OUTPATIENT
Start: 2024-10-03 | End: 2024-10-03

## 2024-10-03 RX ORDER — POTASSIUM CHLORIDE 1500 MG/1
20 TABLET, EXTENDED RELEASE ORAL DAILY
Qty: 10 TABLET | Refills: 0 | Status: SHIPPED | OUTPATIENT
Start: 2024-10-03

## 2024-10-03 RX ADMIN — AMLODIPINE BESYLATE 10 MG: 10 TABLET ORAL at 08:48

## 2024-10-03 RX ADMIN — LOSARTAN POTASSIUM 100 MG: 50 TABLET, FILM COATED ORAL at 08:48

## 2024-10-03 RX ADMIN — SPIRONOLACTONE 25 MG: 25 TABLET, FILM COATED ORAL at 08:48

## 2024-10-03 RX ADMIN — POTASSIUM CHLORIDE 40 MEQ: 1.5 POWDER, FOR SOLUTION ORAL at 08:48

## 2024-10-03 RX ADMIN — Medication 1 CAPSULE: at 08:48

## 2024-10-03 RX ADMIN — ASPIRIN 81 MG: 81 TABLET ORAL at 08:48

## 2024-10-03 RX ADMIN — ENOXAPARIN SODIUM 40 MG: 40 INJECTION SUBCUTANEOUS at 08:48

## 2024-10-03 RX ADMIN — CLONIDINE HYDROCHLORIDE 0.1 MG: 0.1 TABLET ORAL at 08:48

## 2024-10-03 RX ADMIN — TENOFOVIR DISOPROXIL FUMARATE 300 MG: 300 TABLET, FILM COATED ORAL at 08:48

## 2024-10-03 RX ADMIN — Medication 1 TABLET: at 08:48

## 2024-10-03 ASSESSMENT — ACTIVITIES OF DAILY LIVING (ADL)
ADLS_ACUITY_SCORE: 23
ADLS_ACUITY_SCORE: 25
ADLS_ACUITY_SCORE: 25
ADLS_ACUITY_SCORE: 23
ADLS_ACUITY_SCORE: 25
ADLS_ACUITY_SCORE: 23

## 2024-10-03 NOTE — DISCHARGE SUMMARY
[facility]  Discharge Summary - Medicine & Pediatrics       Date of Admission:  10/1/2024  Date of Discharge:  10/3/2024  4:40 PM  Discharging Provider: Dr. Jesus Vu  Discharge Service: Medicine Service, JUDITH TEAM 1    Discharge Diagnoses   Recurrent Syncopal Episode   Moderate Hypokalemia, asymptomatic  Thrombocytopenia  Bradycardia  First-Degree Heart Block  Hyperaldosteronism  Medication Refractory HTN    Chronic:  Medication Refractory HTN  CKD, Stage 2  Hyperaldosteronism  Chronic Hepatitis B   Hx CVA without residual deficits    Clinically Significant Risk Factors          Follow-ups Needed After Discharge   Follow-up Appointments     Adult [facility] Follow-up and recommended labs and tests      Follow up with primary care provider, Michele Saldana, within 7 days for   hospital follow- up. Please check a BMP.    Follow up with Dr. Monreal (electrophysiology cardiology) in clinic (video   visit) with first available appointment to evaluate syncopal symptoms.     Follow up with hepatology at next available appointment to follow up on   your hepatitis B and ensure you are on the proper medication. You have an   appointment scheduled for 10/24 at 1:30PM. You can call [phone] to   schedule a follow up appointment if this does not work for you.     Appointments on [place] and/or [facility] (with [facility] or [facility]   provider or service). Call [phone] if you haven't heard regarding   these appointments within 7 days of discharge.            Discharge Disposition   Discharged to home  Condition at discharge: Stable    Hospital Course   Geronimo Coats is a 64 year old male with a past medical history of syncope and collapse, chronic insomnia, CKD stage 2, hepatitis B, CVA, hypertension, and thrombocytopenia, who was brought to the ED via EMS following an episode of LOC while standing in his kitchen at home, found to have hypokalemia and bradycardia with  first-degree heart block, concerning for vasovagal v. cardiogenic syncope. After workup, believed to be multifactorial cause of syncope including medications causing bradycardia, vasovagal syndrome, and hypovolemia due to not enough water intake.    The following problems were addressed during his hospitalization:    #Recurrent Syncopal Episode   #Bradycardia  #First-Degree Heart Block  Unclear etiology, likely multifactorial. Possibly vasovagal given prodrome versus cardiogenic cause (per records TTE 3/2024 showing normal cardiac structure and function and holter monitor non-suggestive) versus medication (BP medications). Patient with abdominal warmth while eating preceding this episode and similar episode in 3/2024 suggestive of vasovagal physiology. Repeat ECHO obtained 10/2/24 given second episode; results normal. Orthostatic BP normal and not suggestive of orthostasis but did get fluids prior to testing. No hypoglycemia (). EKG in ED showing bradycardia with first-degree heart block, CT head negative for acute bleed. Patient still asymptomatic. While EP intervention unlikely given first degree AV block, consulted to at least establish care. Planning for outpatient follow-up. Focused on adjusting medication management to prevent bradycardia (switched to spironolactone from carvedilol) and focusing on non-pharmacologic management such as compression socks, increasing water intake, and discussing what to do when prodromal symptoms occur.  - Follow-up with EP (Dr. Monreal) in clinic   -Planning for tilt table study  - Discussed what to do when feeling prodromal symptoms including:   -tensing of leg, abdominal, and buttock muscles    -lay down on the floor as soon as they start so as not to fall  - Encouraged increased fluid intake (at least six 16oz bottles of water per day)     #Medication Refractory HTN  #CKD, Stage 2  #Hyperaldosteronism  Diagnosed with hyperaldosteronism in 2022, but unable to find any  follow-up or medications for this. Since he is still hypertensive despite 4 medications and is having bradycardia and syncopal episodes, plan to change carvedilol to spironolactone to start with.   - Continue PTA amlodipine 10 mg daily  - Continue PTA losartan 100 mg daily  - Discontinued PTA carvedilol given bradycardia and second syncopal episode  - Continue (new) spironolactone 25 mg daily  - Continue PTA Clonidine 0.1 mg BID     #Moderate Hypokalemia, asymptomatic  Unclear etiology, decreased intake versus medication related. Resolved after initial repletion but then low again on day of discharge. Orally repleted again, but will send home with 10 days of low dose oral replacement. He is on Losartan and now spironolactone which can hold onto potassium so not giving many days and discussed with patient the importance of following up with his PCP in the next week.  - Start 20mEq potassium chloride daily x10 days  - BMP at PCP visit to determine if can stop supplement and further evaluate     #Thrombocytopenia, mild  Unclear etiology, possible drug induced from tenofovir but patient does not recall ever taking this medication. Initially decreased to 83 but then improved to closer to baseline in 90s. Plan for further evaluation with PCP since not symptomatic.   - CBC at PCP visit     #Chronic Hepatitis B   During med rec, patient indicated he has never taken tenofovir even though it is on his PTA med list for hepatitis B. It appears he was followed at [facility] but unable to see GI notes. Discussed with pharmacy and since the patient does not have insurance, this medication would be highly expensive out-of-pocket. Since he has not started it and has follow up scheduled with [facility] later this month, plan to not discharge him on tenofovir and have this be assessed at follow-up appointment. If they do want to start him on this, he will need assistance applying for insurance or other waivers to help pay for this  medication.  - [facility] at 13:30   - Discuss if should start tenofovir   - Will need assistance with obtaining insurance if starting medication     #Hx CVA without residual deficits  - Continue PTA ASA 81 mg po daily  - Continue PTA atorvastatin 20 mg daily    Consultations This Hospital Stay   CARDIOLOGY ELECTROPHYSIOLOGY (EP) IP CONSULT  PHYSICAL THERAPY ADULT IP CONSULT  OCCUPATIONAL THERAPY ADULT IP CONSULT  NURSING TO CONSULT FOR VIRTUAL CARE IP    Code Status   Full Code       The patient was discussed with Dr. Vu.    MD Roldan Dos Santos 1 Medicine Service  [facility] MED SURG  [address]  [place] [address]  Phone: [phone]  ______________________________________________________________________    Physical Exam   Vital Signs: Temp: 98  F (36.7  C) Temp src: Oral BP: 138/88 Pulse: 69   Resp: 18 SpO2: 99 % O2 Device: None (Room air)    Weight: 142 lbs 6.67 oz  General Appearance: No acute distress, resting in bed  Respiratory: CTAB, no wheezes nor crackles  Cardiovascular: RRR, no murmurs appreciated, no LE edema  GI: normal BS, soft, non-distended  Skin: no rashes, lesions, or other abnormal findings on exposed skin  Neuro: alert and oriented x4, no focal deficits, CN grossly intact      Primary Care Physician   Michele Saldana    Discharge Orders      Adult Cardiology Eval  Referral      Adult GI  Referral - Consult Only      Primary Care Referral      Physical Therapy  Referral      Reason for your hospital stay    You were in the hospital for workup after you passed out and hit your head. We believe this happened because your heart rate was slower from medications, you might have been dehydrated, and your body reacted in a way called vasovagal syncope where your body lowers your blood pressure and heart rate based on a certain trigger. The symptoms you had before you fainted are similar to what  happens with vasovagal syncope.     Because your potassium has been lower, we are going to have you start a potassium supplement to take once every day. Your primary care provider will follow up on this.    Your potassium was also a little low so we gave you some extra when you were first admitted.     You were seen by electrophysiology cardiologists while admitted. They did not want to do any procedures inpatient but will see you in the clinic.     Activity    Your activity upon discharge: activity as tolerated     Adult [facility] Follow-up and recommended labs and tests    Follow up with primary care provider, Michele Saldana, within 7 days for hospital follow- up. Please check a BMP.    Follow up with Dr. Monreal (electrophysiology cardiology) in clinic (video visit) with first available appointment to evaluate syncopal symptoms.     Follow up with hepatology at next available appointment to follow up on your hepatitis B and ensure you are on the proper medication. You have an appointment scheduled for 10/24 at 1:30PM. You can call [phone] to schedule a follow up appointment if this does not work for you.     Appointments on [place] and/or [facility] (with [facility] or [facility] provider or service). Call [phone] if you haven't heard regarding these appointments within 7 days of discharge.     Diet    Follow this diet upon discharge: Current Diet:Orders Placed This Encounter      Regular Diet Adult       Significant Results and Procedures   Recent Results (from the past 24 hour(s))   CBC with platelets    Collection Time: 10/03/24  7:06 AM   Result Value Ref Range    WBC Count 4.0 4.0 - 11.0 10e3/uL    RBC Count 5.08 4.40 - 5.90 10e6/uL    Hemoglobin 14.6 13.3 - 17.7 g/dL    Hematocrit 42.9 40.0 - 53.0 %    MCV 84 78 - 100 fL    MCH 28.7 26.5 - 33.0 pg    MCHC 34.0 31.5 - 36.5 g/dL    RDW 12.9 10.0 - 15.0 %    Platelet Count 93 (L) 150 - 450 10e3/uL   Basic metabolic panel    Collection Time: 10/03/24   7:06 AM   Result Value Ref Range    Sodium 137 135 - 145 mmol/L    Potassium 3.1 (L) 3.4 - 5.3 mmol/L    Chloride 106 98 - 107 mmol/L    Carbon Dioxide (CO2) 23 22 - 29 mmol/L    Anion Gap 8 7 - 15 mmol/L    Urea Nitrogen 14.3 8.0 - 23.0 mg/dL    Creatinine 1.18 (H) 0.67 - 1.17 mg/dL    GFR Estimate 68 >60 mL/min/1.73m2    Calcium 8.9 8.8 - 10.4 mg/dL    Glucose 87 70 - 99 mg/dL     Results for orders placed or performed during the hospital encounter of 10/01/24   Head CT w/o contrast    Narrative    EXAM: CT HEAD W/O CONTRAST  10/1/2024 6:27 PM     HISTORY: syncope, fall, eval for bleed       COMPARISON: MRI brain 10/1/2022    TECHNIQUE: Using multidetector thin collimation helical acquisition  technique, axial, coronal and sagittal CT images from the skull base  to the vertex were obtained without intravenous contrast.   (topogram) image(s) also obtained and reviewed. Dose reduction  techniques were used.    FINDINGS:  No acute intracranial hemorrhage, mass effect, or midline shift. No  acute loss of gray-white matter differentiation in the cerebral  hemispheres. Ventricles are proportionate to the cerebral sulci. Clear  basal cisterns. Moderate scattered periventricular hypoattenuating  foci, as can be seen in chronic small vessel ischemic disease.  Multiple small chronic infarcts within bilateral centrum semiovale  bilaterally, left coronal radiata, bilateral thalami.     The bony calvaria and the bones of the skull base are normal. Mucosal  thickening at the base of the bilateral maxillary sinuses otherwise.  Mucous retention cysts in the maxillary sinus. Mastoid air cells are  clear. Grossly normal orbits.      Impression    IMPRESSION:   1. No acute intracranial pathology.  2. Multiple small chronic infarcts within bilateral centrum semiovale  bilaterally, left coronal radiata, and bilateral thalami.   .    I have personally reviewed the examination and initial interpretation  and I agree with the  findings.    ANITA HERRON MD         SYSTEM ID:  [id number]   CT Cervical Spine w/o Contrast    Narrative    EXAM: CT CERVICAL SPINE W/O CONTRAST  10/1/2024 6:28 PM     HISTORY:  syncope, fall, pain, eval for injury; Neck pain; Trauma;  Mild/moderate trauma; None of the following: Spondyloarthropathy,  cervical x-ray with negative result, questionable finding, or  inadequate coverage       COMPARISON:  Same day CT head without    TECHNIQUE: Using multidetector thin collimation helical acquisition  technique, axial, coronal and sagittal CT images through the cervical  spine were obtained without intravenous contrast.    FINDINGS:  Straightening of the cervical lordosis. No acute fracture or traumatic  subluxation. Mild intervertebral disc height loss, most prominently at  C4-C5. No prevertebral edema. Presumed chronic C6 vertebral height  loss.  Grade 1 anterolisthesis at C7-T2. Corticated radiolucent line along  the T1 spinous process, may represent nonunion versus chronic  fracture.    The findings on a level by level basis are as follows:    C2-3:  No spinal canal or neural foraminal stenosis.    C3-4: Disc osteophyte complex with severe left and mild right neural  foraminal narrowing. No spinal canal narrowing.     C4-5: Disc osteophyte complex, left greater than right. Bilateral  severe neural foraminal narrowing. No spinal canal narrowing.    C5-6:  Uncovertebral spurring. Mild left neural foraminal stenosis. No  spinal canal or right neural foraminal stenosis.    C6-7:  No spinal canal or neural foraminal stenosis.    C7-T1:  No spinal canal or neural foraminal stenosis.     No abnormality of the paraspinous soft tissues.      Impression    IMPRESSION:  1. No acute fracture or traumatic subluxation.  2. Multilevel cervical degenerative changes with severe left C3-4 and  bilateral C4-5 neural foraminal. No high grade spinal canal narrowing.    I have personally reviewed the examination and initial  interpretation  and I agree with the findings.    ANITA HERRON MD         SYSTEM ID:  [id number]   Echo Complete     Value    LVEF  60-65%    Narrative    [id number]  [id number]  [id number]  [id number]^VIVIANA^JOE^KATY     [facility]  Echocardiography Laboratory  [address] [place] [id number]     Name: GERA MOHR  MRN: [id number]  : 1960  Study Date: 10/02/2024 10:44 AM  Age: 64 yrs  Gender: Male  Patient Location: [facility]  Reason For Study: Syncope  Ordering Physician: JOE MICHELLE  Performed By: Alda Kulkarni RDCS     BSA: 1.7 m2  Height: 66 in  Weight: 142 lb  ______________________________________________________________________________  Procedure  Complete Portable Echo Adult.  ______________________________________________________________________________  Interpretation Summary  Global and regional left ventricular function is normal with an EF of 60-65%.  Right ventricular function, chamber size, wall motion, and thickness are  normal.  Pulmonary artery systolic pressure cannot be assessed.  The inferior vena cava is normal.  No pericardial effusion is present.  ______________________________________________________________________________  Left Ventricle  Global and regional left ventricular function is normal with an EF of 60-65%.  Left ventricular size is normal. Relative wall thickness is increased  consistent with concentric remodeling. Grade I or early diastolic dysfunction.  No regional wall motion abnormalities are seen.     Right Ventricle  Right ventricular function, chamber size, wall motion, and thickness are  normal.     Atria  Both atria appear normal. The atrial septum is intact as assessed by color  Doppler .     Mitral Valve  The mitral valve is normal.     Aortic Valve  Aortic valve is normal in structure and function.     Tricuspid Valve  The tricuspid valve is normal. Trace tricuspid insufficiency is present.  Pulmonary artery  systolic pressure cannot be assessed.     Pulmonic Valve  The valve leaflets are not well visualized. Trace pulmonic insufficiency is  present.     Vessels  The pulmonary artery cannot be assessed. The inferior vena cava is normal.  Sinuses of Valsalva 3.7 cm. Aortic root dilatation is present.     Pericardium  No pericardial effusion is present.  ______________________________________________________________________________  MMode/2D Measurements & Calculations  IVSd: 1.0 cm     LVIDd: 4.2 cm  LVIDs: 2.8 cm  LVPWd: 1.2 cm  FS: 33.2 %  LV mass(C)d: 153.2 grams  LV mass(C)dI: 88.6 grams/m2  Ao root diam: 3.7 cm  asc Aorta Diam: 2.9 cm  LVOT diam: 2.2 cm  LVOT area: 3.8 cm2  Ao root diam index Ht(cm/m): 2.2  Ao root diam index BSA (cm/m2): 2.1  Asc Ao diam index BSA (cm/m2): 1.7  Asc Ao diam index Ht(cm/m): 1.7  LA Volume (BP): 38.3 ml     LA Volume Index (BP): 22.1 ml/m2  RWT: 0.55     Doppler Measurements & Calculations  MV E max hugh: 50.7 cm/sec  MV A max hugh: 79.8 cm/sec  MV E/A: 0.64  MV dec time: 0.21 sec  E/E' av.0  Lateral E/e': 5.7  Medial E/e': 10.3  RV S Hugh: 11.9 cm/sec     ______________________________________________________________________________  Report approved by: Deon Lamas 10/02/2024 11:15 AM             Discharge Medications   Discharge Medication List as of 10/3/2024  4:11 PM        START taking these medications    Details   potassium chloride ER (K-TAB) 20 MEQ CR tablet Take 1 tablet (20 mEq) by mouth daily., Disp-10 tablet, R-0, E-Prescribe      spironolactone (ALDACTONE) 25 MG tablet Take 1 tablet (25 mg) by mouth daily., Disp-30 tablet, R-0, E-Prescribe           CONTINUE these medications which have CHANGED    Details   tenofovir (VIREAD) 300 MG tablet Take 1 tablet (300 mg) by mouth daily., Disp-90 tablet, R-3, E-Prescribe           CONTINUE these medications which have NOT CHANGED    Details   amLODIPine (NORVASC) 10 MG tablet Take 1 tablet (10 mg) by mouth daily, Disp-90  tablet, R-3, E-Prescribe      atorvastatin (LIPITOR) 20 MG tablet Take 1 tablet (20 mg) by mouth every evening, Disp-90 tablet, R-3, E-Prescribe      cloNIDine (CATAPRES) 0.1 MG tablet Take 1 tablet (0.1 mg) by mouth 2 times daily, Disp-180 tablet, R-3, E-Prescribe      losartan (COZAAR) 100 MG tablet Take 1 tablet (100 mg) by mouth daily, Disp-90 tablet, R-3, E-Prescribe           STOP taking these medications       aspirin (ASA) 81 MG EC tablet Comments:   Reason for Stopping:         carvedilol (COREG) 12.5 MG tablet Comments:   Reason for Stopping:         docusate sodium (COLACE) 100 MG capsule Comments:   Reason for Stopping:         Multiple Vitamins-Minerals (ONE-A-DAY MENS 50+ PO) Comments:   Reason for Stopping:         psyllium (METAMUCIL/KONSYL) capsule Comments:   Reason for Stopping:             Allergies   Allergies   Allergen Reactions    Chocolate     Nuts     Orange Fruit [Citrus] Cough

## 2024-10-03 NOTE — PLAN OF CARE
Goal Outcome Evaluation: [facility] [id number]. Pt A&Ox4, VSS on RA, and able to make needs known. Denies pain and n/v. Voiding spontaneously. Up independently. No acute events this shift. Continue with plan of care.       Plan of Care Reviewed With: patient    Overall Patient Progress: no changeOverall Patient Progress: no change

## 2024-10-03 NOTE — PLAN OF CARE
Assumed cares [id number]. A&Ox4. VSS on RA. Tele NSR. Denied pain. Up SBA. No syncopal episodes or dizziness. Reg diet, good appetite. L PIV saline locked.     Goal Outcome Evaluation:      Plan of Care Reviewed With: patient    Overall Patient Progress: no change       Discharge to: Home  Transportation: Taxi   Time: 1700  Prescriptions: Sent to discharge pharmacy and picked up by pt  Belongings: Sent w/ pt    -if applicable return home meds from inpatient pharmacy: N/A  Access: L PIV removed  Care plan and education discontinued: Yes  Paperwork:  Reviewed w/ pt by [facility]

## 2024-10-03 NOTE — PROGRESS NOTES
"   10/03/24 1001   Appointment Info   Signing Clinician's Name / Credentials (PT) Christina Martins PT, DPT   Rehab Comments (PT) monitor HR, hx syncope   Living Environment   People in Home spouse   Current Living Arrangements apartment   Home Accessibility stairs to enter home   Number of Stairs, Main Entrance 2   Stair Railings, Main Entrance railings on both sides of stairs;railings safe and in good condition   Transportation Anticipated family or friend will provide   Living Environment Comments pt lives with spouse in 2nd floor apartment. has elevator access but typically uses stairs.   Self-Care   Usual Activity Tolerance good   Current Activity Tolerance moderate   Regular Exercise No   Equipment Currently Used at Home none   Fall history within last six months yes   Number of times patient has fallen within last six months 1   Activity/Exercise/Self-Care Comment Pt reports generally in good health. 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 IND with all mobility and ADLs. reports only concerns have been falls 2/2 \"passing out\" ptrior to this admission and 8 months ago. Denies dizziness spells or balance concerns between these times   General Information   Onset of Illness/Injury or Date of Surgery 10/01/24   Referring Physician Alan Mckeon MD   Patient/Family Therapy Goals Statement (PT) return home   Pertinent History of Current Problem (include personal factors and/or comorbidities that impact the POC) Geronimo Coats is a 64 year old male with a past medical history of syncope and collapse, chronic insomnia, CKD stage 2, hepatitis B, CVA, hypertension, and thrombocytopenia, who was brought to the ED via EMS following an episode of LOC while standing in his kitchen at home, found to have hypokalemia and bradycardia with first-degree heart block, concerning for vasovagal v. cardiogenic syncope.\"   Existing Precautions/Restrictions fall   General Observations activity: ambulate with assist   Cognition " "  Affect/Mental Status (Cognition) WFL;flat/blunted affect   Pain Assessment   Patient Currently in Pain No   Integumentary/Edema   Integumentary/Edema no deficits were identifed   Posture    Posture Protracted shoulders   Range of Motion (ROM)   Range of Motion ROM is WFL   Strength (Manual Muscle Testing)   Strength (Manual Muscle Testing) strength is WFL   Strength Comments demonstrates 4-5/5 MMT B LEs, antigravity strength B UE. denies feeling \"weak\"   Bed Mobility   Comment, (Bed Mobility) ind   Transfers   Comment, (Transfers) ind   Gait/Stairs (Locomotion)   Comment, (Gait/Stairs) SBA secondary to fall risk, no overt LOB   Balance   Balance Comments mild unsteadiness with dynamic gait   Sensory Examination   Sensory Perception patient reports no sensory changes   Coordination   Coordination no deficits were identified   Muscle Tone   Muscle Tone Comments appropriate mm tone   Clinical Impression   Criteria for Skilled Therapeutic Intervention Yes, treatment indicated   PT Diagnosis (PT) impaired functional mobility   Influenced by the following impairments impaired activty tolerance, impaired balance   Functional limitations due to impairments gait, stairs, fall prevention   Clinical Presentation (PT Evaluation Complexity) stable   Clinical Presentation Rationale clinical judgement   Clinical Decision Making (Complexity) low complexity   Planned Therapy Interventions (PT) balance training;bed mobility training;gait training;home exercise program;motor coordination training;neuromuscular re-education;patient/family education;ROM (range of motion);strengthening;stair training;stretching;progressive activity/exercise;transfer training;risk factor education;home program guidelines   Risk & Benefits of therapy have been explained evaluation/treatment results reviewed;care plan/treatment goals reviewed;risks/benefits reviewed;current/potential barriers reviewed;participants voiced agreement with care " plan;participants included;patient   PT Total Evaluation Time   PT Eval, Low Complexity Minutes (87672) 9   Physical Therapy Goals   PT Frequency 4x/week   PT Predicted Duration/Target Date for Goal Attainment 10/31/24   PT Goals Gait;Stairs   PT: Gait Independent;Greater than 200 feet   PT: Stairs Modified independent;Greater than 10 stairs;Rail on both sides   PT Discharge Planning   PT Plan gait, stairs, fall prevention strategies   PT Discharge Recommendation (DC Rec) home with assist;home with outpatient physical therapy   PT Rationale for DC Rec Pt mobilizing near baseline, was able to ambulate household distances SBA w/o dizziness or fall concerns. Safety limited by unpredictable syncope, anticipate with medical stability will be safe to dc home. Rec OP PT for general conditioning.   PT Brief overview of current status SBA   Total Session Time   Total Session Time (sum of timed and untimed services) 9

## 2024-10-03 NOTE — PROGRESS NOTES
Occupational Therapy: Orders received. Chart reviewed and discussed with physical therapy.? Occupational Therapy not indicated due to limited inpatient occupational therapy needs. PT following after a syncopal episode/fall. Anticipate short stay with assist available at home at time of discharge.? Defer discharge recommendations to PT and medical team.? Will complete orders.

## 2024-10-04 ENCOUNTER — PATIENT OUTREACH (OUTPATIENT)
Dept: FAMILY MEDICINE | Facility: CLINIC | Age: 64
End: 2024-10-04

## 2024-10-04 NOTE — TELEPHONE ENCOUNTER
"ED / Discharge Outreach Protocol    Patient Contact    Attempt # 1    Was call answered?  No.  Unable to leave message. VM box not set up. Will have to retry.    Per hospital:  \"  Follow-up Appointments     Adult [facility] Follow-up and recommended labs and tests      Follow up with primary care provider, Michele Saldana, within 7 days for   hospital follow- up. Please check a BMP.  \"    Kavitha Talavera RN   "

## 2024-10-07 ENCOUNTER — TELEPHONE (OUTPATIENT)
Dept: CARDIOLOGY | Facility: CLINIC | Age: 64
End: 2024-10-07

## 2024-10-07 NOTE — TELEPHONE ENCOUNTER
ED / Discharge Outreach Protocol     Patient Contact     Attempt # 2     Was call answered?  No.  Unable to leave message. VM box not set up. Will have to retry.

## 2024-10-07 NOTE — TELEPHONE ENCOUNTER
----- Message from Reinier Lima sent at 10/4/2024  5:53 PM CDT -----  Pls arrange virtual visit with Dr. Monreal.   Thanks!  LVW

## 2024-10-17 DIAGNOSIS — B16.1 ACUTE HEPATITIS B WITH DELTA-AGENT WITHOUT HEPATIC COMA: Primary | ICD-10-CM

## 2024-10-17 RX ORDER — TENOFOVIR DISOPROXIL FUMARATE 300 MG/1
300 TABLET, FILM COATED ORAL DAILY
Qty: 90 TABLET | Refills: 3 | Status: SHIPPED | OUTPATIENT
Start: 2024-10-17

## 2024-10-18 DIAGNOSIS — E87.6 HYPOKALEMIA: ICD-10-CM

## 2024-10-18 DIAGNOSIS — I10 PRIMARY HYPERTENSION: Chronic | ICD-10-CM

## 2024-10-18 DIAGNOSIS — E26.9 HYPERALDOSTERONISM (H): ICD-10-CM

## 2024-10-18 RX ORDER — POTASSIUM CHLORIDE 1500 MG/1
20 TABLET, EXTENDED RELEASE ORAL DAILY
Qty: 10 TABLET | Refills: 0 | Status: SHIPPED | OUTPATIENT
Start: 2024-10-18

## 2024-10-18 RX ORDER — SPIRONOLACTONE 25 MG/1
25 TABLET ORAL DAILY
Qty: 30 TABLET | Refills: 0 | Status: SHIPPED | OUTPATIENT
Start: 2024-10-18

## 2024-11-11 ENCOUNTER — TELEPHONE (OUTPATIENT)
Dept: FAMILY MEDICINE | Facility: CLINIC | Age: 64
End: 2024-11-11

## 2024-11-11 DIAGNOSIS — Z59.71 DOES NOT HAVE HEALTH INSURANCE: ICD-10-CM

## 2024-11-11 DIAGNOSIS — E26.9 HYPERALDOSTERONISM (H): ICD-10-CM

## 2024-11-11 DIAGNOSIS — I10 PRIMARY HYPERTENSION: Primary | ICD-10-CM

## 2024-11-11 DIAGNOSIS — I63.50 RIGHT PONTINE STROKE (H): ICD-10-CM

## 2024-11-11 NOTE — TELEPHONE ENCOUNTER
Geronimo Coats was identified as being non-adherent to his/her(medication name) in the last 30-90 days.  Reason(s) identified for non-adherence: cost is too high on   Intervention(s) offered to assist patient with adherence:  Provided patient education regarding    Recommendation to Provider: referral to ?   Recommended follow-up: patient does not have insurance, he has not picked up his medications since    Completed by:Melani Johnson [facility]  [address] [place] [address]  [place] [address]  [phone]

## 2024-11-15 ENCOUNTER — PATIENT OUTREACH (OUTPATIENT)
Dept: CARE COORDINATION | Facility: CLINIC | Age: 64
End: 2024-11-15

## 2024-11-15 NOTE — PROGRESS NOTES
Clinic Care Coordination Contact  [facility]/Voicemail    Clinical Data: Care Coordinator Outreach    Outreach Documentation Number of Outreach Attempt   11/15/2024  10:43 AM 1       Unable to leave a message due to: Voicemail is not set up.      Plan: Care Coordinator will send care coordination introduction letter with care coordinator contact information and explanation of care coordination services via mail. Care Coordinator will try to reach patient again in 1-2 business days.    Júnior Jean MSN, RN, PHN, [facility]   Primary Care Clinical RN Care Coordinator  [facility]  11/15/2024   10:46 AM  [email or web address]  Office: [phone]

## 2024-11-15 NOTE — LETTER
[facility] CARE COORDINATION  [facility]   [address]  [place] [address]  [phone]    November 15, 2024    Geronimo Coats  [address]  [place] [address]      Dear LIS Galarza,    I am a clinic care coordinator who works with Michele Saldana MD with the [facility]. I wanted to introduce myself and provide you with my contact information for you to be able to call me with any questions or concerns. Below is a description of clinic care coordination and how I can further assist you.       The clinic care coordination team is made up of a registered nurse, , financial resource worker and community health worker who understand the health care system. The goal of clinic care coordination is to help you manage your health and improve access to the health care system. Our team works alongside your provider to assist you in determining your health and social needs. We can help you obtain health care and community resources, providing you with necessary information and education. We can work with you through any barriers and develop a care plan that helps coordinate and strengthen the communication between you and your care team.  Our services are voluntary and are offered without charge to you personally.    Please feel free to contact me with any questions or concerns regarding care coordination and what we can offer.      We are focused on providing you with the highest-quality healthcare experience possible.    Sincerely,     Júnior Jean MSN, RN, PHN, [facility]   Primary Care Clinical RN Care Coordinator  [facility]  11/15/2024   10:45 AM  [email or web address]  Office: [phone]

## 2024-11-18 ENCOUNTER — PATIENT OUTREACH (OUTPATIENT)
Dept: CARE COORDINATION | Facility: CLINIC | Age: 64
End: 2024-11-18

## 2024-11-18 NOTE — LETTER
[facility] CARE COORDINATION  [facility]   [address]  [place] [address]  [phone]    November 18, 2024    Geronimo Coats  [address]  [place] [address]      Dear LIS Galarza,    I am a clinic care coordinator who works with Michele Saldana MD with the [facility]. I wanted to introduce myself and provide you with my contact information for you to be able to call me with any questions or concerns. I wanted to thank you for spending the time to talk with me.  Below is a description of clinic care coordination and how I can further assist you.       The clinic care coordination team is made up of a registered nurse, , financial resource worker and community health worker who understand the health care system. The goal of clinic care coordination is to help you manage your health and improve access to the health care system. Our team works alongside your provider to assist you in determining your health and social needs. We can help you obtain health care and community resources, providing you with necessary information and education. We can work with you through any barriers and develop a care plan that helps coordinate and strengthen the communication between you and your care team.  Our services are voluntary and are offered without charge to you personally.    Please feel free to contact me with any questions or concerns regarding care coordination and what we can offer.      We are focused on providing you with the highest-quality healthcare experience possible.    Sincerely,     Júnior Jean MSN, RN, PHN, [facility]   Primary Care Clinical RN Care Coordinator  [facility]  11/18/2024   2:29 PM  [email or web address]  Office: [phone]

## 2024-11-18 NOTE — PROGRESS NOTES
Clinic Care Coordination Contact    Situation: Patient chart reviewed by care coordinator.    Background: The patient currently has only Medicare part A which only covers hospitalization.  He does not have part B or a supplement, or part D which is medications. The RN CC encouraged him to contact the [facility] departments at [phone].  That will give us an idea if he qualifies for any assistance at all.      Assessment: The patient can not afford his medications or trips to the doctor due to not having any Medicare to cover those areas.  The RN CC explained the importance of filling out the paperwork at the [facility] for assistance.      Plan/Recommendations: The patient will call the [facility] Department to see if he would qualify for any assistance or medical help.    Júnior Jean MSN, RN, PHN, CCM   Primary Care Clinical RN Care Coordinator  [facility]  11/18/2024   2:45 PM  [email or web address]  Office: [phone]

## 2024-11-19 ENCOUNTER — OFFICE VISIT (OUTPATIENT)
Dept: FAMILY MEDICINE | Facility: CLINIC | Age: 64
End: 2024-11-19

## 2024-11-19 VITALS
DIASTOLIC BLOOD PRESSURE: 80 MMHG | BODY MASS INDEX: 25.25 KG/M2 | WEIGHT: 157.13 LBS | TEMPERATURE: 97.8 F | HEIGHT: 66 IN | OXYGEN SATURATION: 98 % | RESPIRATION RATE: 16 BRPM | SYSTOLIC BLOOD PRESSURE: 126 MMHG | HEART RATE: 74 BPM

## 2024-11-19 DIAGNOSIS — E78.5 HYPERLIPIDEMIA LDL GOAL <100: ICD-10-CM

## 2024-11-19 DIAGNOSIS — Z12.11 SCREEN FOR COLON CANCER: ICD-10-CM

## 2024-11-19 DIAGNOSIS — Z12.5 SCREENING FOR PROSTATE CANCER: ICD-10-CM

## 2024-11-19 DIAGNOSIS — R73.01 IMPAIRED FASTING GLUCOSE: ICD-10-CM

## 2024-11-19 DIAGNOSIS — R53.83 FATIGUE, UNSPECIFIED TYPE: ICD-10-CM

## 2024-11-19 DIAGNOSIS — Z00.00 ENCOUNTER FOR MEDICARE ANNUAL WELLNESS EXAM: Primary | ICD-10-CM

## 2024-11-19 DIAGNOSIS — K21.9 GASTROESOPHAGEAL REFLUX DISEASE, UNSPECIFIED WHETHER ESOPHAGITIS PRESENT: ICD-10-CM

## 2024-11-19 DIAGNOSIS — B18.1 CHRONIC VIRAL HEPATITIS B WITHOUT DELTA AGENT AND WITHOUT COMA (H): ICD-10-CM

## 2024-11-19 DIAGNOSIS — K40.90 LEFT INGUINAL HERNIA: ICD-10-CM

## 2024-11-19 LAB
ALBUMIN SERPL BCG-MCNC: 4.2 G/DL (ref 3.5–5.2)
ALP SERPL-CCNC: 93 U/L (ref 40–150)
ALT SERPL W P-5'-P-CCNC: 33 U/L (ref 0–70)
ANION GAP SERPL CALCULATED.3IONS-SCNC: 11 MMOL/L (ref 7–15)
AST SERPL W P-5'-P-CCNC: 42 U/L (ref 0–45)
BASOPHILS # BLD AUTO: 0 10E3/UL (ref 0–0.2)
BASOPHILS NFR BLD AUTO: 0 %
BILIRUB SERPL-MCNC: 0.7 MG/DL
BUN SERPL-MCNC: 19.4 MG/DL (ref 8–23)
CALCIUM SERPL-MCNC: 10.1 MG/DL (ref 8.8–10.4)
CHLORIDE SERPL-SCNC: 106 MMOL/L (ref 98–107)
CHOLEST SERPL-MCNC: 95 MG/DL
CREAT SERPL-MCNC: 1.33 MG/DL (ref 0.67–1.17)
EGFRCR SERPLBLD CKD-EPI 2021: 59 ML/MIN/1.73M2
EOSINOPHIL # BLD AUTO: 0 10E3/UL (ref 0–0.7)
EOSINOPHIL NFR BLD AUTO: 1 %
ERYTHROCYTE [DISTWIDTH] IN BLOOD BY AUTOMATED COUNT: 13.3 % (ref 10–15)
EST. AVERAGE GLUCOSE BLD GHB EST-MCNC: 111 MG/DL
FASTING STATUS PATIENT QL REPORTED: NO
FASTING STATUS PATIENT QL REPORTED: NO
GLUCOSE SERPL-MCNC: 87 MG/DL (ref 70–99)
HBA1C MFR BLD: 5.5 % (ref 0–5.6)
HCO3 SERPL-SCNC: 26 MMOL/L (ref 22–29)
HCT VFR BLD AUTO: 44.2 % (ref 40–53)
HDLC SERPL-MCNC: 48 MG/DL
HGB BLD-MCNC: 15.1 G/DL (ref 13.3–17.7)
IMM GRANULOCYTES # BLD: 0 10E3/UL
IMM GRANULOCYTES NFR BLD: 0 %
LDLC SERPL CALC-MCNC: 37 MG/DL
LYMPHOCYTES # BLD AUTO: 1.4 10E3/UL (ref 0.8–5.3)
LYMPHOCYTES NFR BLD AUTO: 44 %
MCH RBC QN AUTO: 28.9 PG (ref 26.5–33)
MCHC RBC AUTO-ENTMCNC: 34.2 G/DL (ref 31.5–36.5)
MCV RBC AUTO: 85 FL (ref 78–100)
MONOCYTES # BLD AUTO: 0.3 10E3/UL (ref 0–1.3)
MONOCYTES NFR BLD AUTO: 11 %
NEUTROPHILS # BLD AUTO: 1.3 10E3/UL (ref 1.6–8.3)
NEUTROPHILS NFR BLD AUTO: 43 %
NONHDLC SERPL-MCNC: 47 MG/DL
PLATELET # BLD AUTO: 104 10E3/UL (ref 150–450)
POTASSIUM SERPL-SCNC: 3.7 MMOL/L (ref 3.4–5.3)
PROT SERPL-MCNC: 7.4 G/DL (ref 6.4–8.3)
PSA SERPL DL<=0.01 NG/ML-MCNC: 2.36 NG/ML (ref 0–4.5)
RBC # BLD AUTO: 5.22 10E6/UL (ref 4.4–5.9)
SODIUM SERPL-SCNC: 143 MMOL/L (ref 135–145)
TRIGL SERPL-MCNC: 51 MG/DL
TSH SERPL DL<=0.005 MIU/L-ACNC: 1.96 UIU/ML (ref 0.3–4.2)
WBC # BLD AUTO: 3.1 10E3/UL (ref 4–11)

## 2024-11-19 PROCEDURE — 85025 COMPLETE CBC W/AUTO DIFF WBC: CPT | Performed by: FAMILY MEDICINE

## 2024-11-19 PROCEDURE — 36415 COLL VENOUS BLD VENIPUNCTURE: CPT | Performed by: FAMILY MEDICINE

## 2024-11-19 PROCEDURE — G0438 PPPS, INITIAL VISIT: HCPCS | Performed by: FAMILY MEDICINE

## 2024-11-19 PROCEDURE — 80061 LIPID PANEL: CPT | Performed by: FAMILY MEDICINE

## 2024-11-19 PROCEDURE — G0103 PSA SCREENING: HCPCS | Performed by: FAMILY MEDICINE

## 2024-11-19 PROCEDURE — 99213 OFFICE O/P EST LOW 20 MIN: CPT | Mod: 25 | Performed by: FAMILY MEDICINE

## 2024-11-19 PROCEDURE — 80053 COMPREHEN METABOLIC PANEL: CPT | Performed by: FAMILY MEDICINE

## 2024-11-19 PROCEDURE — 84443 ASSAY THYROID STIM HORMONE: CPT | Performed by: FAMILY MEDICINE

## 2024-11-19 PROCEDURE — 83036 HEMOGLOBIN GLYCOSYLATED A1C: CPT | Performed by: FAMILY MEDICINE

## 2024-11-19 RX ORDER — FAMOTIDINE 20 MG/1
20 TABLET, FILM COATED ORAL 2 TIMES DAILY PRN
Qty: 60 TABLET | Refills: 11 | Status: SHIPPED | OUTPATIENT
Start: 2024-11-19

## 2024-11-19 SDOH — HEALTH STABILITY: PHYSICAL HEALTH: ON AVERAGE, HOW MANY MINUTES DO YOU ENGAGE IN EXERCISE AT THIS LEVEL?: 0 MIN

## 2024-11-19 SDOH — HEALTH STABILITY: PHYSICAL HEALTH: ON AVERAGE, HOW MANY DAYS PER WEEK DO YOU ENGAGE IN MODERATE TO STRENUOUS EXERCISE (LIKE A BRISK WALK)?: 0 DAYS

## 2024-11-19 ASSESSMENT — PAIN SCALES - GENERAL: PAINLEVEL_OUTOF10: NO PAIN (0)

## 2024-11-19 ASSESSMENT — SOCIAL DETERMINANTS OF HEALTH (SDOH): HOW OFTEN DO YOU GET TOGETHER WITH FRIENDS OR RELATIVES?: ONCE A WEEK

## 2024-11-19 NOTE — LETTER
November 20, 2024      LIS Coats  [address]  [place] [address]        Dear Michell:    We are writing to inform you of your test results.    Kidney test (creatinine) a bit high, but overall stable.     The hemoglobin A1c is back to normal range (was prediabetes range last year).     White blood count and platelet count are low, but stable.     Other labs are fine.     Resulted Orders   Lipid panel reflex to direct LDL Non-fasting   Result Value Ref Range    Cholesterol 95 <200 mg/dL    Triglycerides 51 <150 mg/dL    Direct Measure HDL 48 >=40 mg/dL    LDL Cholesterol Calculated 37 <100 mg/dL    Non HDL Cholesterol 47 <130 mg/dL    Patient Fasting > 8hrs? No     Narrative    Cholesterol  Desirable: < 200 mg/dL  Borderline High: 200 - 239 mg/dL  High: >= 240 mg/dL    Triglycerides  Normal: < 150 mg/dL  Borderline High: 150 - 199 mg/dL  High: 200-499 mg/dL  Very High: >= 500 mg/dL    Direct Measure HDL  Female: >= 50 mg/dL   Male: >= 40 mg/dL    LDL Cholesterol  Desirable: < 100 mg/dL  Above Desirable: 100 - 129 mg/dL   Borderline High: 130 - 159 mg/dL   High:  160 - 189 mg/dL   Very High: >= 190 mg/dL    Non HDL Cholesterol  Desirable: < 130 mg/dL  Above Desirable: 130 - 159 mg/dL  Borderline High: 160 - 189 mg/dL  High: 190 - 219 mg/dL  Very High: >= 220 mg/dL   Comprehensive metabolic panel   Result Value Ref Range    Sodium 143 135 - 145 mmol/L    Potassium 3.7 3.4 - 5.3 mmol/L    Carbon Dioxide (CO2) 26 22 - 29 mmol/L    Anion Gap 11 7 - 15 mmol/L    Urea Nitrogen 19.4 8.0 - 23.0 mg/dL    Creatinine 1.33 (H) 0.67 - 1.17 mg/dL    GFR Estimate 59 (L) >60 mL/min/1.73m2      Comment:      eGFR calculated using 2021 CKD-EPI equation.    Calcium 10.1 8.8 - 10.4 mg/dL      Comment:      Reference intervals for this test were updated on 7/16/2024 to reflect our healthy population more accurately. There may be differences in the flagging of prior results with similar values performed with this  method. Those prior results can be interpreted in the context of the updated reference intervals.    Chloride 106 98 - 107 mmol/L    Glucose 87 70 - 99 mg/dL    Alkaline Phosphatase 93 40 - 150 U/L    AST 42 0 - 45 U/L    ALT 33 0 - 70 U/L    Protein Total 7.4 6.4 - 8.3 g/dL    Albumin 4.2 3.5 - 5.2 g/dL    Bilirubin Total 0.7 <=1.2 mg/dL    Patient Fasting > 8hrs? No    Hemoglobin A1c   Result Value Ref Range    Estimated Average Glucose 111 <117 mg/dL    Hemoglobin A1C 5.5 0.0 - 5.6 %      Comment:      Normal <5.7%   Prediabetes 5.7-6.4%    Diabetes 6.5% or higher     Note: Adopted from ADA consensus guidelines.   TSH with free T4 reflex   Result Value Ref Range    TSH 1.96 0.30 - 4.20 uIU/mL   Prostate Specific Antigen Screen   Result Value Ref Range    Prostate Specific Antigen Screen 2.36 0.00 - 4.50 ng/mL    Narrative    This result is obtained using the Roche Elecsys total PSA method on the carlos e801 immunoassay analyzer, which is an ultrasensitive method. Results obtained with different assay methods or kits cannot be used interchangeably.  This test is intended for initial prostate cancer screening. PSA values exceeding the age-specific limits are suspicious for prostate disease, but additional testing, such as prostate biopsy, is needed to diagnose prostate pathology. The American Cancer Society recommends annual examination with digital rectal examination and serum PSA beginning at age 50 and for men with a life expectancy of at least 10 years after detection of prostate cancer. For men in high-risk groups, such as  Americans or men with a first-degree relative diagnosed at a younger age, testing should begin at a younger age. It is generally recommended that information be provided to patients about the benefits and limitations of testing and treatment so they can make informed decisions.   CBC with platelets and differential   Result Value Ref Range    WBC Count 3.1 (L) 4.0 - 11.0 10e3/uL     RBC Count 5.22 4.40 - 5.90 10e6/uL    Hemoglobin 15.1 13.3 - 17.7 g/dL    Hematocrit 44.2 40.0 - 53.0 %    MCV 85 78 - 100 fL    MCH 28.9 26.5 - 33.0 pg    MCHC 34.2 31.5 - 36.5 g/dL    RDW 13.3 10.0 - 15.0 %    Platelet Count 104 (L) 150 - 450 10e3/uL    % Neutrophils 43 %    % Lymphocytes 44 %    % Monocytes 11 %    % Eosinophils 1 %    % Basophils 0 %    % Immature Granulocytes 0 %    Absolute Neutrophils 1.3 (L) 1.6 - 8.3 10e3/uL    Absolute Lymphocytes 1.4 0.8 - 5.3 10e3/uL    Absolute Monocytes 0.3 0.0 - 1.3 10e3/uL    Absolute Eosinophils 0.0 0.0 - 0.7 10e3/uL    Absolute Basophils 0.0 0.0 - 0.2 10e3/uL    Absolute Immature Granulocytes 0.0 <=0.4 10e3/uL     If you have any questions or concerns, please call the clinic at the number listed above.     Sincerely,      Mike Gomez MD/[email or web address]

## 2024-11-19 NOTE — PROGRESS NOTES
Preventive Care Visit  [facility] SHAREE Gomez MD, Family Medicine  Nov 19, 2024          Kari Galarza is a 64 year old, presenting for the following:  Wellness Visit        11/19/2024    10:36 AM   Additional Questions   Roomed by Rochelle Flynn           HPI  Feeling  okay overall  Some left side abd pain, bump growing    From [place]    Kids healthy        Health Care Directive  Patient does not have a Health Care Directive: Discussed advance care planning with patient; however, patient declined at this time.      11/19/2024   General Health   How would you rate your overall physical health? Good   Feel stress (tense, anxious, or unable to sleep) Not at all            11/19/2024   Nutrition   Diet: Regular (no restrictions)            11/19/2024   Exercise   Days per week of moderate/strenous exercise 0 days   Average minutes spent exercising at this level 0 min      (!) EXERCISE CONCERN      11/19/2024   Social Factors   Frequency of gathering with friends or relatives Once a week   Worry food won't last until get money to buy more No   Food not last or not have enough money for food? No   Do you have housing? (Housing is defined as stable permanent housing and does not include staying ouside in a car, in a tent, in an abandoned building, in an overnight shelter, or couch-surfing.) Yes   Are you worried about losing your housing? No   Lack of transportation? No   Unable to get utilities (heat,electricity)? No            11/19/2024   Fall Risk   Fallen 2 or more times in the past year? No     No    Trouble with walking or balance? No     No        Patient-reported    Multiple values from one day are sorted in reverse-chronological order          11/19/2024   Activities of Daily Living- Home Safety   Needs help with the following daily activites None of the above   Safety concerns in the home None of the above            11/19/2024   Dental   Dentist two times every year? (!) NO             11/19/2024   Hearing Screening   Hearing concerns? None of the above            11/19/2024   Driving Risk Screening   Patient/family members have concerns about driving No            11/19/2024   General Alertness/Fatigue Screening   Have you been more tired than usual lately? No            11/19/2024   Urinary Incontinence Screening   Bothered by leaking urine in past 6 months No            11/19/2024   TB Screening   Were you born outside of the [place]? Yes            Today's PHQ-2 Score:       11/19/2024    10:42 AM   PHQ-2 ( 1999 [employer])   Q1: Little interest or pleasure in doing things 0    Q2: Feeling down, depressed or hopeless 0    PHQ-2 Score 0    Q1: Little interest or pleasure in doing things Not at all   Q2: Feeling down, depressed or hopeless Not at all   PHQ-2 Score 0       Patient-reported           11/19/2024   Substance Use   Alcohol more than 3/day or more than 7/wk No   Do you have a current opioid prescription? No   How severe/bad is pain from 1 to 10? 0/10 (No Pain)   Do you use any other substances recreationally? No        Social History     Tobacco Use    Smoking status: Never     Passive exposure: Never    Smokeless tobacco: Never   Vaping Use    Vaping status: Never Used   Substance Use Topics    Alcohol use: Yes     Comment: rarely 2-3 times per year    Drug use: Never           11/19/2024   AAA Screening   Family history of Abdominal Aortic Aneurysm (AAA)? No      Last PSA:   PSA   Date Value Ref Range Status   05/21/2019 1.42 0 - 4 ug/L Final     Comment:     Assay Method:  Chemiluminescence using Siemens Vista analyzer     Prostate Specific Antigen Screen   Date Value Ref Range Status   07/24/2023 3.50 0.00 - 4.50 ng/mL Final     ASCVD Risk   The ASCVD Risk score (Tavo MATTSON, et al., 2019) failed to calculate for the following reasons:    Risk score cannot be calculated because patient has a medical history suggesting prior/existing ASCVD            Reviewed and updated  "as needed this visit by Provider                      Current providers sharing in care for this patient include:  Patient Care Team:  Michele Saldana MD as PCP - General (Internal Medicine - Pediatrics)  Leventhal, Thomas Michael, MD as Hepatologist (Gastroenterology)  Kentrell Schmid MD as MD (Neurology)  Isabelle Etienne MD as MD (Endocrinology, Diabetes, and Metabolism)  Michele Saldana MD as Assigned PCP  Esteban Garnett MD as MD (Surgery)  Shannon Mcnair MD as Physician (Gastroenterology)  Esteban Garnett MD as Assigned Surgical Provider  Júnior Brody RN as Lead Care Coordinator (Primary Care - CC)    The following health maintenance items are reviewed in Epic and correct as of today:  Health Maintenance   Topic Date Due    URINALYSIS  Never done    HEPATITIS A IMMUNIZATION (1 of 2 - Risk 2-dose series) [birth year]/28/1977    ZOSTER IMMUNIZATION (1 of 2) Never done    RSV VACCINE (1 - Risk 60-74 years 1-dose series) Never done    MEDICARE ANNUAL WELLNESS VISIT  05/28/2023    HEPATITIS B IMMUNIZATION (2 of 3 - Risk 3-dose series) 08/22/2024    INFLUENZA VACCINE (1) 09/01/2024    COVID-19 Vaccine (5 - 2024-25 season) 09/01/2024    LIPID  11/16/2024    COLORECTAL CANCER SCREENING  11/16/2024    ADVANCE CARE PLANNING  12/06/2024    MICROALBUMIN  04/18/2025    ANNUAL REVIEW OF HM ORDERS  07/25/2025    BMP  10/03/2025    FALL RISK ASSESSMENT  11/19/2025    DTAP/TDAP/TD IMMUNIZATION (2 - Td or Tdap) 04/18/2026    GLUCOSE  10/03/2027    HEPATITIS C SCREENING  Completed    PHQ-2 (once per calendar year)  Completed    Pneumococcal Vaccine: 65+ Years  Completed    HPV IMMUNIZATION  Aged Out    MENINGITIS IMMUNIZATION  Aged Out    RSV MONOCLONAL ANTIBODY  Aged Out     Gets heartburn some    Uses tums       Objective    Exam  /80 (BP Location: Left arm, Patient Position: Chair, Cuff Size: Adult Regular)   Pulse 74   Temp 97.8  F (36.6  C) (Temporal)   Resp 16   Ht 1.676 m (5' 6\") " "  Wt 71.3 kg (157 lb 2 oz)   SpO2 98%   BMI 25.36 kg/m     Estimated body mass index is 25.36 kg/m  as calculated from the following:    Height as of this encounter: 1.676 m (5' 6\").    Weight as of this encounter: 71.3 kg (157 lb 2 oz).    Physical Exam  GENERAL: alert and no distress  EYES: Eyes grossly normal to inspection, PERRL and conjunctivae and sclerae normal  HENT: ear canals and TM's normal, nose and mouth without ulcers or lesions  NECK: no adenopathy, no asymmetry, masses, or scars  RESP: lungs clear to auscultation - no rales, rhonchi or wheezes  CV: regular rate and rhythm, normal S1 S2, no S3 or S4, no murmur, click or rub, no peripheral edema  ABDOMEN: tenderness in a large left inguinal hernia,  easily visible on exam, bowel sounds normal; remainder of abdomen okay   MS: no gross musculoskeletal defects noted, no edema  SKIN: no suspicious lesions or rashes  NEURO: Normal strength and tone, mentation intact and speech normal  PSYCH: mentation appears normal, affect normal/bright        11/19/2024   Mini Cog   Mini-Cog Not Completed (choose reason) Patient declines          Patient declines, there are NO concerns for cognitive deficits.          11/19/2024   Vision Screen   Reason Vision Screen Not Completed Screening Recommend: Patient/Guardian Declined          1. Encounter for Medicare annual wellness exam    2. Left inguinal hernia    3. Gastroesophageal reflux disease, unspecified whether esophagitis present    4. Hyperlipidemia LDL goal <100    5. Screening for prostate cancer    6. Impaired fasting glucose    7. Fatigue, unspecified type    8. Chronic viral hepatitis B without delta agent and without coma (H)    9. Screen for colon cancer      Discussed multiple issues with patient  Large left inguinal hernia; he will schedule with gen surg for consult  FIT test given  Check labs   Blood pressure controlled  Stay on meds  Sounds like patient did not  the tenofovir from pharmacy as " prescribed by specialist.  He was reminded to do so.  Trial of h2 blocker for gerd              Signed Electronically by: Mike Gomez MD

## 2024-11-19 NOTE — PATIENT INSTRUCTIONS
We will send you lab results    Be sure to take  the medication tenofovir ( sent in by specialist in October, take one daily for hepatitis B )    Continue other medications    Do the FIT stool test and return it here    Schedule with  general surgeon for consult for the hernia    Could use famotidine to help heartburn                  Patient Education   Preventive Care Advice   This is general advice given by our system to help you stay healthy. However, your care team may have specific advice just for you. Please talk to your care team about your preventive care needs.  Nutrition  Eat 5 or more servings of fruits and vegetables each day.  Try wheat bread, brown rice and whole grain pasta (instead of white bread, rice, and pasta).  Get enough calcium and vitamin D. Check the label on foods and aim for 100% of the RDA (recommended daily allowance).  Lifestyle  Exercise at least 150 minutes each week  (30 minutes a day, 5 days a week).  Do muscle strengthening activities 2 days a week. These help control your weight and prevent disease.  No smoking.  Wear sunscreen to prevent skin cancer.  Have a dental exam and cleaning every 6 months.  Yearly exams  See your health care team every year to talk about:  Any changes in your health.  Any medicines your care team has prescribed.  Preventive care, family planning, and ways to prevent chronic diseases.  Shots (vaccines)   HPV shots (up to age 26), if you've never had them before.  Hepatitis B shots (up to age 59), if you've never had them before.  COVID-19 shot: Get this shot when it's due.  Flu shot: Get a flu shot every year.  Tetanus shot: Get a tetanus shot every 10 years.  Pneumococcal, hepatitis A, and RSV shots: Ask your care team if you need these based on your risk.  Shingles shot (for age 50 and up)  General health tests  Diabetes screening:  Starting at age 35, Get screened for diabetes at least every 3 years.  If you are younger than age 35, ask your care  team if you should be screened for diabetes.  Cholesterol test: At age 39, start having a cholesterol test every 5 years, or more often if advised.  Bone density scan (DEXA): At age 50, ask your care team if you should have this scan for osteoporosis (brittle bones).  Hepatitis C: Get tested at least once in your life.  STIs (sexually transmitted infections)  Before age 24: Ask your care team if you should be screened for STIs.  After age 24: Get screened for STIs if you're at risk. You are at risk for STIs (including HIV) if:  You are sexually active with more than one person.  You don't use condoms every time.  You or a partner was diagnosed with a sexually transmitted infection.  If you are at risk for HIV, ask about PrEP medicine to prevent HIV.  Get tested for HIV at least once in your life, whether you are at risk for HIV or not.  Cancer screening tests  Cervical cancer screening: If you have a cervix, begin getting regular cervical cancer screening tests starting at age 21.  Breast cancer scan (mammogram): If you've ever had breasts, begin having regular mammograms starting at age 40. This is a scan to check for breast cancer.  Colon cancer screening: It is important to start screening for colon cancer at age 45.  Have a colonoscopy test every 10 years (or more often if you're at risk) Or, ask your provider about stool tests like a FIT test every year or Cologuard test every 3 years.  To learn more about your testing options, visit:   .  For help making a decision, visit:   https://bit.ly/yu14664.  Prostate cancer screening test: If you have a prostate, ask your care team if a prostate cancer screening test (PSA) at age 55 is right for you.  Lung cancer screening: If you are a current or former smoker ages 50 to 80, ask your care team if ongoing lung cancer screenings are right for you.  For informational purposes only. Not to replace the advice of your health care provider. Copyright   2023 [facility]  Services. All rights reserved. Clinically reviewed by the [facility]. [facility] [id number] - REV 01/24.

## 2024-11-20 NOTE — RESULT ENCOUNTER NOTE
Kidney test ( creatinine ) a bit high but overall stable.    The hemoglobin a1c is back to normal range ( was prediabetes range last year ).    White blood count and platelet count are low but stable.     Other labs are fine.    Mike Gomez MD        Please mail letter and results to patient   Thanks  Mike Gomez MD

## 2024-11-25 ENCOUNTER — PATIENT OUTREACH (OUTPATIENT)
Dept: CARE COORDINATION | Facility: CLINIC | Age: 64
End: 2024-11-25

## 2024-11-25 NOTE — PROGRESS NOTES
Clinic Care Coordination Contact  [facility]/Voicemail    Clinical Data: Care Coordinator Outreach    Outreach Documentation Number of Outreach Attempt   11/15/2024  10:43 AM 1   11/25/2024  11:40 AM 2       Unable to leave a message due to: Voicemail is not set up.      Plan: Care Coordinator sent care coordination introduction letter on 11/15/24 via mail. Care Coordinator will try to reach patient again in 3-5 business days.    Júnior Jean MSN, RN, PHN, CCM   Primary Care Clinical RN Care Coordinator  [facility]  11/25/2024   11:41 AM  [email or web address]  Office: [phone]

## 2024-11-25 NOTE — TELEPHONE ENCOUNTER
RECORDS RECEIVED FROM:    DATE RECEIVED:    NOTES STATUS DETAILS   OFFICE NOTE from referring provider  Internal Hospital f/u   OFFICE NOTE from other cardiologists  N/A    RECORDS from hospital/ED Internal 10-1-24   MEDICATION LIST Internal    GENERAL CARDIO RECORDS   (ALL APPOINTMENT TYPES)     LABS (CBC,BMP,CMP, TSH) Internal    EKG (STRIPS & REPORTS) Internal    MONITORS (STRIPS & REPORTS) Care Everywhere 4-5-24 - full report in CE   ECHOS (IMAGES AND REPORTS) Internal 10-2-24   STRESS TESTS (IMAGES AND REPORTS) N/A    cMRI (IMAGES AND REPORTS) N/A    Cardiac cath (IMAGES AND REPORTS) N/A    CT/CTA (IMAGES AND REPORTS) N/A    NEW EP     ICD/PACEMAKER IMPLANT No    CARDIOVERSION N/A    ABLATION OP NOTE (LAST 5 YEARS) N/A

## 2024-11-27 ENCOUNTER — PATIENT OUTREACH (OUTPATIENT)
Dept: CARE COORDINATION | Facility: CLINIC | Age: 64
End: 2024-11-27

## 2024-11-27 NOTE — PROGRESS NOTES
Clinic Care Coordination Contact  [facility]/Voicemail    Clinical Data: Care Coordinator Outreach    Outreach Documentation Number of Outreach Attempt   11/15/2024  10:43 AM 1   11/25/2024  11:40 AM 2   11/27/2024   1:34 PM 3       Unable to leave a message due to: Voicemail is not set up.      Plan: Care Coordinator. Care Coordinator will do no further outreaches at this time.    Júnior Jean MSN, RN, PHN, [facility]   Primary Care Clinical RN Care Coordinator  [facility]  11/27/2024   1:35 PM  [email or web address]  Office: [phone]

## 2024-12-05 ENCOUNTER — PRE VISIT (OUTPATIENT)
Dept: CARDIOLOGY | Facility: CLINIC | Age: 64
End: 2024-12-05

## 2024-12-05 ENCOUNTER — VIRTUAL VISIT (OUTPATIENT)
Dept: CARDIOLOGY | Facility: CLINIC | Age: 64
End: 2024-12-05
Attending: INTERNAL MEDICINE
Payer: MEDICARE

## 2024-12-05 VITALS — BODY MASS INDEX: 24.91 KG/M2 | WEIGHT: 155 LBS | HEIGHT: 66 IN

## 2024-12-05 DIAGNOSIS — R55 SYNCOPE AND COLLAPSE: Primary | ICD-10-CM

## 2024-12-05 ASSESSMENT — PAIN SCALES - GENERAL: PAINLEVEL_OUTOF10: SEVERE PAIN (7)

## 2024-12-05 NOTE — NURSING NOTE
Current patient location: [address] [place] [address] [id number]  [facility] [address]    Is the patient currently in the state of [place]? YES    Visit mode:VIDEO    If the visit is dropped, the patient can be reconnected by:VIDEO VISIT: Text to cell phone:   Telephone Information:   Mobile [phone]       Will anyone else be joining the visit? NO  (If patient encounters technical issues they should call [phone]    Are changes needed to the allergy or medication list? Pt stated no med changes    Are refills needed on medications prescribed by this physician? NO    Rooming Documentation:  Not applicable    Reason for visit: Consult    [employer] SEALS

## 2024-12-05 NOTE — LETTER
12/5/2024      RE: Geronimo Coats  [address]  [place] [address]       Dear Colleague,    Thank you for the opportunity to participate in the care of your patient, Geronimo Coats, at the [facility]. Please see a copy of my visit note below.    12/5/24  Unable to contact patient  Will reschedule  CHAU Monreal MD           Please do not hesitate to contact me if you have any questions/concerns.     Sincerely,     Reinier Monreal MD

## 2025-03-27 ENCOUNTER — OFFICE VISIT (OUTPATIENT)
Dept: FAMILY MEDICINE | Facility: CLINIC | Age: 65
End: 2025-03-27
Payer: COMMERCIAL

## 2025-03-27 ENCOUNTER — ANCILLARY PROCEDURE (OUTPATIENT)
Dept: GENERAL RADIOLOGY | Facility: CLINIC | Age: 65
End: 2025-03-27
Attending: INTERNAL MEDICINE
Payer: COMMERCIAL

## 2025-03-27 VITALS
HEART RATE: 90 BPM | TEMPERATURE: 98.2 F | OXYGEN SATURATION: 98 % | RESPIRATION RATE: 18 BRPM | DIASTOLIC BLOOD PRESSURE: 84 MMHG | WEIGHT: 157 LBS | BODY MASS INDEX: 25.23 KG/M2 | SYSTOLIC BLOOD PRESSURE: 138 MMHG | HEIGHT: 66 IN

## 2025-03-27 DIAGNOSIS — K40.90 LEFT INGUINAL HERNIA: ICD-10-CM

## 2025-03-27 DIAGNOSIS — N18.2 CHRONIC KIDNEY DISEASE, STAGE 2 (MILD): Primary | ICD-10-CM

## 2025-03-27 DIAGNOSIS — K59.01 SLOW TRANSIT CONSTIPATION: ICD-10-CM

## 2025-03-27 LAB
ALBUMIN SERPL BCG-MCNC: 4.1 G/DL (ref 3.5–5.2)
ALBUMIN UR-MCNC: ABNORMAL MG/DL
ALP SERPL-CCNC: 83 U/L (ref 40–150)
ALT SERPL W P-5'-P-CCNC: 27 U/L (ref 0–70)
ANION GAP SERPL CALCULATED.3IONS-SCNC: 10 MMOL/L (ref 7–15)
APPEARANCE UR: CLEAR
AST SERPL W P-5'-P-CCNC: 34 U/L (ref 0–45)
BASOPHILS # BLD AUTO: 0 10E3/UL (ref 0–0.2)
BASOPHILS NFR BLD AUTO: 1 %
BILIRUB SERPL-MCNC: 0.6 MG/DL
BILIRUB UR QL STRIP: NEGATIVE
BUN SERPL-MCNC: 13.1 MG/DL (ref 8–23)
CALCIUM SERPL-MCNC: 9.6 MG/DL (ref 8.8–10.4)
CHLORIDE SERPL-SCNC: 107 MMOL/L (ref 98–107)
COLOR UR AUTO: YELLOW
CREAT SERPL-MCNC: 1.22 MG/DL (ref 0.67–1.17)
CREAT UR-MCNC: 183 MG/DL
EGFRCR SERPLBLD CKD-EPI 2021: 65 ML/MIN/1.73M2
EOSINOPHIL # BLD AUTO: 0 10E3/UL (ref 0–0.7)
EOSINOPHIL NFR BLD AUTO: 1 %
ERYTHROCYTE [DISTWIDTH] IN BLOOD BY AUTOMATED COUNT: 13.3 % (ref 10–15)
GLUCOSE SERPL-MCNC: 91 MG/DL (ref 70–99)
GLUCOSE UR STRIP-MCNC: NEGATIVE MG/DL
HCO3 SERPL-SCNC: 26 MMOL/L (ref 22–29)
HCT VFR BLD AUTO: 45.6 % (ref 40–53)
HGB BLD-MCNC: 14.8 G/DL (ref 13.3–17.7)
HGB UR QL STRIP: ABNORMAL
IMM GRANULOCYTES # BLD: 0 10E3/UL
IMM GRANULOCYTES NFR BLD: 0 %
KETONES UR STRIP-MCNC: NEGATIVE MG/DL
LEUKOCYTE ESTERASE UR QL STRIP: ABNORMAL
LYMPHOCYTES # BLD AUTO: 1.1 10E3/UL (ref 0.8–5.3)
LYMPHOCYTES NFR BLD AUTO: 39 %
MCH RBC QN AUTO: 28.4 PG (ref 26.5–33)
MCHC RBC AUTO-ENTMCNC: 32.5 G/DL (ref 31.5–36.5)
MCV RBC AUTO: 88 FL (ref 78–100)
MICROALBUMIN UR-MCNC: 36.6 MG/L
MICROALBUMIN/CREAT UR: 20 MG/G CR (ref 0–17)
MONOCYTES # BLD AUTO: 0.4 10E3/UL (ref 0–1.3)
MONOCYTES NFR BLD AUTO: 14 %
NEUTROPHILS # BLD AUTO: 1.3 10E3/UL (ref 1.6–8.3)
NEUTROPHILS NFR BLD AUTO: 45 %
NITRATE UR QL: NEGATIVE
PH UR STRIP: 6.5 [PH] (ref 5–7)
PLATELET # BLD AUTO: 106 10E3/UL (ref 150–450)
POTASSIUM SERPL-SCNC: 3.2 MMOL/L (ref 3.4–5.3)
PROT SERPL-MCNC: 7.1 G/DL (ref 6.4–8.3)
RBC # BLD AUTO: 5.21 10E6/UL (ref 4.4–5.9)
RBC #/AREA URNS AUTO: ABNORMAL /HPF
SODIUM SERPL-SCNC: 143 MMOL/L (ref 135–145)
SP GR UR STRIP: 1.02 (ref 1–1.03)
SQUAMOUS #/AREA URNS AUTO: ABNORMAL /LPF
UROBILINOGEN UR STRIP-ACNC: 1 E.U./DL
WBC # BLD AUTO: 2.9 10E3/UL (ref 4–11)
WBC #/AREA URNS AUTO: ABNORMAL /HPF

## 2025-03-27 RX ORDER — POLYETHYLENE GLYCOL 3350 17 G/17G
1 POWDER, FOR SOLUTION ORAL DAILY
Qty: 850 G | Refills: 3 | Status: SHIPPED | OUTPATIENT
Start: 2025-03-27

## 2025-03-27 ASSESSMENT — PAIN SCALES - GENERAL: PAINLEVEL_OUTOF10: MODERATE PAIN (5)

## 2025-03-27 NOTE — PROGRESS NOTES
"  Assessment & Plan   Problem List Items Addressed This Visit       Chronic kidney disease, stage 2 (mild) - Primary    Relevant Orders    UA Macroscopic with reflex to Microscopic and Culture (Completed)    Albumin Random Urine Quantitative with Creat Ratio (Completed)    CBC with platelets and differential (Completed)    Comprehensive metabolic panel (BMP + Alb, Alk Phos, ALT, AST, Total. Bili, TP) (Completed)    UA Microscopic with Reflex to Culture (Completed)     Other Visit Diagnoses       Left inguinal hernia        Relevant Orders    CBC with platelets and differential (Completed)    Comprehensive metabolic panel (BMP + Alb, Alk Phos, ALT, AST, Total. Bili, TP) (Completed)    Adult Gen Surg  Referral    Slow transit constipation        Relevant Medications    polyethylene glycol (MIRALAX) 17 GM/Dose powder    Other Relevant Orders    REVIEW OF HEALTH MAINTENANCE PROTOCOL ORDERS (Completed)    XR Abdomen 2 Views (Completed)                  BMI  Estimated body mass index is 25.34 kg/m  as calculated from the following:    Height as of this encounter: 1.676 m (5' 6\").    Weight as of this encounter: 71.2 kg (157 lb).   Weight management plan: Discussed healthy diet and exercise guidelines    Regular exercise      Kari   LIS Galarza is a 64 year old, presenting for the following health issues:  Groin Pain (Lump in groin area)        3/27/2025     7:27 AM   Additional Questions   Roomed by Regi     History of Present Illness       Reason for visit:  Lump in groin area  Symptom onset:  More than a month  Symptoms include:  Lump that is very painful  Symptom intensity:  Severe  Symptom progression:  Worsening  Had these symptoms before:  No    He eats 2-3 servings of fruits and vegetables daily.He consumes 1 sweetened beverage(s) daily.He exercises with enough effort to increase his heart rate 9 or less minutes per day.  He exercises with enough effort to increase his heart rate 3 or less days per week. " "  He is taking medications regularly.   Left sided pain, constipation   Left groin   2002 -2003 constipation                 Review of Systems  Constitutional, HEENT, cardiovascular, pulmonary, gi and gu systems are negative, except as otherwise noted.      Objective    /84 (BP Location: Right arm, Patient Position: Sitting, Cuff Size: Adult Regular)   Pulse 90   Temp 98.2  F (36.8  C) (Temporal)   Resp 18   Ht 1.676 m (5' 6\")   Wt 71.2 kg (157 lb)   SpO2 98%   BMI 25.34 kg/m    Body mass index is 25.34 kg/m .  Physical Exam   GENERAL: alert and no distress  EYES: Eyes grossly normal to inspection, PERRL and conjunctivae and sclerae normal  HENT: ear canals and TM's normal, nose and mouth without ulcers or lesions  NECK: no adenopathy, no asymmetry, masses, or scars  RESP: lungs clear to auscultation - no rales, rhonchi or wheezes  CV: regular rate and rhythm, normal S1 S2, no S3 or S4, no murmur, click or rub, no peripheral edema  ABDOMEN: fullness left lower quadrant likely stool    (male): testicles normal without atrophy or masses, hernia large left inguinal hernia reducible, and penis normal without urethral discharge    Results for orders placed or performed in visit on 03/27/25   XR Abdomen 2 Views     Status: None    Narrative    EXAM: XR ABDOMEN 2 VIEWS  LOCATION: [facility]  DATE: 3/27/2025    INDICATION:  Slow transit constipation  COMPARISON: None.      Impression    IMPRESSION: Nonobstructive bowel gas pattern. Moderate stool volume throughout colon. No free air. 16 x 14 mm stone in the left renal pelvis. Several other clustered stones in the left lower pole kidney each measuring up to 4 mm. Cholecystectomy.   Results for orders placed or performed in visit on 03/27/25   UA Macroscopic with reflex to Microscopic and Culture     Status: Abnormal    Specimen: Urine, Midstream   Result Value Ref Range    Color Urine Yellow Colorless, Straw, Light Yellow, Yellow    " Appearance Urine Clear Clear    Glucose Urine Negative Negative mg/dL    Bilirubin Urine Negative Negative    Ketones Urine Negative Negative mg/dL    Specific Gravity Urine 1.020 1.003 - 1.035    Blood Urine Small (A) Negative    pH Urine 6.5 5.0 - 7.0    Protein Albumin Urine Trace (A) Negative mg/dL    Urobilinogen Urine 1.0 0.2, 1.0 E.U./dL    Nitrite Urine Negative Negative    Leukocyte Esterase Urine Small (A) Negative   Albumin Random Urine Quantitative with Creat Ratio     Status: Abnormal   Result Value Ref Range    Creatinine Urine mg/dL 183.0 mg/dL    Albumin Urine mg/L 36.6 mg/L    Albumin Urine mg/g Cr 20.00 (H) 0.00 - 17.00 mg/g Cr   Comprehensive metabolic panel (BMP + Alb, Alk Phos, ALT, AST, Total. Bili, TP)     Status: Abnormal   Result Value Ref Range    Sodium 143 135 - 145 mmol/L    Potassium 3.2 (L) 3.4 - 5.3 mmol/L    Carbon Dioxide (CO2) 26 22 - 29 mmol/L    Anion Gap 10 7 - 15 mmol/L    Urea Nitrogen 13.1 8.0 - 23.0 mg/dL    Creatinine 1.22 (H) 0.67 - 1.17 mg/dL    GFR Estimate 65 >60 mL/min/1.73m2    Calcium 9.6 8.8 - 10.4 mg/dL    Chloride 107 98 - 107 mmol/L    Glucose 91 70 - 99 mg/dL    Alkaline Phosphatase 83 40 - 150 U/L    AST 34 0 - 45 U/L    ALT 27 0 - 70 U/L    Protein Total 7.1 6.4 - 8.3 g/dL    Albumin 4.1 3.5 - 5.2 g/dL    Bilirubin Total 0.6 <=1.2 mg/dL   CBC with platelets and differential     Status: Abnormal   Result Value Ref Range    WBC Count 2.9 (L) 4.0 - 11.0 10e3/uL    RBC Count 5.21 4.40 - 5.90 10e6/uL    Hemoglobin 14.8 13.3 - 17.7 g/dL    Hematocrit 45.6 40.0 - 53.0 %    MCV 88 78 - 100 fL    MCH 28.4 26.5 - 33.0 pg    MCHC 32.5 31.5 - 36.5 g/dL    RDW 13.3 10.0 - 15.0 %    Platelet Count 106 (L) 150 - 450 10e3/uL    % Neutrophils 45 %    % Lymphocytes 39 %    % Monocytes 14 %    % Eosinophils 1 %    % Basophils 1 %    % Immature Granulocytes 0 %    Absolute Neutrophils 1.3 (L) 1.6 - 8.3 10e3/uL    Absolute Lymphocytes 1.1 0.8 - 5.3 10e3/uL    Absolute Monocytes  0.4 0.0 - 1.3 10e3/uL    Absolute Eosinophils 0.0 0.0 - 0.7 10e3/uL    Absolute Basophils 0.0 0.0 - 0.2 10e3/uL    Absolute Immature Granulocytes 0.0 <=0.4 10e3/uL   UA Microscopic with Reflex to Culture     Status: Abnormal   Result Value Ref Range    RBC Urine 2-5 (A) 0-2 /HPF /HPF    WBC Urine 5-10 (A) 0-5 /HPF /HPF    Squamous Epithelials Urine Few (A) None Seen /LPF    Narrative    Urine Culture not indicated   CBC with platelets and differential     Status: Abnormal    Narrative    The following orders were created for panel order CBC with platelets and differential.  Procedure                               Abnormality         Status                     ---------                               -----------         ------                     CBC with platelets and ...[[id number]]  Abnormal            Final result                 Please view results for these tests on the individual orders.           Signed Electronically by: Michele Saldana MD

## 2025-04-28 ENCOUNTER — TELEPHONE (OUTPATIENT)
Dept: SURGERY | Facility: CLINIC | Age: 65
End: 2025-04-28

## 2025-04-28 ENCOUNTER — OFFICE VISIT (OUTPATIENT)
Dept: SURGERY | Facility: CLINIC | Age: 65
End: 2025-04-28
Attending: INTERNAL MEDICINE
Payer: COMMERCIAL

## 2025-04-28 VITALS
WEIGHT: 155.6 LBS | DIASTOLIC BLOOD PRESSURE: 74 MMHG | HEIGHT: 65 IN | SYSTOLIC BLOOD PRESSURE: 118 MMHG | BODY MASS INDEX: 25.92 KG/M2

## 2025-04-28 DIAGNOSIS — K40.90 LEFT INGUINAL HERNIA: Primary | ICD-10-CM

## 2025-04-28 PROCEDURE — 3074F SYST BP LT 130 MM HG: CPT | Performed by: SURGERY

## 2025-04-28 PROCEDURE — 3078F DIAST BP <80 MM HG: CPT | Performed by: SURGERY

## 2025-04-28 PROCEDURE — 99213 OFFICE O/P EST LOW 20 MIN: CPT | Performed by: SURGERY

## 2025-04-28 NOTE — LETTER
4/28/2025      Geronimo Coats  [address]   [phone]  [place] [address]      Dear Colleague,    Thank you for referring your patient, Geronimo Coats, to the [facility]. Please see a copy of my visit note below.    Patient seen in consultation for left inguinal hernia by Michele Saldana    HPI:  Patient is a 64 year old male  with complaints of left inguinal hernia  The patient noticed the symptoms about 1 year ago.    Enlarging and now getting painful  Had right inguinal repair in 1974, right side still feels OK  nothing makes the episode better.  Patient has family history of hernia problems in uncle    Review Of Systems    Skin: negative  Ears/Nose/Throat: negative  Respiratory: No shortness of breath, dyspnea on exertion, cough, or hemoptysis  Cardiovascular: negative  Gastrointestinal: negative  Genitourinary: negative  Musculoskeletal: negative  Neurologic: history of stroke  Hematologic/Lymphatic/Immunologic: negative  Endocrine: negative      Past Medical History:   Diagnosis Date     Benign essential hypertension      Hyperlipidemia      Right pontine stroke (H) 10/01/2022       Past Surgical History:   Procedure Laterality Date     HERNIA REPAIR  1974     LAPAROSCOPIC CHOLECYSTECTOMY  2014       Social History     Socioeconomic History     Marital status:      Spouse name: Not on file     Number of children: 6     Years of education: Not on file     Highest education level: Not on file   Occupational History     Occupation: odd jobs   Tobacco Use     Smoking status: Never     Passive exposure: Never     Smokeless tobacco: Never   Vaping Use     Vaping status: Never Used   Substance and Sexual Activity     Alcohol use: Yes     Comment: rarely 2-3 times per year     Drug use: Never     Sexual activity: Not Currently     Partners: Female   Other Topics Concern     Not on file   Social History Narrative     Not on file     Social Drivers of Health     Financial  Resource Strain: Low Risk  (11/19/2024)    Financial Resource Strain      Within the past 12 months, have you or your family members you live with been unable to get utilities (heat, electricity) when it was really needed?: No   Food Insecurity: Low Risk  (11/19/2024)    Food Insecurity      Within the past 12 months, did you worry that your food would run out before you got money to buy more?: No      Within the past 12 months, did the food you bought just not last and you didn t have money to get more?: No   Transportation Needs: Low Risk  (11/19/2024)    Transportation Needs      Within the past 12 months, has lack of transportation kept you from medical appointments, getting your medicines, non-medical meetings or appointments, work, or from getting things that you need?: No   Physical Activity: Inactive (11/19/2024)    Exercise Vital Sign      Days of Exercise per Week: 0 days      Minutes of Exercise per Session: 0 min   Stress: No Stress Concern Present (11/19/2024)    [place] [employer] - Occupational Stress Questionnaire      Feeling of Stress : Not at all   Social Connections: Unknown (11/19/2024)    Social Connection and Isolation Panel [NHANES]      Frequency of Communication with Friends and Family: Not on file      Frequency of Social Gatherings with Friends and Family: Once a week      Attends Evangelical Services: Not on file      Active Member of Clubs or Organizations: Not on file      Attends Club or Organization Meetings: Not on file      Marital Status: Not on file   Interpersonal Safety: Low Risk  (11/19/2024)    Interpersonal Safety      Do you feel physically and emotionally safe where you currently live?: Yes      Within the past 12 months, have you been hit, slapped, kicked or otherwise physically hurt by someone?: No      Within the past 12 months, have you been humiliated or emotionally abused in other ways by your partner or ex-partner?: No   Housing Stability: Low  "Risk  (11/19/2024)    Housing Stability      Do you have housing? : Yes      Are you worried about losing your housing?: No   Recent Concern: Housing Stability - High Risk (10/2/2024)    Housing Stability      Do you have housing? : No      Are you worried about losing your housing?: No       Current Outpatient Medications   Medication Sig Dispense Refill     amLODIPine (NORVASC) 10 MG tablet Take 1 tablet (10 mg) by mouth daily 90 tablet 3     atorvastatin (LIPITOR) 20 MG tablet Take 1 tablet (20 mg) by mouth every evening 90 tablet 3     cloNIDine (CATAPRES) 0.1 MG tablet Take 1 tablet (0.1 mg) by mouth 2 times daily 180 tablet 3     famotidine (PEPCID) 20 MG tablet Take 1 tablet (20 mg) by mouth 2 times daily as needed (heartburn). 60 tablet 11     losartan (COZAAR) 100 MG tablet Take 1 tablet (100 mg) by mouth daily 90 tablet 3     polyethylene glycol (MIRALAX) 17 GM/Dose powder Take 17 g (1 Capful) by mouth daily. 850 g 3     tenofovir (VIREAD) 300 MG tablet Take 1 tablet (300 mg) by mouth daily. (Patient not taking: Reported on 4/28/2025) 90 tablet 3       Medications and history reviewed    Physical exam:  Vitals: /74   Ht 1.65 m (5' 4.96\")   Wt 70.6 kg (155 lb 9.6 oz)   BMI 25.92 kg/m    BMI= Body mass index is 25.92 kg/m .    Constitutional: healthy, alert, and no distress  Head: Normocephalic. No masses, lesions, tenderness or abnormalities  Gastrointestinal: Abdomen soft, non-tender. BS normal. No masses, organomegaly  : male positive for left inguinal hernia, moderate to large, easily reducible. Suspect direct type. Right side with old scar, no recurrence  Musculoskeletal: extremities normal- no gross deformities noted, gait normal, and normal muscle tone  Skin: no suspicious lesions or rashes  Psychiatric: mentation appears normal and affect normal/bright      Assessment:     ICD-10-CM    1. Left inguinal hernia  K40.90 Adult Gen Surg  Referral        Plan: Discussed open and " minimally invasive approaches to hernia repair.  I do not suspect any recurrence of the right inguinal hernia though mentioned possibility of looking over there with a laparoscopic or robotic repair and being able to fix if recurrence found.  He would like to go ahead with a robotic repair.  Risks of surgery discussed including, but not limited to bleeding, infection, recurrence, damage to intra-abdominal contents such as nerves, blood vessels, bowel or other organs.  Risks of anesthesia also discussed.  Although mesh is a better long term repair if it gets infected it must be removed. Mesh problems such as fracture, erosion or adhesions are possible.  If there is evidence of an infection at time of surgery it will be cancelled and rescheduled to when well.    Discussed massaging hernia back in and using ice if becomes more painful.  If not able to reduce then go to emergency room.  Will work on scheduling for him    Aleksander Casper MD      Again, thank you for allowing me to participate in the care of your patient.        Sincerely,        Aleksander Casper MD    Electronically signed

## 2025-04-28 NOTE — TELEPHONE ENCOUNTER
.Type of surgery: HERNIORRHAPHY, INGUINAL, ROBOT-ASSISTED, LAPAROSCOPIC, USING DA JENNIFER XI left possible bilateral (Left)   Location of surgery: [facility]  Date and time of surgery: 7/17  Surgeon: Manoj   Pre-Op Appt Date: 7/7  Post-Op Appt Date: 7/28   Packet sent out: Yes  Pre-cert/Authorization completed:  Not Applicable  Date:

## 2025-04-28 NOTE — PROGRESS NOTES
Patient seen in consultation for left inguinal hernia by Michele Saldana    HPI:  Patient is a 64 year old male  with complaints of left inguinal hernia  The patient noticed the symptoms about 1 year ago.    Enlarging and now getting painful  Had right inguinal repair in 1974, right side still feels OK  nothing makes the episode better.  Patient has family history of hernia problems in uncle    Review Of Systems    Skin: negative  Ears/Nose/Throat: negative  Respiratory: No shortness of breath, dyspnea on exertion, cough, or hemoptysis  Cardiovascular: negative  Gastrointestinal: negative  Genitourinary: negative  Musculoskeletal: negative  Neurologic: history of stroke  Hematologic/Lymphatic/Immunologic: negative  Endocrine: negative      Past Medical History:   Diagnosis Date    Benign essential hypertension     Hyperlipidemia     Right pontine stroke (H) 10/01/2022       Past Surgical History:   Procedure Laterality Date    HERNIA REPAIR  1974    LAPAROSCOPIC CHOLECYSTECTOMY  2014       Social History     Socioeconomic History    Marital status:      Spouse name: Not on file    Number of children: 6    Years of education: Not on file    Highest education level: Not on file   Occupational History    Occupation: odd jobs   Tobacco Use    Smoking status: Never     Passive exposure: Never    Smokeless tobacco: Never   Vaping Use    Vaping status: Never Used   Substance and Sexual Activity    Alcohol use: Yes     Comment: rarely 2-3 times per year    Drug use: Never    Sexual activity: Not Currently     Partners: Female   Other Topics Concern    Not on file   Social History Narrative    Not on file     Social Drivers of Health     Financial Resource Strain: Low Risk  (11/19/2024)    Financial Resource Strain     Within the past 12 months, have you or your family members you live with been unable to get utilities (heat, electricity) when it was really needed?: No   Food Insecurity: Low Risk  (11/19/2024)     Food Insecurity     Within the past 12 months, did you worry that your food would run out before you got money to buy more?: No     Within the past 12 months, did the food you bought just not last and you didn t have money to get more?: No   Transportation Needs: Low Risk  (11/19/2024)    Transportation Needs     Within the past 12 months, has lack of transportation kept you from medical appointments, getting your medicines, non-medical meetings or appointments, work, or from getting things that you need?: No   Physical Activity: Inactive (11/19/2024)    Exercise Vital Sign     Days of Exercise per Week: 0 days     Minutes of Exercise per Session: 0 min   Stress: No Stress Concern Present (11/19/2024)    [employer] [place] [employer] - Occupational Stress Questionnaire     Feeling of Stress : Not at all   Social Connections: Unknown (11/19/2024)    Social Connection and Isolation Panel [NHANES]     Frequency of Communication with Friends and Family: Not on file     Frequency of Social Gatherings with Friends and Family: Once a week     Attends Christian Services: Not on file     Active Member of Clubs or Organizations: Not on file     Attends Club or Organization Meetings: Not on file     Marital Status: Not on file   Interpersonal Safety: Low Risk  (11/19/2024)    Interpersonal Safety     Do you feel physically and emotionally safe where you currently live?: Yes     Within the past 12 months, have you been hit, slapped, kicked or otherwise physically hurt by someone?: No     Within the past 12 months, have you been humiliated or emotionally abused in other ways by your partner or ex-partner?: No   Housing Stability: Low Risk  (11/19/2024)    Housing Stability     Do you have housing? : Yes     Are you worried about losing your housing?: No   Recent Concern: Housing Stability - High Risk (10/2/2024)    Housing Stability     Do you have housing? : No     Are you worried about losing your housing?: No  "      Current Outpatient Medications   Medication Sig Dispense Refill    amLODIPine (NORVASC) 10 MG tablet Take 1 tablet (10 mg) by mouth daily 90 tablet 3    atorvastatin (LIPITOR) 20 MG tablet Take 1 tablet (20 mg) by mouth every evening 90 tablet 3    cloNIDine (CATAPRES) 0.1 MG tablet Take 1 tablet (0.1 mg) by mouth 2 times daily 180 tablet 3    famotidine (PEPCID) 20 MG tablet Take 1 tablet (20 mg) by mouth 2 times daily as needed (heartburn). 60 tablet 11    losartan (COZAAR) 100 MG tablet Take 1 tablet (100 mg) by mouth daily 90 tablet 3    polyethylene glycol (MIRALAX) 17 GM/Dose powder Take 17 g (1 Capful) by mouth daily. 850 g 3    tenofovir (VIREAD) 300 MG tablet Take 1 tablet (300 mg) by mouth daily. (Patient not taking: Reported on 4/28/2025) 90 tablet 3       Medications and history reviewed    Physical exam:  Vitals: /74   Ht 1.65 m (5' 4.96\")   Wt 70.6 kg (155 lb 9.6 oz)   BMI 25.92 kg/m    BMI= Body mass index is 25.92 kg/m .    Constitutional: healthy, alert, and no distress  Head: Normocephalic. No masses, lesions, tenderness or abnormalities  Gastrointestinal: Abdomen soft, non-tender. BS normal. No masses, organomegaly  : male positive for left inguinal hernia, moderate to large, easily reducible. Suspect direct type. Right side with old scar, no recurrence  Musculoskeletal: extremities normal- no gross deformities noted, gait normal, and normal muscle tone  Skin: no suspicious lesions or rashes  Psychiatric: mentation appears normal and affect normal/bright      Assessment:     ICD-10-CM    1. Left inguinal hernia  K40.90 Adult Gen Surg  Referral        Plan: Discussed open and minimally invasive approaches to hernia repair.  I do not suspect any recurrence of the right inguinal hernia though mentioned possibility of looking over there with a laparoscopic or robotic repair and being able to fix if recurrence found.  He would like to go ahead with a robotic repair.  Risks of " surgery discussed including, but not limited to bleeding, infection, recurrence, damage to intra-abdominal contents such as nerves, blood vessels, bowel or other organs.  Risks of anesthesia also discussed.  Although mesh is a better long term repair if it gets infected it must be removed. Mesh problems such as fracture, erosion or adhesions are possible.  If there is evidence of an infection at time of surgery it will be cancelled and rescheduled to when well.    Discussed massaging hernia back in and using ice if becomes more painful.  If not able to reduce then go to emergency room.  Will work on scheduling for him    Aleksander Casper MD

## 2025-05-14 ENCOUNTER — PATIENT OUTREACH (OUTPATIENT)
Dept: CARE COORDINATION | Facility: CLINIC | Age: 65
End: 2025-05-14
Payer: COMMERCIAL

## 2025-05-28 ENCOUNTER — TELEPHONE (OUTPATIENT)
Dept: FAMILY MEDICINE | Facility: CLINIC | Age: 65
End: 2025-05-28
Payer: COMMERCIAL

## 2025-05-28 DIAGNOSIS — I63.9 CEREBROVASCULAR ACCIDENT (CVA), UNSPECIFIED MECHANISM (H): ICD-10-CM

## 2025-05-28 DIAGNOSIS — I15.2 HYPERTENSION DUE TO ENDOCRINE DISORDER: ICD-10-CM

## 2025-05-28 RX ORDER — LOSARTAN POTASSIUM 100 MG/1
100 TABLET ORAL DAILY
Qty: 100 TABLET | Refills: 1 | Status: SHIPPED | OUTPATIENT
Start: 2025-05-28

## 2025-05-28 RX ORDER — ATORVASTATIN CALCIUM 20 MG/1
20 TABLET, FILM COATED ORAL EVERY EVENING
Qty: 100 TABLET | Refills: 1 | Status: SHIPPED | OUTPATIENT
Start: 2025-05-28

## 2025-05-28 NOTE — TELEPHONE ENCOUNTER
Patient is overdue to fill atorvastatin and losartan. Per our records, last filled 2/4/25 and is 13 days late to fill. Current prescription out of refills.     Patient has [facility] [employer] coverage and with this insurance plan, the patient is eligible to receive certain prescriptions as a 100-day supply at the 90-day supply cost.      Prescriptions updated to 100-day supply per protocol: atorvastatin and losartan      Jen Dempsey PharmD, [facility]  Population Health Pharmacist  [phone]